# Patient Record
Sex: MALE | Race: WHITE
[De-identification: names, ages, dates, MRNs, and addresses within clinical notes are randomized per-mention and may not be internally consistent; named-entity substitution may affect disease eponyms.]

---

## 2017-01-04 ENCOUNTER — RECORD ABSTRACTING (OUTPATIENT)
Age: 82
End: 2017-01-04

## 2017-01-04 DIAGNOSIS — Z95.810 PRESENCE OF AUTOMATIC (IMPLANTABLE) CARDIAC DEFIBRILLATOR: ICD-10-CM

## 2017-01-04 PROBLEM — Z00.00 ENCOUNTER FOR PREVENTIVE HEALTH EXAMINATION: Status: ACTIVE | Noted: 2017-01-04

## 2017-09-13 ENCOUNTER — APPOINTMENT (OUTPATIENT)
Dept: ENDOCRINOLOGY | Facility: CLINIC | Age: 82
End: 2017-09-13

## 2017-09-13 ENCOUNTER — OUTPATIENT (OUTPATIENT)
Dept: OUTPATIENT SERVICES | Facility: HOSPITAL | Age: 82
LOS: 1 days | Discharge: HOME | End: 2017-09-13

## 2017-09-13 VITALS
HEIGHT: 71 IN | SYSTOLIC BLOOD PRESSURE: 129 MMHG | WEIGHT: 175 LBS | HEART RATE: 64 BPM | DIASTOLIC BLOOD PRESSURE: 81 MMHG | BODY MASS INDEX: 24.5 KG/M2 | TEMPERATURE: 97.1 F

## 2017-09-13 DIAGNOSIS — Z78.9 OTHER SPECIFIED HEALTH STATUS: ICD-10-CM

## 2018-06-18 ENCOUNTER — APPOINTMENT (OUTPATIENT)
Dept: UROLOGY | Facility: CLINIC | Age: 83
End: 2018-06-18
Payer: COMMERCIAL

## 2018-06-18 VITALS
HEIGHT: 71 IN | BODY MASS INDEX: 25.2 KG/M2 | WEIGHT: 180 LBS | HEART RATE: 60 BPM | DIASTOLIC BLOOD PRESSURE: 74 MMHG | SYSTOLIC BLOOD PRESSURE: 120 MMHG

## 2018-06-18 DIAGNOSIS — Z85.47 PERSONAL HISTORY OF MALIGNANT NEOPLASM OF TESTIS: ICD-10-CM

## 2018-06-18 PROCEDURE — 99204 OFFICE O/P NEW MOD 45 MIN: CPT

## 2018-06-18 NOTE — LETTER BODY
[Dear  ___] : Dear  [unfilled], [Consult Letter:] : I had the pleasure of evaluating your patient, [unfilled]. [Please see my note below.] : Please see my note below. [Consult Closing:] : Thank you very much for allowing me to participate in the care of this patient.  If you have any questions, please do not hesitate to contact me. [Sincerely,] : Sincerely, [FreeTextEntry3] : Matthew Garces MD\par Director of Male Sexual Dysfunction and Urologic Prosthetics

## 2018-06-18 NOTE — ASSESSMENT
[FreeTextEntry1] : This is a 82 year male who presents for evaluation\par left testicular cancer in 1988 with lymph node dissection\par Year ago had CT which showed no cancer\par \par Also had retractile R testicle which was brought down by Dr Acevedo but then it went back up.  no pain in the right inguinal region\par \par Has been taking testosterone androgel  2 pumps a day for one year.  without it feels weak, faint and sweating.  all of these symptoms resolve with testosterone\par \par July 2017:  sonogram\par IMPRESSION:\par Right testicle is in the right inguinal canal. No testicular mass.\par  \par April 2017 CT scan\par Impression:\par Multiple surgical clips in the retroperitoneal, probably status post lymph node dissection. No evidence of lymphadenopathy.\par No evidence of metastatic disease.\par Suspect mild circumferential thickening of the bladder wall. Rule out cystitis. Small stone in the bladder.\par Enlarged prostate.\par \par also has ED == takes viagra but rarely and still has about 6 pills left so does not want a refill yet. \par

## 2018-06-18 NOTE — PHYSICAL EXAM
[General Appearance - Well Developed] : well developed [General Appearance - Well Nourished] : well nourished [Normal Appearance] : normal appearance [Well Groomed] : well groomed [General Appearance - In No Acute Distress] : no acute distress [Edema] : no peripheral edema [Respiration, Rhythm And Depth] : normal respiratory rhythm and effort [Exaggerated Use Of Accessory Muscles For Inspiration] : no accessory muscle use [Abdomen Soft] : soft [Abdomen Tenderness] : non-tender [Costovertebral Angle Tenderness] : no ~M costovertebral angle tenderness [Urethral Meatus] : meatus normal [No Prostate Nodules] : no prostate nodules [Normal Station and Gait] : the gait and station were normal for the patient's age [] : no rash [No Focal Deficits] : no focal deficits [Oriented To Time, Place, And Person] : oriented to person, place, and time [Affect] : the affect was normal [Mood] : the mood was normal [Not Anxious] : not anxious [FreeTextEntry1] : no testes in scrotum

## 2018-09-17 ENCOUNTER — APPOINTMENT (OUTPATIENT)
Dept: UROLOGY | Facility: CLINIC | Age: 83
End: 2018-09-17
Payer: COMMERCIAL

## 2018-09-17 ENCOUNTER — APPOINTMENT (OUTPATIENT)
Dept: UROLOGY | Facility: CLINIC | Age: 83
End: 2018-09-17

## 2018-09-17 VITALS
SYSTOLIC BLOOD PRESSURE: 148 MMHG | WEIGHT: 180 LBS | HEIGHT: 71 IN | BODY MASS INDEX: 25.2 KG/M2 | HEART RATE: 68 BPM | DIASTOLIC BLOOD PRESSURE: 76 MMHG

## 2018-09-17 PROCEDURE — 99214 OFFICE O/P EST MOD 30 MIN: CPT

## 2018-09-17 NOTE — ASSESSMENT
[FreeTextEntry1] : This is a 82 year male who presents for evaluation\par left testicular cancer in 1988 with lymph node dissection\par Year ago had CT which showed no cancer\par \par Also had retractile R testicle which was brought down by Dr Acevedo but then it went back up. no pain in the right inguinal region\par \par Has been taking testosterone androgel 2 pumps a day for one year. without it feels weak, faint and sweating. all of these symptoms resolve with testosterone\par \par July 2017: sonogram\par IMPRESSION:\par Right testicle is in the right inguinal canal. No testicular mass.\par  \par April 2017 CT scan\par Impression:\par Multiple surgical clips in the retroperitoneal, probably status post lymph node dissection. No evidence of lymphadenopathy.\par No evidence of metastatic disease.\par Suspect mild circumferential thickening of the bladder wall. Rule out cystitis. Small stone in the bladder.\par Enlarged prostate.\par \par also has ED == takes viagra but rarely and still has about 6 pills left so does not want a refill yet. \par  \par US Doppler Scrotum: no mass in right inguinal testis\par \par Labs from 8/14/18\par normal H/H\par Estradio 68 (<39)\par PSA 8.7\par % free = 30\par total T = 683\par Bioavail 203

## 2019-01-14 ENCOUNTER — APPOINTMENT (OUTPATIENT)
Dept: UROLOGY | Facility: CLINIC | Age: 84
End: 2019-01-14
Payer: COMMERCIAL

## 2019-01-14 VITALS
DIASTOLIC BLOOD PRESSURE: 78 MMHG | WEIGHT: 170 LBS | BODY MASS INDEX: 24.34 KG/M2 | SYSTOLIC BLOOD PRESSURE: 133 MMHG | HEART RATE: 75 BPM | HEIGHT: 70 IN

## 2019-01-14 PROCEDURE — 99214 OFFICE O/P EST MOD 30 MIN: CPT

## 2019-01-14 NOTE — ASSESSMENT
[FreeTextEntry1] : JAMEE STATON is a 83 year old male with a past medical history of left testicular cancer in 1988 with lymph node dissection and a year ago had CT which showed no cancer.Also had retractile R testicle which was brought down by Dr Acevedo but then it went back up. no pain in the right inguinal region. Presents to the office today for a follow up, last seen on 9/17/2018 for low testosterone. PAtient currently on testosterone Androgel 2 pumps a day for over a year. Without it feels weak, faint and sweating. all of these symptoms resolve with testosterone. Patient states feeling good, no complaints. Patient started SIldenafil for ED and satisfied with results. Patient states that he used a total of 6 pills since last visit.\par \par 12/13/2018\par -Normal Hepatic Function panel\par -Total testosterone 281(250-1100), Testostoerone Free 40.3 (6.0-73),  Bioavailable 82.9 (),SHBG 28 (22-77),  Estradiol 52 H (<39), ALbumin 4.5 (3.6-5.1)\par -PSA 6.7 ng/ml, 25 % Free PSA (PSA 8.7 on 8/14/2018)\par \par July 2017: sonogram\par IMPRESSION:\par Right testicle is in the right inguinal canal. No testicular mass.\par

## 2019-01-14 NOTE — LETTER BODY
[Dear  ___] : Dear  [unfilled], [Consult Letter:] : I had the pleasure of evaluating your patient, [unfilled]. [Please see my note below.] : Please see my note below. [Consult Closing:] : Thank you very much for allowing me to participate in the care of this patient.  If you have any questions, please do not hesitate to contact me. [Sincerely,] : Sincerely, [FreeTextEntry2] : Dr. Yogi Cam [FreeTextEntry3] : Matthew Garces MD\par Director of Male Sexual Dysfunction and Urologic Prosthetics

## 2019-01-14 NOTE — HISTORY OF PRESENT ILLNESS
[Currently Experiencing ___] :  [unfilled] [Erectile Dysfunction] : Erectile Dysfunction [None] : None [FreeTextEntry1] : JAMEE STATON is a 83 year old male with a past medical history of left testicular cancer in 1988 with lymph node dissection and a year ago had CT which showed no cancer.Also had retractile R testicle which was brought down by Dr Acevedo but then it went back up. no pain in the right inguinal region. Presents to the office today for a follow up, last seen on 9/17/2018 for low testosterone. PAtient currently on testosterone Androgel 2 pumps a day for over a year. Without it feels weak, faint and sweating. all of these symptoms resolve with testosterone. Patient states feeling good, no complaints. Patient started SIldenafil for ED and satisfied with results. Patient states that he used a total of 6 pills since last visit.\par \par 12/13/2018\par -Normal Hepatic Function panel\par -Total testosterone 281(250-1100), Testostoerone Free 40.3 (6.0-73),  Bioavailable 82.9 (),SHBG 28 (22-77),  Estradiol 52 H (<39), ALbumin 4.5 (3.6-5.1)\par -PSA 6.7 ng/ml, 25 % Free PSA (PSA 8.7 on 8/14/2018)\par \par July 2017: sonogram\par IMPRESSION:\par Right testicle is in the right inguinal canal. No testicular mass.\par  \par \par \par

## 2019-02-25 RX ORDER — TESTOSTERONE 20.25 MG/1.25G
20.25 MG/1.25GM GEL TOPICAL
Qty: 2 | Refills: 0 | Status: DISCONTINUED | COMMUNITY
Start: 2018-06-18 | End: 2019-02-25

## 2019-04-29 ENCOUNTER — INPATIENT (INPATIENT)
Facility: HOSPITAL | Age: 84
LOS: 2 days | Discharge: HOME | End: 2019-05-02
Attending: HOSPITALIST | Admitting: HOSPITALIST
Payer: MEDICARE

## 2019-04-29 VITALS
OXYGEN SATURATION: 99 % | DIASTOLIC BLOOD PRESSURE: 77 MMHG | SYSTOLIC BLOOD PRESSURE: 164 MMHG | TEMPERATURE: 97 F | HEART RATE: 70 BPM | RESPIRATION RATE: 20 BRPM

## 2019-04-29 DIAGNOSIS — Z98.890 OTHER SPECIFIED POSTPROCEDURAL STATES: Chronic | ICD-10-CM

## 2019-04-29 LAB
ALBUMIN SERPL ELPH-MCNC: 4.6 G/DL — SIGNIFICANT CHANGE UP (ref 3.5–5.2)
ALP SERPL-CCNC: 75 U/L — SIGNIFICANT CHANGE UP (ref 30–115)
ALT FLD-CCNC: 18 U/L — SIGNIFICANT CHANGE UP (ref 0–41)
ANION GAP SERPL CALC-SCNC: 11 MMOL/L — SIGNIFICANT CHANGE UP (ref 7–14)
APPEARANCE UR: CLEAR — SIGNIFICANT CHANGE UP
APTT BLD: 33.4 SEC — SIGNIFICANT CHANGE UP (ref 27–39.2)
AST SERPL-CCNC: 24 U/L — SIGNIFICANT CHANGE UP (ref 0–41)
BACTERIA # UR AUTO: ABNORMAL /HPF
BASOPHILS # BLD AUTO: 0.04 K/UL — SIGNIFICANT CHANGE UP (ref 0–0.2)
BASOPHILS NFR BLD AUTO: 0.5 % — SIGNIFICANT CHANGE UP (ref 0–1)
BILIRUB SERPL-MCNC: 0.6 MG/DL — SIGNIFICANT CHANGE UP (ref 0.2–1.2)
BILIRUB UR-MCNC: NEGATIVE — SIGNIFICANT CHANGE UP
BLD GP AB SCN SERPL QL: SIGNIFICANT CHANGE UP
BUN SERPL-MCNC: 28 MG/DL — HIGH (ref 10–20)
CALCIUM SERPL-MCNC: 9.6 MG/DL — SIGNIFICANT CHANGE UP (ref 8.5–10.1)
CHLORIDE SERPL-SCNC: 105 MMOL/L — SIGNIFICANT CHANGE UP (ref 98–110)
CO2 SERPL-SCNC: 27 MMOL/L — SIGNIFICANT CHANGE UP (ref 17–32)
COLOR SPEC: YELLOW — SIGNIFICANT CHANGE UP
CREAT SERPL-MCNC: 1.8 MG/DL — HIGH (ref 0.7–1.5)
DIFF PNL FLD: NEGATIVE — SIGNIFICANT CHANGE UP
EOSINOPHIL # BLD AUTO: 0.14 K/UL — SIGNIFICANT CHANGE UP (ref 0–0.7)
EOSINOPHIL NFR BLD AUTO: 1.7 % — SIGNIFICANT CHANGE UP (ref 0–8)
EPI CELLS # UR: ABNORMAL /HPF
GLUCOSE SERPL-MCNC: 93 MG/DL — SIGNIFICANT CHANGE UP (ref 70–99)
GLUCOSE UR QL: NEGATIVE MG/DL — SIGNIFICANT CHANGE UP
HCT VFR BLD CALC: 47.4 % — SIGNIFICANT CHANGE UP (ref 42–52)
HGB BLD-MCNC: 15.2 G/DL — SIGNIFICANT CHANGE UP (ref 14–18)
IMM GRANULOCYTES NFR BLD AUTO: 0.2 % — SIGNIFICANT CHANGE UP (ref 0.1–0.3)
INR BLD: 1.01 RATIO — SIGNIFICANT CHANGE UP (ref 0.65–1.3)
KETONES UR-MCNC: NEGATIVE — SIGNIFICANT CHANGE UP
LEUKOCYTE ESTERASE UR-ACNC: NEGATIVE — SIGNIFICANT CHANGE UP
LYMPHOCYTES # BLD AUTO: 1.29 K/UL — SIGNIFICANT CHANGE UP (ref 1.2–3.4)
LYMPHOCYTES # BLD AUTO: 15.8 % — LOW (ref 20.5–51.1)
MCHC RBC-ENTMCNC: 31.9 PG — HIGH (ref 27–31)
MCHC RBC-ENTMCNC: 32.1 G/DL — SIGNIFICANT CHANGE UP (ref 32–37)
MCV RBC AUTO: 99.6 FL — HIGH (ref 80–94)
MONOCYTES # BLD AUTO: 0.68 K/UL — HIGH (ref 0.1–0.6)
MONOCYTES NFR BLD AUTO: 8.3 % — SIGNIFICANT CHANGE UP (ref 1.7–9.3)
NEUTROPHILS # BLD AUTO: 6.01 K/UL — SIGNIFICANT CHANGE UP (ref 1.4–6.5)
NEUTROPHILS NFR BLD AUTO: 73.5 % — SIGNIFICANT CHANGE UP (ref 42.2–75.2)
NITRITE UR-MCNC: NEGATIVE — SIGNIFICANT CHANGE UP
NRBC # BLD: 0 /100 WBCS — SIGNIFICANT CHANGE UP (ref 0–0)
PH UR: 6.5 — SIGNIFICANT CHANGE UP (ref 5–8)
PLATELET # BLD AUTO: 140 K/UL — SIGNIFICANT CHANGE UP (ref 130–400)
POTASSIUM SERPL-MCNC: 4.9 MMOL/L — SIGNIFICANT CHANGE UP (ref 3.5–5)
POTASSIUM SERPL-SCNC: 4.9 MMOL/L — SIGNIFICANT CHANGE UP (ref 3.5–5)
PROT SERPL-MCNC: 7.2 G/DL — SIGNIFICANT CHANGE UP (ref 6–8)
PROT UR-MCNC: ABNORMAL MG/DL
PROTHROM AB SERPL-ACNC: 11.6 SEC — SIGNIFICANT CHANGE UP (ref 9.95–12.87)
RBC # BLD: 4.76 M/UL — SIGNIFICANT CHANGE UP (ref 4.7–6.1)
RBC # FLD: 12.6 % — SIGNIFICANT CHANGE UP (ref 11.5–14.5)
SODIUM SERPL-SCNC: 143 MMOL/L — SIGNIFICANT CHANGE UP (ref 135–146)
SP GR SPEC: 1.02 — SIGNIFICANT CHANGE UP (ref 1.01–1.03)
TROPONIN T SERPL-MCNC: <0.01 NG/ML — SIGNIFICANT CHANGE UP
TYPE + AB SCN PNL BLD: SIGNIFICANT CHANGE UP
UROBILINOGEN FLD QL: 1 MG/DL (ref 0.2–0.2)
WBC # BLD: 8.18 K/UL — SIGNIFICANT CHANGE UP (ref 4.8–10.8)
WBC # FLD AUTO: 8.18 K/UL — SIGNIFICANT CHANGE UP (ref 4.8–10.8)

## 2019-04-29 PROCEDURE — 99285 EMERGENCY DEPT VISIT HI MDM: CPT | Mod: GC

## 2019-04-29 PROCEDURE — 93289 INTERROG DEVICE EVAL HEART: CPT | Mod: 26

## 2019-04-29 PROCEDURE — 71045 X-RAY EXAM CHEST 1 VIEW: CPT | Mod: 26

## 2019-04-29 RX ORDER — SPIRONOLACTONE 25 MG/1
25 TABLET, FILM COATED ORAL
Qty: 0 | Refills: 0 | Status: DISCONTINUED | OUTPATIENT
Start: 2019-04-29 | End: 2019-05-02

## 2019-04-29 RX ORDER — LOSARTAN POTASSIUM 100 MG/1
100 TABLET, FILM COATED ORAL DAILY
Qty: 0 | Refills: 0 | Status: DISCONTINUED | OUTPATIENT
Start: 2019-04-29 | End: 2019-05-02

## 2019-04-29 RX ORDER — METOPROLOL TARTRATE 50 MG
50 TABLET ORAL
Qty: 0 | Refills: 0 | Status: DISCONTINUED | OUTPATIENT
Start: 2019-04-29 | End: 2019-05-02

## 2019-04-29 RX ORDER — HEPARIN SODIUM 5000 [USP'U]/ML
5000 INJECTION INTRAVENOUS; SUBCUTANEOUS EVERY 8 HOURS
Qty: 0 | Refills: 0 | Status: DISCONTINUED | OUTPATIENT
Start: 2019-04-29 | End: 2019-05-01

## 2019-04-29 RX ORDER — SIMVASTATIN 20 MG/1
20 TABLET, FILM COATED ORAL AT BEDTIME
Qty: 0 | Refills: 0 | Status: DISCONTINUED | OUTPATIENT
Start: 2019-04-29 | End: 2019-05-02

## 2019-04-29 RX ADMIN — Medication 50 MILLIGRAM(S): at 23:21

## 2019-04-29 RX ADMIN — HEPARIN SODIUM 5000 UNIT(S): 5000 INJECTION INTRAVENOUS; SUBCUTANEOUS at 22:30

## 2019-04-29 NOTE — ED ADULT TRIAGE NOTE - CHIEF COMPLAINT QUOTE
Pt state he is filling noise coming from left side AICD This is been going for long time . send by PMD for further evaluation ./

## 2019-04-29 NOTE — ED PROVIDER NOTE - PROGRESS NOTE DETAILS
discussed with EP will come to interrogate patient's AICD Dr. Taylor bedside who states that patient's aicd battery is dead. States that he will go for a replacement tomorrow. Patient can be admitted to telemetry.

## 2019-04-29 NOTE — ED PROVIDER NOTE - NS ED ROS FT
Eyes:  No visual changes, eye pain or discharge.  ENMT:  No hearing changes, pain, no sore throat or runny nose, no difficulty swallowing  Cardiac:  No chest pain, no sob, no palpitations, + beeping of his AICD x 1 year.   Respiratory:  No cough or respiratory distress.    GI:  No nausea, vomiting, diarrhea or abdominal pain.  :  No dysuria, frequency or burning.  MS:  No joint pain or back pain.  Neuro:  No headache   Skin:  No skin rash.   Endocrine: No history of thyroid disease or diabetes.

## 2019-04-29 NOTE — ED PROVIDER NOTE - PHYSICAL EXAMINATION
CONSTITUTIONAL: Well-developed; well-nourished; in no acute distress.   SKIN: warm, dry  HEAD: Normocephalic; atraumatic.  EYES: PERRL, no conjunctival erythema  ENT: No nasal discharge; airway clear.  NECK: Supple; non tender.  CARD: S1, S2 normal;  Regular rate and rhythm. AICD in left chest  RESP: No wheezes, rales or rhonchi.  ABD: soft non tender, non distended, no rebound or guarding  EXT: Normal ROM.  no pedal edema.   LYMPH: No acute cervical adenopathy.  NEURO: Alert, oriented, grossly unremarkable.

## 2019-04-29 NOTE — H&P ADULT - NSHPPHYSICALEXAM_GEN_ALL_CORE
PHYSICAL EXAM:  GEN: No acute distress  LUNGS: Clear to auscultation bilaterally; no wheeze  HEART: S1/S2 present. RRR.  ABD: Soft, non-tender, non-distended. Bowel sounds present  MSK: NC/NC/NE/2+PP/LIN  NEURO: AAOX3, non-focal

## 2019-04-29 NOTE — H&P ADULT - HISTORY OF PRESENT ILLNESS
84 yo M pmh of HTN, HLD, CHF with AICD presents with beeping of his AICD x 1 year. States that every morning at 9am he was noticing beeping lasting for 2-3 minutes. Patient thought that it was the radio at his home that was beeping every day. A few weeks ago he unplugged his radio and still noted the beeping was occurring. Patient called his EP Dr. Almodovar advised to present to ED. Patient denies any active complaints. He has no fevers, chills, chest pain, palpitation, n/v/d, no abdominal pain. In ED, AICD interrogated found to be at end of service. EP fellow evaluation with plan for battery change on Thursday.

## 2019-04-29 NOTE — CONSULT NOTE ADULT - ATTENDING COMMENTS
BiV-ICD with battery at end-of service  Recommend generator change    We discussed the risks/benefits/alternatives, nature of procedure, and follow up care after device is implanted. We also discussed remote monitoring in details. Patient is in agreement with proceeding with the device implant. We discussed the risks of bleeding, hematoma, injury to vessels and heart, perforation, tamponade, pneumothorax, infection, lead dislodgment, device malfunction, and rare risks of stroke/heart attack/death. Patient expressed understanding of the discussion. I answered all the questions in details.

## 2019-04-29 NOTE — CONSULT NOTE ADULT - ASSESSMENT
BiV-ICD battery End of Service  - Admit to telemetry  - battery will need to be changed on this admission  - planning for OR on Thursday  - 2d echo  - labs including Coags  - MRSA swab, UA  - will follow  - check EKG    Non-ischemic cardiomyopathy  - Bi-V ICD with BiV pacing, not pacing dependant  - unknown medications, please clarify with patient's pharmacy  - patient should be on heart failure regimen with BB, ACE/ARB

## 2019-04-29 NOTE — H&P ADULT - ATTENDING COMMENTS
Patient seen and examined independently. Agree with resident note/ history / physical exam and plan of care with following exceptions/additions/updates. Case discussed with house-staff, nursing and patient/pt decision maker.     pt needs aicd battery changed. awaiting.    #Progress Note Handoff  Pending (specify):  Consults____EP follow up____  Family discussion: ora pt and his wife and his daughter at the bedside. they agree  Disposition: Home

## 2019-04-29 NOTE — H&P ADULT - NSHPLABSRESULTS_GEN_ALL_CORE
Vitals:    Vital Signs Last 24 Hrs  T(C): 36.2 (2019 14:30), Max: 36.2 (2019 14:30)  T(F): 97.2 (2019 14:30), Max: 97.2 (2019 14:30)  HR: 70 (2019 14:30) (70 - 70)  BP: 164/77 (2019 14:30) (164/77 - 164/77)  BP(mean): --  RR: 20 (2019 14:30) (20 - 20)  SpO2: 99% (2019 14:30) (99% - 99%)    Labs:         143  |  105  |  28<H>  ----------------------------<  93  4.9   |  27  |  1.8<H>    Ca    9.6      2019 16:35    TPro  7.2  /  Alb  4.6  /  TBili  0.6  /  DBili  x   /  AST  24  /  ALT  18  /  AlkPhos  75                            15.2   8.18  )-----------( 140      ( 2019 16:35 )             47.4     CARDIAC MARKERS ( 2019 16:35 )  x     / <0.01 ng/mL / x     / x     / x          LIVER FUNCTIONS - ( 2019 16:35 )  Alb: 4.6 g/dL / Pro: 7.2 g/dL / ALK PHOS: 75 U/L / ALT: 18 U/L / AST: 24 U/L / GGT: x           PT/INR - ( 2019 16:35 )   PT: 11.60 sec;   INR: 1.01 ratio         PTT - ( 2019 16:35 )  PTT:33.4 sec  Urinalysis Basic - ( 2019 19:00 )    Color: Yellow / Appearance: Clear / S.020 / pH: x  Gluc: x / Ketone: Negative  / Bili: Negative / Urobili: 1.0 mg/dL   Blood: x / Protein: Trace mg/dL / Nitrite: Negative   Leuk Esterase: Negative / RBC: x / WBC x   Sq Epi: x / Non Sq Epi: Occasional /HPF / Bacteria: Few /HPF    RADIOLOGY

## 2019-04-29 NOTE — H&P ADULT - NSICDXPASTMEDICALHX_GEN_ALL_CORE_FT
PAST MEDICAL HISTORY:  Chronic CHF (congestive heart failure)     CKD (chronic kidney disease) stage 3, GFR 30-59 ml/min baseline 1.8    Dyslipidemia

## 2019-04-29 NOTE — ED PROVIDER NOTE - ATTENDING CONTRIBUTION TO CARE
84 yo male PMH as noted here for evaluation of beeping AICD.  No complaints otherwise, well-appearing male, exam as noted.  Battery needs to be replaced , will admit to tele as d/c cardiology.

## 2019-04-29 NOTE — ED PROVIDER NOTE - OBJECTIVE STATEMENT
84 yo M pmh of HTN, HLD, CHF with AICD presents with beeping of his AICD x 1 year. States that every morning at 9am he was noticing beeping lasting for 2-3 minutes. Patient thought that it was the radio at his home that was beeping every day. A few weeks ago he unplugged his radio and still noted the beeping was occurring. Went to his pmd today Dr. Vera who told him to come to the ED for evaluation of the AICD. His EP is Dr. Almodovar. no chest pain, no shocks, no palpitations, no fevers, no n/v/d, no abdominal pain.

## 2019-04-29 NOTE — H&P ADULT - ASSESSMENT
82 yo M pmh of HTN, HLD, CHF with AICD presents with beeping of his AICD x 1 year.      #Chronic systolic CHF s/p AICD   #AICD at end of service   - Interrogation of device with AICD at end of service  - Admit to tele  - EP following - plan for battery change on Thursday   - Check 2D echo, f/u MRSA swab, U/A negative  - Continue with ARB, BB, and aldactone  - Keep daily weights, prn lasix if volume overloaded     #HTN/DLD  - c/w BB and ARB  - c/w statin     #CKD 3  - Cr 1.8,  at baseline    DVT PPx: Heparin SC   DASH diet

## 2019-04-29 NOTE — CONSULT NOTE ADULT - SUBJECTIVE AND OBJECTIVE BOX
Date of Admission: 4/29    CHIEF COMPLAINT: ICD beeping x one year    HISTORY OF PRESENT ILLNESS: 83yMale with PMH below presented to the hospital for above CC. He initiallly staerted noticing his ICD beeping since one year ago. Every day at the same time just after 900AM. He initially (for one year) thought it was his alarm clock going off and did not know how to turn it off. He later unplugged his alarm clock and the noise would still go off. He then called Dr. Almodovar's office and they told him to com eot the ER. Interrogation of the device realed a battery at end of service. Patient denies SOB, ROUSE, chest pain or palpitations. He has been "feeling healthy" for the last two years so he didnt get his device checked out.     PAST MEDICAL & SURGICAL HISTORY:    HEALTH ISSUES - PROBLEM Dx:        FAMILY HISTORY:    None [x ]  Mother:   Father:   Siblings:     SOCIAL HISTORY:    [x ] Non-smoker  [ ] Smoker  [ ] Alcohol    Allergies    Allergy Status Unknown    Intolerances    	    REVIEW OF SYSTEMS:  CONSTITUTIONAL: No fever, weight loss, or fatigue  CARDIOLOGY: PAtient denies chest pain, shortness of breath or syncopal episodes.   RESPIRATORY: denies shortness of breath, wheezeing.   NEUROLOGICAL: NO weakness, no focal deficits to report.  ENDOCRINOLOGICAL: no recent change in diabetic medications.   GI: no BRBPR, no N,V,diarrhea.    PSYCHIATRY: normal mood and affect  HEENT: no nasal discharge, no ecchymosis  SKIN: no ecchymosis, no breakdown  MUSCULOSKELETAL: Full range of motion x4.      PHYSICAL EXAM:  T(C): 36.2 (04-29-19 @ 14:30), Max: 36.2 (04-29-19 @ 14:30)  HR: 70 (04-29-19 @ 14:30) (70 - 70)  BP: 164/77 (04-29-19 @ 14:30) (164/77 - 164/77)  RR: 20 (04-29-19 @ 14:30) (20 - 20)  SpO2: 99% (04-29-19 @ 14:30) (99% - 99%)  Wt(kg): --  I&O's Summary    Daily     Daily     General Appearance: Normal for age and gender	  Cardiovascular: regular rate and rhythm S1 S2, No JVD, No murmurs, No edema  Respiratory: Lungs clear to auscultation	  Psychiatry: A & O x 3, Mood & affect appropriate  Gastrointestinal:  Soft, Non-tender  Skin: No rashes, No ecchymoses, No cyanosis	  Neurologic: Non-focal  Musculoskeletal/ extremities: Normal range of motion, No clubbing, cyanosis or edema  Vascular: Peripheral pulses palpable 2+ bilaterally    LABS:	 	      pending              CARDIAC MARKERS:            TELEMETRY EVENTS: 	    ECG: no performed  RADIOLOGY:  OTHER: 	    PREVIOUS DIAGNOSTIC TESTING:    [ x] Echocardiogram: Summary   Left ventricle: The ventricle was dilated. Ejection fraction was estimated to   be 15 %. There was severe diffuse hypokinesis. Doppler parameters were   consistent with restrictive physiology, indicative of decreased left   ventricular diastolic compliance and/or increased left atrial pressure.   Aortic valve: Regurgitation grade was 1+ on a scale of 0 to 4+.   Mitral valve: Regurgitation grade was 2+ on a scale of 0 to 4+.   Pericardium: A small pericardial effusion was identified. There was a right   pleural effusion. There was a left pleural effusion    [x ]  Catheterization: 2011: no obstructive CAD. 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 He initiallly staerted noticing his ICD beeping since one year ago. Every day at the same time just after 900AM. He initially (for one year) thought it was his alarm clock going off and did not know how to turn it off. He later unplugged his alarm clock and the noise would still go off. He then called Dr. Almodovar's office and they told him to com eot the ER. Interrogation of the device realed a battery at end of service. Patient denies SOB, ROUSE, chest pain or palpitations. He has been "feeling healthy" for the last two years so he didnt get his device checked out.     PAST MEDICAL & SURGICAL HISTORY:  CARDIOMYOPATHY  SYSTOLIC HEART FAILURE  S/P BIVICD IMPLANT    HEALTH ISSUES - PROBLEM Dx:        FAMILY HISTORY:    None [x ]  Mother:   Father:   Siblings:     SOCIAL HISTORY:    [x ] Non-smoker  [ ] Smoker  [ ] Alcohol    Allergies    Allergy Status Unknown    Intolerances    	    REVIEW OF SYSTEMS:  CONSTITUTIONAL: No fever, weight loss, or fatigue  CARDIOLOGY: PAtient denies chest pain, shortness of breath or syncopal episodes.   RESPIRATORY: denies shortness of breath, wheezeing.   NEUROLOGICAL: NO weakness, no focal deficits to report.  ENDOCRINOLOGICAL: no recent change in diabetic medications.   GI: no BRBPR, no N,V,diarrhea.    PSYCHIATRY: normal mood and affect  HEENT: no nasal discharge, no ecchymosis  SKIN: no ecchymosis, no breakdown  MUSCULOSKELETAL: Full range of motion x4.      PHYSICAL EXAM:  T(C): 36.2 (04-29-19 @ 14:30), Max: 36.2 (04-29-19 @ 14:30)  HR: 70 (04-29-19 @ 14:30) (70 - 70)  BP: 164/77 (04-29-19 @ 14:30) (164/77 - 164/77)  RR: 20 (04-29-19 @ 14:30) (20 - 20)  SpO2: 99% (04-29-19 @ 14:30) (99% - 99%)  Wt(kg): --  I&O's Summary    Daily     Daily     General Appearance: Normal for age and gender	  Cardiovascular: regular rate and rhythm S1 S2, No JVD, No murmurs, No edema  Respiratory: Lungs clear to auscultation	  Psychiatry: A & O x 3, Mood & affect appropriate  Gastrointestinal:  Soft, Non-tender  Skin: No rashes, No ecchymoses, No cyanosis	  Neurologic: Non-focal  Musculoskeletal/ extremities: Normal range of motion, No clubbing, cyanosis or edema  Vascular: Peripheral pulses palpable 2+ bilaterally    LABS:	 	      pending      TELEMETRY EVENTS: 	    ECG: PACED RHYTHM  RADIOLOGY:  OTHER: 	    PREVIOUS DIAGNOSTIC TESTING:    [ x] Echocardiogram: Summary   Left ventricle: The ventricle was dilated. Ejection fraction was estimated to   be 15 %. There was severe diffuse hypokinesis. Doppler parameters were   consistent with restrictive physiology, indicative of decreased left   ventricular diastolic compliance and/or increased left atrial pressure.   Aortic valve: Regurgitation grade was 1+ on a scale of 0 to 4+.   Mitral valve: Regurgitation grade was 2+ on a scale of 0 to 4+.   Pericardium: A small pericardial effusion was identified. There was a right   pleural effusion. There was a left pleural effusion    [x ]  Catheterization: 2011: no obstructive CAD. 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

## 2019-04-30 LAB
ANION GAP SERPL CALC-SCNC: 12 MMOL/L — SIGNIFICANT CHANGE UP (ref 7–14)
APTT BLD: 33.3 SEC — SIGNIFICANT CHANGE UP (ref 27–39.2)
BASOPHILS # BLD AUTO: 0.03 K/UL — SIGNIFICANT CHANGE UP (ref 0–0.2)
BASOPHILS NFR BLD AUTO: 0.4 % — SIGNIFICANT CHANGE UP (ref 0–1)
BUN SERPL-MCNC: 27 MG/DL — HIGH (ref 10–20)
CALCIUM SERPL-MCNC: 9.5 MG/DL — SIGNIFICANT CHANGE UP (ref 8.5–10.1)
CHLORIDE SERPL-SCNC: 106 MMOL/L — SIGNIFICANT CHANGE UP (ref 98–110)
CO2 SERPL-SCNC: 28 MMOL/L — SIGNIFICANT CHANGE UP (ref 17–32)
CREAT SERPL-MCNC: 1.7 MG/DL — HIGH (ref 0.7–1.5)
EOSINOPHIL # BLD AUTO: 0.16 K/UL — SIGNIFICANT CHANGE UP (ref 0–0.7)
EOSINOPHIL NFR BLD AUTO: 2.3 % — SIGNIFICANT CHANGE UP (ref 0–8)
GLUCOSE SERPL-MCNC: 98 MG/DL — SIGNIFICANT CHANGE UP (ref 70–99)
HCT VFR BLD CALC: 48.3 % — SIGNIFICANT CHANGE UP (ref 42–52)
HGB BLD-MCNC: 15.4 G/DL — SIGNIFICANT CHANGE UP (ref 14–18)
IMM GRANULOCYTES NFR BLD AUTO: 0.1 % — SIGNIFICANT CHANGE UP (ref 0.1–0.3)
INR BLD: 1.01 RATIO — SIGNIFICANT CHANGE UP (ref 0.65–1.3)
LYMPHOCYTES # BLD AUTO: 1.55 K/UL — SIGNIFICANT CHANGE UP (ref 1.2–3.4)
LYMPHOCYTES # BLD AUTO: 22.2 % — SIGNIFICANT CHANGE UP (ref 20.5–51.1)
MAGNESIUM SERPL-MCNC: 2 MG/DL — SIGNIFICANT CHANGE UP (ref 1.8–2.4)
MCHC RBC-ENTMCNC: 31.9 G/DL — LOW (ref 32–37)
MCHC RBC-ENTMCNC: 31.9 PG — HIGH (ref 27–31)
MCV RBC AUTO: 100 FL — HIGH (ref 80–94)
MONOCYTES # BLD AUTO: 0.74 K/UL — HIGH (ref 0.1–0.6)
MONOCYTES NFR BLD AUTO: 10.6 % — HIGH (ref 1.7–9.3)
MRSA PCR RESULT.: ABNORMAL
NEUTROPHILS # BLD AUTO: 4.48 K/UL — SIGNIFICANT CHANGE UP (ref 1.4–6.5)
NEUTROPHILS NFR BLD AUTO: 64.4 % — SIGNIFICANT CHANGE UP (ref 42.2–75.2)
NRBC # BLD: 0 /100 WBCS — SIGNIFICANT CHANGE UP (ref 0–0)
PLATELET # BLD AUTO: 144 K/UL — SIGNIFICANT CHANGE UP (ref 130–400)
POTASSIUM SERPL-MCNC: 4.6 MMOL/L — SIGNIFICANT CHANGE UP (ref 3.5–5)
POTASSIUM SERPL-SCNC: 4.6 MMOL/L — SIGNIFICANT CHANGE UP (ref 3.5–5)
PROTHROM AB SERPL-ACNC: 11.6 SEC — SIGNIFICANT CHANGE UP (ref 9.95–12.87)
RBC # BLD: 4.83 M/UL — SIGNIFICANT CHANGE UP (ref 4.7–6.1)
RBC # FLD: 12.6 % — SIGNIFICANT CHANGE UP (ref 11.5–14.5)
SODIUM SERPL-SCNC: 146 MMOL/L — SIGNIFICANT CHANGE UP (ref 135–146)
WBC # BLD: 6.97 K/UL — SIGNIFICANT CHANGE UP (ref 4.8–10.8)
WBC # FLD AUTO: 6.97 K/UL — SIGNIFICANT CHANGE UP (ref 4.8–10.8)

## 2019-04-30 PROCEDURE — 99223 1ST HOSP IP/OBS HIGH 75: CPT

## 2019-04-30 RX ADMIN — Medication 50 MILLIGRAM(S): at 06:09

## 2019-04-30 RX ADMIN — LOSARTAN POTASSIUM 100 MILLIGRAM(S): 100 TABLET, FILM COATED ORAL at 06:10

## 2019-04-30 RX ADMIN — SIMVASTATIN 20 MILLIGRAM(S): 20 TABLET, FILM COATED ORAL at 06:12

## 2019-04-30 RX ADMIN — HEPARIN SODIUM 5000 UNIT(S): 5000 INJECTION INTRAVENOUS; SUBCUTANEOUS at 06:11

## 2019-04-30 RX ADMIN — HEPARIN SODIUM 5000 UNIT(S): 5000 INJECTION INTRAVENOUS; SUBCUTANEOUS at 14:00

## 2019-04-30 RX ADMIN — Medication 50 MILLIGRAM(S): at 17:45

## 2019-04-30 NOTE — PROGRESS NOTE ADULT - ASSESSMENT
82 yo M pmh of HTN, HLD, HFrEF with AICD presents for battery replacement of AICD    Chronic HFrEF with AICD/PPM at end of service: currently paced, UA-   - Battery change Thursday  - F/u interrogation  - Continue telemetry monitoring  - F/u 2D echo  - f/u MRSA swab  - Continue with ARB, BB, and aldactone  - Keep daily weights, prn lasix if volume overloaded     HTN/DLD: BP well controlled  - c/w BB and ARB  - c/w statin     CKD 3: at baseline    DVT PPx: Heparin SC   DASH diet

## 2019-05-01 LAB
ANION GAP SERPL CALC-SCNC: 12 MMOL/L — SIGNIFICANT CHANGE UP (ref 7–14)
BUN SERPL-MCNC: 34 MG/DL — HIGH (ref 10–20)
CALCIUM SERPL-MCNC: 10 MG/DL — SIGNIFICANT CHANGE UP (ref 8.5–10.1)
CHLORIDE SERPL-SCNC: 106 MMOL/L — SIGNIFICANT CHANGE UP (ref 98–110)
CO2 SERPL-SCNC: 28 MMOL/L — SIGNIFICANT CHANGE UP (ref 17–32)
CREAT SERPL-MCNC: 1.8 MG/DL — HIGH (ref 0.7–1.5)
GLUCOSE SERPL-MCNC: 90 MG/DL — SIGNIFICANT CHANGE UP (ref 70–99)
POTASSIUM SERPL-MCNC: 5 MMOL/L — SIGNIFICANT CHANGE UP (ref 3.5–5)
POTASSIUM SERPL-SCNC: 5 MMOL/L — SIGNIFICANT CHANGE UP (ref 3.5–5)
SODIUM SERPL-SCNC: 146 MMOL/L — SIGNIFICANT CHANGE UP (ref 135–146)

## 2019-05-01 PROCEDURE — 93306 TTE W/DOPPLER COMPLETE: CPT | Mod: 26

## 2019-05-01 RX ADMIN — HEPARIN SODIUM 5000 UNIT(S): 5000 INJECTION INTRAVENOUS; SUBCUTANEOUS at 06:11

## 2019-05-01 RX ADMIN — Medication 50 MILLIGRAM(S): at 06:13

## 2019-05-01 RX ADMIN — SPIRONOLACTONE 25 MILLIGRAM(S): 25 TABLET, FILM COATED ORAL at 06:12

## 2019-05-01 RX ADMIN — HEPARIN SODIUM 5000 UNIT(S): 5000 INJECTION INTRAVENOUS; SUBCUTANEOUS at 14:50

## 2019-05-01 RX ADMIN — SIMVASTATIN 20 MILLIGRAM(S): 20 TABLET, FILM COATED ORAL at 21:25

## 2019-05-01 RX ADMIN — Medication 50 MILLIGRAM(S): at 17:07

## 2019-05-01 RX ADMIN — LOSARTAN POTASSIUM 100 MILLIGRAM(S): 100 TABLET, FILM COATED ORAL at 06:11

## 2019-05-01 NOTE — PROGRESS NOTE ADULT - ASSESSMENT
82 yo M pmh of HTN, HLD, HFrEF with AICD presents for battery replacement of AICD    Chronic HFrEF with AICD/PPM at end of service: currently paced  - Battery change Thursday  - F/u interrogation  - F/u 2D echo,  MRSA swab: neg  - Continue with ARB, BB, and aldactone    Essential HTN: BP well controlled  -c/w BB and ARB    CKD III: at baseline    DLD: c/w statin     DVT PPx: Heparin SC   GI PPX: Not indicated     Full Code     Dispo: from home  -D/C after Battery placement Pt has no needs

## 2019-05-01 NOTE — PROGRESS NOTE ADULT - SUBJECTIVE AND OBJECTIVE BOX
Patient is a 83y old  Male who presents with a chief complaint of beeping from his  AICD, device was found to be out of service    Admitted to [facility] for monitoring, Battery to be replaced Thursday     Interval events: none    Today is hosp day 2  Patient is resting comfortably in bed  No complaints  Tolerating diet with regular BM  Ambulates independently  Plan : AICD battery to be replaced Thursday  Likely D/C  after Interrogation    PAST MEDICAL & SURGICAL HISTORY:  CKD (chronic kidney disease) stage 3, GFR 30-59 ml/min: baseline 1.8  Dyslipidemia  Chronic CHF (congestive heart failure)  H/O excision of testicular mass: left      MEDICATIONS  (STANDING):  heparin  Injectable 5000 Unit(s) SubCutaneous every 8 hours  losartan 100 milliGRAM(s) Oral daily  metoprolol tartrate 50 milliGRAM(s) Oral two times a day  simvastatin 20 milliGRAM(s) Oral at bedtime  spironolactone 25 milliGRAM(s) Oral <User Schedule>    MEDICATIONS  (PRN):          Vital Signs Last 24 Hrs  T(C): 35.7 (01 May 2019 06:09), Max: 36.3 (2019 20:41)  T(F): 96.3 (01 May 2019 06:09), Max: 97.3 (2019 20:41)  HR: 65 (01 May 2019 06:09) (62 - 70)  BP: 125/62 (01 May 2019 06:09) (125/62 - 152/69)  BP(mean): --  RR: 18 (01 May 2019 06:09) (18 - 18)  SpO2: 97% (2019 20:41) (97% - 98%)  CAPILLARY BLOOD GLUCOSE        I&O's Summary    01 May 2019 07:01  -  01 May 2019 10:54  --------------------------------------------------------  IN: 450 mL / OUT: 0 mL / NET: 450 mL        Physical Exam:    -   GEN: No acute distress  LUNGS: Clear to auscultation bilaterally   HEART: S1/S2 present. RRR.   ABD: Soft, non-tender, non-distended.   Extrmities: No edema  NEURO: AAOX3    Labs:                        15.4   6.97  )-----------( 144      ( 2019 06:24 )             48.3             05-01    146  |  106  |  34<H>  ----------------------------<  90  5.0   |  28  |  1.8<H>    Ca    10.0      01 May 2019 06:03  Mg     2.0     -    TPro  7.2  /  Alb  4.6  /  TBili  0.6  /  DBili  x   /  AST  24  /  ALT  18  /  AlkPhos  75  04-29    LIVER FUNCTIONS - ( 2019 16:35 )  Alb: 4.6 g/dL / Pro: 7.2 g/dL / ALK PHOS: 75 U/L / ALT: 18 U/L / AST: 24 U/L / GGT: x                 PT/INR - ( 2019 06:24 )   PT: 11.60 sec;   INR: 1.01 ratio         PTT - ( 2019 06:24 )  PTT:33.3 sec  CARDIAC MARKERS ( 2019 16:35 )  x     / <0.01 ng/mL / x     / x     / x            Urinalysis Basic - ( 2019 19:00 )    Color: Yellow / Appearance: Clear / S.020 / pH: x  Gluc: x / Ketone: Negative  / Bili: Negative / Urobili: 1.0 mg/dL   Blood: x / Protein: Trace mg/dL / Nitrite: Negative   Leuk Esterase: Negative / RBC: x / WBC x   Sq Epi: x / Non Sq Epi: Occasional /HPF / Bacteria: Few /HPF          Imaging:    ECG:

## 2019-05-01 NOTE — PROGRESS NOTE ADULT - ATTENDING COMMENTS
Pending (specify):  AICD battery replacement   Family discussion: Patient is discussed with   Disposition: Home

## 2019-05-02 ENCOUNTER — TRANSCRIPTION ENCOUNTER (OUTPATIENT)
Age: 84
End: 2019-05-02

## 2019-05-02 VITALS
HEART RATE: 69 BPM | TEMPERATURE: 96 F | RESPIRATION RATE: 18 BRPM | DIASTOLIC BLOOD PRESSURE: 70 MMHG | SYSTOLIC BLOOD PRESSURE: 160 MMHG

## 2019-05-02 LAB
ANION GAP SERPL CALC-SCNC: 12 MMOL/L — SIGNIFICANT CHANGE UP (ref 7–14)
BUN SERPL-MCNC: 32 MG/DL — HIGH (ref 10–20)
CALCIUM SERPL-MCNC: 9.5 MG/DL — SIGNIFICANT CHANGE UP (ref 8.5–10.1)
CHLORIDE SERPL-SCNC: 103 MMOL/L — SIGNIFICANT CHANGE UP (ref 98–110)
CO2 SERPL-SCNC: 26 MMOL/L — SIGNIFICANT CHANGE UP (ref 17–32)
CREAT SERPL-MCNC: 1.7 MG/DL — HIGH (ref 0.7–1.5)
GLUCOSE SERPL-MCNC: 86 MG/DL — SIGNIFICANT CHANGE UP (ref 70–99)
POTASSIUM SERPL-MCNC: 4.7 MMOL/L — SIGNIFICANT CHANGE UP (ref 3.5–5)
POTASSIUM SERPL-SCNC: 4.7 MMOL/L — SIGNIFICANT CHANGE UP (ref 3.5–5)
SODIUM SERPL-SCNC: 141 MMOL/L — SIGNIFICANT CHANGE UP (ref 135–146)

## 2019-05-02 PROCEDURE — 71045 X-RAY EXAM CHEST 1 VIEW: CPT | Mod: 26

## 2019-05-02 PROCEDURE — 33264 RMVL & RPLCMT DFB GEN MLT LD: CPT

## 2019-05-02 RX ORDER — CEPHALEXIN 500 MG
1 CAPSULE ORAL
Qty: 10 | Refills: 0
Start: 2019-05-02 | End: 2019-05-06

## 2019-05-02 RX ORDER — CEPHALEXIN 500 MG
1 CAPSULE ORAL
Qty: 10 | Refills: 0 | OUTPATIENT
Start: 2019-05-02 | End: 2019-05-06

## 2019-05-02 RX ORDER — VANCOMYCIN HCL 1 G
1000 VIAL (EA) INTRAVENOUS ONCE
Qty: 0 | Refills: 0 | Status: COMPLETED | OUTPATIENT
Start: 2019-05-02 | End: 2019-05-02

## 2019-05-02 RX ORDER — VANCOMYCIN HCL 1 G
750 VIAL (EA) INTRAVENOUS EVERY 12 HOURS
Qty: 0 | Refills: 0 | Status: DISCONTINUED | OUTPATIENT
Start: 2019-05-02 | End: 2019-05-02

## 2019-05-02 RX ADMIN — Medication 50 MILLIGRAM(S): at 05:50

## 2019-05-02 RX ADMIN — Medication 250 MILLIGRAM(S): at 08:02

## 2019-05-02 RX ADMIN — LOSARTAN POTASSIUM 100 MILLIGRAM(S): 100 TABLET, FILM COATED ORAL at 05:50

## 2019-05-02 NOTE — DISCHARGE NOTE PROVIDER - HOSPITAL COURSE
82 yo M pmh of HTN, HLD, HFrEF with AICD presents for battery replacement of AICD        Chronic HFrEF with AICD/PPM at end of service: currently paced    - Battery change Thursday    - 2D echo: EF:48%, GIDD, mild-mod aortic regurg MRSA swab: neg    - Continue with ARB, BB, and aldactone    -    Can discharge After interogation of device 84 yo M pmh of HTN, HLD, HFrEF with AICD presents for battery replacement of AICD        Chronic HFrEF with AICD/PPM at end of service: currently paced    - Battery change Today, device interogated and cleared for d/c     - 2D echo: EF:48%, GIDD, mild-mod aortic regurg MRSA swab: neg    - Continue with ARB, BB, and aldactone    -    Cleared by EP and hospitalist for Discharge

## 2019-05-02 NOTE — DISCHARGE NOTE PROVIDER - CARE PROVIDER_API CALL
Milton Almodovar; GEGE)  Cardiac Electrophysiology  [address] [place] [address]  Phone: [phone]  Fax: [phone]  Follow Up Time:     Cesario Trejo)  Family Medicine  [address] [place] [address]  Phone: [phone]  Fax: [phone]  Follow Up Time: Cesario Trejo)  Family Medicine  [address] [place] [address]  Phone: [phone]  Fax: [phone]  Follow Up Time:     Nahum Patricio)  Cardiac Electrophysiology; Cardiovascular Disease; [facility]  [address] [place] [address]  Phone: [phone]  Fax: [phone]  Follow Up Time:

## 2019-05-02 NOTE — CHART NOTE - NSCHARTNOTEFT_GEN_A_CORE
PACU ANESTHESIA ADMISSION NOTE      Procedure: BIV ICD battery change  Post op diagnosis:      ____  Intubated  TV:______       Rate: ______      FiO2: ______    _X___  Patent Airway    ____  Full return of protective reflexes    ____  Full recovery from anesthesia / back to baseline status    Vitals:  T: 97.7F  HR: 61  BP: 113/58  RR: 18  SpO2: 100%    Mental Status:  _X___ Awake   _____ Alert   _____ Drowsy   _____ Sedated    Nausea/Vomiting:  _X___ NO  ______Yes,   See Post - Op Orders          Pain Scale (0-10):  _____    Treatment: ____ None    _X___ See Post - Op/PCA Orders    Post - Operative Fluids:   ____ Oral   _X___ See Post - Op Orders    Plan: Discharge:   ____Home       __X___Floor     _____Critical Care    _____  Other:_________________    Comments: Pt. tolerated procedure well, transported to PACU, report endorsed to RN

## 2019-05-02 NOTE — DISCHARGE NOTE PROVIDER - NSDCCPCAREPLAN_GEN_ALL_CORE_FT
PRINCIPAL DISCHARGE DIAGNOSIS  Diagnosis: AICD battery failure  Assessment and Plan of Treatment: We replace your AICD batteries and your device is working well. Pls follow-up with your cardiologist for regular AICD check-up

## 2019-05-02 NOTE — CHART NOTE - NSCHARTNOTEFT_GEN_A_CORE
Electrophysiology Brief Post-Operative Note    I have personally seen and examined the patient.  I agree with the history and physical which I have reviewed and noted any changes below.  05-02-19 @ 10:05    PRE-OP DIAGNOSIS: Systolic heart failure    POST-OP DIAGNOSIS: Systolic heart failure    PROCEDURE:  Generator change of Biventricular Defibrillator      Physician: TALIA Patricio MD  Assistant: None    ESTIMATED BLOOD LOSS: 20  mL    ANESTHESIA TYPE:  [  ]General Anesthesia  [X] Sedation  [X] Local/Regional    CONDITION  [  ] Critical  [  ] Serious  [  ]Fair  [X]Good      SPECIMENS REMOVED (IF APPLICABLE): None      IMPLANTS (IF APPLICABLE)  Biventricular Defibrillator implant      FINDINGS  No immediate complications  Contrast used: none      PLAN OF CARE  - Keflex 500 mg q12h x 5days  - No anticoagulation unless discussed with EP attending  -Outpatient follow-up with me in one month  -can be discharged today

## 2019-05-02 NOTE — DISCHARGE NOTE NURSING/CASE MANAGEMENT/SOCIAL WORK - NSDCDPATPORTLINK_GEN_ALL_CORE
You can access the [facility] Patient Portal, offered by [facility], by registering with the following website: [email or web address]

## 2019-05-02 NOTE — PROGRESS NOTE ADULT - SUBJECTIVE AND OBJECTIVE BOX
no chest pain   no sob   doing well   had battery for AICD replaced today     Vital Signs Last 24 Hrs  T(C): 35.8 (02 May 2019 12:51), Max: 36.2 (01 May 2019 20:22)  T(F): 96.5 (02 May 2019 12:51), Max: 97.1 (01 May 2019 20:22)  HR: 69 (02 May 2019 12:51) (62 - 84)  BP: 160/70 (02 May 2019 12:51) (98/76 - 160/70)  BP(mean): --  RR: 18 (02 May 2019 12:51) (18 - 18)  SpO2: 100% (02 May 2019 08:13) (95% - 100%)    PHYSICAL EXAM:  GENERAL: NAD, well-developed  HEAD:  Atraumatic, Normocephalic  EYES: EOMI, PERRLA, conjunctiva and sclera clear  NECK: Supple, No JVD  CHEST/LUNG: Clear to auscultation bilaterally; No wheeze  HEART: Regular rate and rhythm; No murmurs, rubs, or gallops  ABDOMEN: Soft, Nontender, Nondistended; Bowel sounds present  EXTREMITIES:  2+ Peripheral Pulses, No clubbing, cyanosis, or edema  PSYCH: AAOx3  NEUROLOGY: non-focal      05-02    141  |  103  |  32<H>  ----------------------------<  86  4.7   |  26  |  1.7<H>    Ca    9.5      02 May 2019 06:26      MEDICATIONS  (STANDING):  losartan 100 milliGRAM(s) Oral daily  metoprolol tartrate 50 milliGRAM(s) Oral two times a day  simvastatin 20 milliGRAM(s) Oral at bedtime  spironolactone 25 milliGRAM(s) Oral <User Schedule>  vancomycin  IVPB 750 milliGRAM(s) IV Intermittent every 12 hours    MEDICATIONS  (PRN):

## 2019-05-02 NOTE — DISCHARGE NOTE PROVIDER - PROVIDER TOKENS
PROVIDER:[TOKEN:[[id number]]],PROVIDER:[TOKEN:[[id number]]] PROVIDER:[TOKEN:[[id number]]],PROVIDER:[TOKEN:[[id number]]]

## 2019-05-02 NOTE — DISCHARGE NOTE PROVIDER - CARE PROVIDERS DIRECT ADDRESSES
[email or web address]

## 2019-05-02 NOTE — DISCHARGE NOTE PROVIDER - NSDCFUADDAPPT_GEN_ALL_CORE_FT
Follow-up with PMD in 1-2 weeks  Follow-up with Cardiologist   Follow-up with DR Almodovar Follow-up with PMD in 1-2 weeks  Follow-up with Cardiologist   Follow-up with DR Patricio in one month

## 2019-05-02 NOTE — DISCHARGE NOTE NURSING/CASE MANAGEMENT/SOCIAL WORK - NSDCFUADDAPPT_GEN_ALL_CORE_FT
Follow-up with PMD in 1-2 weeks  Follow-up with Cardiologist   Follow-up with DR Patricio in one month

## 2019-05-07 DIAGNOSIS — I13.0 HYPERTENSIVE HEART AND CHRONIC KIDNEY DISEASE WITH HEART FAILURE AND STAGE 1 THROUGH STAGE 4 CHRONIC KIDNEY DISEASE, OR UNSPECIFIED CHRONIC KIDNEY DISEASE: ICD-10-CM

## 2019-05-07 DIAGNOSIS — Z45.02 ENCOUNTER FOR ADJUSTMENT AND MANAGEMENT OF AUTOMATIC IMPLANTABLE CARDIAC DEFIBRILLATOR: ICD-10-CM

## 2019-05-07 DIAGNOSIS — N18.3 CHRONIC KIDNEY DISEASE, STAGE 3 (MODERATE): ICD-10-CM

## 2019-05-07 DIAGNOSIS — I42.8 OTHER CARDIOMYOPATHIES: ICD-10-CM

## 2019-05-07 DIAGNOSIS — I50.22 CHRONIC SYSTOLIC (CONGESTIVE) HEART FAILURE: ICD-10-CM

## 2019-05-07 DIAGNOSIS — I35.1 NONRHEUMATIC AORTIC (VALVE) INSUFFICIENCY: ICD-10-CM

## 2019-05-07 DIAGNOSIS — E78.5 HYPERLIPIDEMIA, UNSPECIFIED: ICD-10-CM

## 2019-05-07 DIAGNOSIS — Z87.891 PERSONAL HISTORY OF NICOTINE DEPENDENCE: ICD-10-CM

## 2019-05-08 PROBLEM — E78.5 HYPERLIPIDEMIA, UNSPECIFIED: Chronic | Status: ACTIVE | Noted: 2019-04-29

## 2019-06-17 ENCOUNTER — APPOINTMENT (OUTPATIENT)
Dept: CARDIOLOGY | Facility: CLINIC | Age: 84
End: 2019-06-17

## 2019-06-21 NOTE — PATIENT PROFILE ADULT - NSPROMEDSHERBAL_GEN_A_NUR
Has the patient had nutrition/diet/diabetes mellitus education within the past year: 2/2019   Denies need for refresher.    Date of last dilated ophthalmology exam= 4/2019  Retinopathy=no  Eye report in Epic.     Checks feet daily: yes  Offered foot care FYWB.  Denies need for podiatrist.  Last Endo foot exam=9/14/2018      Last Influenza vaccine: 11/2018    Medical ID: no  Rebeka has been informed of its benefit.      Date of last dental exam=years ago           no

## 2019-07-15 ENCOUNTER — APPOINTMENT (OUTPATIENT)
Dept: UROLOGY | Facility: CLINIC | Age: 84
End: 2019-07-15
Payer: COMMERCIAL

## 2019-07-15 ENCOUNTER — TRANSCRIPTION ENCOUNTER (OUTPATIENT)
Age: 84
End: 2019-07-15

## 2019-07-15 PROCEDURE — 99214 OFFICE O/P EST MOD 30 MIN: CPT

## 2019-07-15 NOTE — HISTORY OF PRESENT ILLNESS
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 no pain in the right inguinal region. Also history of low Testosterone. PAtient currently on testosterone Androgel 2 pumps a day for over a year. Without it feels weak, faint and sweating. all of these symptoms resolve with testosterone. Patient states feeling good, no complaints. Patient started SIldenafil for ED and satisfied with results.\par \par 12/13/2018\par -Normal Hepatic Function panel\par -Total testosterone 281(250-1100), Bioavailable 82.9 (),SHBG 28 (22-77), Estradiol 52 H (<39), ALbumin 4.5 (3.6-5.1)\par -PSA 6.7 ng/ml, 25 % Free PSA (PSA 8.7 on 8/14/2018)\par \par July 2017: sonogram\par IMPRESSION:\par Right testicle is in the right inguinal canal. No testicular mass.\par \par June 2019\par US Doppler Scrotum\par right testis in inguinal canal -- no mass seen.\par Total T = 297, bioavail = 91\par \par

## 2019-07-15 NOTE — ASSESSMENT
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 no pain in the right inguinal region. Also history of low Testosterone. PAtient currently on testosterone Androgel 2 pumps a day for over a year. Without it feels weak, faint and sweating. all of these symptoms resolve with testosterone. Patient states feeling good, no complaints. Patient started SIldenafil for ED and satisfied with results.\par \par 12/13/2018\par -Normal Hepatic Function panel\par -Total testosterone 281(250-1100), Bioavailable 82.9 (),SHBG 28 (22-77), Estradiol 52 H (<39), ALbumin 4.5 (3.6-5.1)\par -PSA 6.7 ng/ml, 25 % Free PSA (PSA 8.7 on 8/14/2018)\par \par July 2017: sonogram\par IMPRESSION:\par Right testicle is in the right inguinal canal. No testicular mass.\par \par June 2019\par US Doppler Scrotum\par right testis in inguinal canal -- no mass seen.\par Total T = 297, bioavail = 91\par

## 2020-01-27 ENCOUNTER — APPOINTMENT (OUTPATIENT)
Dept: UROLOGY | Facility: CLINIC | Age: 85
End: 2020-01-27
Payer: COMMERCIAL

## 2020-01-27 PROCEDURE — 99214 OFFICE O/P EST MOD 30 MIN: CPT

## 2020-02-05 NOTE — HISTORY OF PRESENT ILLNESS
[None] : None [Currently Experiencing ___] :  [unfilled] [Erectile Dysfunction] : Erectile Dysfunction [FreeTextEntry1] : JAMEE STATON is a 84 year old male with a past medical history of left testicular cancer in 1988 with lymph node dissection and a year ago had CT which showed no cancer.Also had retractile R testicle which was brought down by Dr Acevedo but then it went back up.In addition, he has a  history of low Testosterone. PAtient currently on testosterone Androgel 2 pumps a day for over a year. Without it feels weak, faint and sweating. all of these symptoms resolve with testosterone. Patient states feeling good, no complaints. Voiding well with a good flow.  Patient started SIldenafil for ED and satisfied with results.\par \par Labs 1/7/2020\par -Total testosterone 475 (250-1100), bioavailable 143.5 (), SHBG 29 (22-77), Albumin 4.4 (3.6-5.1)\par \par June 2019\par US Doppler Scrotum\par right testis in inguinal canal -- no mass seen.\par Total T = 297, bioavail = 91\par \par 12/13/2018\par -Normal Hepatic Function panel\par -Total testosterone 281(250-1100), Bioavailable 82.9 (),SHBG 28 (22-77), Estradiol 52 H (<39), ALbumin 4.5 (3.6-5.1)\par -PSA 6.7 ng/ml, 25 % Free PSA (PSA 8.7 on 8/14/2018)

## 2020-02-05 NOTE — ASSESSMENT
[FreeTextEntry1] : JAMEE STATON is a 84 year old male with a past medical history of left testicular cancer in 1988 with lymph node dissection and a year ago had CT which showed no cancer.Also had retractile R testicle which was brought down by Dr Acevedo but then it went back up.In addition, he has a  history of low Testosterone. PAtient currently on testosterone Androgel 2 pumps a day for over a year. Without it feels weak, faint and sweating. all of these symptoms resolve with testosterone. Patient states feeling good, no complaints. Voiding well with a good flow.  Patient started SIldenafil for ED and satisfied with results.\par \par Labs 1/7/2020\par -Total testosterone 475 (250-1100), bioavailable 143.5 (), SHBG 29 (22-77), Albumin 4.4 (3.6-5.1)\par \par June 2019\par US Doppler Scrotum\par right testis in inguinal canal -- no mass seen.\par Total T = 297, bioavail = 91\par \par 12/13/2018\par -Normal Hepatic Function panel\par -Total testosterone 281(250-1100), Bioavailable 82.9 (),SHBG 28 (22-77), Estradiol 52 H (<39), ALbumin 4.5 (3.6-5.1)\par -PSA 6.7 ng/ml, 25 % Free PSA (PSA 8.7 on 8/14/2018)

## 2020-08-03 ENCOUNTER — APPOINTMENT (OUTPATIENT)
Dept: UROLOGY | Facility: CLINIC | Age: 85
End: 2020-08-03
Payer: COMMERCIAL

## 2020-08-03 VITALS — TEMPERATURE: 98.5 F | WEIGHT: 170 LBS | BODY MASS INDEX: 24.34 KG/M2 | HEIGHT: 70 IN

## 2020-08-03 DIAGNOSIS — N28.1 CYST OF KIDNEY, ACQUIRED: ICD-10-CM

## 2020-08-03 PROCEDURE — 99214 OFFICE O/P EST MOD 30 MIN: CPT

## 2020-08-03 NOTE — PHYSICAL EXAM
[General Appearance - Well Nourished] : well nourished [General Appearance - Well Developed] : well developed [Normal Appearance] : normal appearance [Well Groomed] : well groomed [Abdomen Soft] : soft [General Appearance - In No Acute Distress] : no acute distress [Abdomen Tenderness] : non-tender [Costovertebral Angle Tenderness] : no ~M costovertebral angle tenderness [Skin Color & Pigmentation] : normal skin color and pigmentation [Edema] : no peripheral edema [] : no respiratory distress [Exaggerated Use Of Accessory Muscles For Inspiration] : no accessory muscle use [Respiration, Rhythm And Depth] : normal respiratory rhythm and effort [Oriented To Time, Place, And Person] : oriented to person, place, and time [Affect] : the affect was normal [Not Anxious] : not anxious [Mood] : the mood was normal [Normal Station and Gait] : the gait and station were normal for the patient's age [No Focal Deficits] : no focal deficits

## 2020-08-03 NOTE — HISTORY OF PRESENT ILLNESS
[FreeTextEntry1] : JAMEE STATON is a 85 year old male with a past medical history of left testicular cancer in 1988 with lymph node dissection and a year ago had CT which showed no cancer. Also had retractile R testicle which was brought down by Dr Acevedo but then it went back up.In addition, he has a history of low Testosterone. PAtient currently on testosterone Androgel 2 pumps a day for over a year. Without it feels weak, faint and sweating. all of these symptoms resolve with testosterone. Patient states feeling good, no complaints. Voiding well with a good flow. Patient started SIldenafil for ED and satisfied with results.\par \par Labs 1/7/2020\par -Total testosterone 475 (250-1100), bioavailable 143.5 (), SHBG 29 (22-77), Albumin 4.4 (3.6-5.1)\par \par June 2019\par US Doppler Scrotum\par right testis in inguinal canal -- no mass seen.\par Total T = 297, bioavail = 91\par \par 12/13/2018\par -Normal Hepatic Function panel\par -Total testosterone 281(250-1100), Bioavailable 82.9 (),SHBG 28 (22-77), Estradiol 52 H (<39), ALbumin 4.5 (3.6-5.1)\par -PSA 6.7 ng/ml, 25 % Free PSA (PSA 8.7 on 8/14/2018). \par \par  \par US Duplex Scrotal - not done\par \par July 2020\par Bioavailable Testostertone Total; 157\par Complete Blood Count  - hct 45\par Testosterone Free and Total; 503\par \par July 2020\par CT -- images visualized by me == IMPRESSION:  \par 1. Numerous surgical clips surrounding the left kidney possibly accounting for the abnormal sonogram. Simple left renal cyst with no stone.\par 2. Significant prostate enlargement with bladder stone and bladder wall thickening consistent with outlet obstruction.\par 3. Status post lymphadenectomy with numerous retroperitoneal clips. No evidence of metastatic disease.\par

## 2020-08-03 NOTE — ASSESSMENT
[FreeTextEntry1] : JAMEE STATON is a 85 year old male with a past medical history of left testicular cancer in 1988 with lymph node dissection and a year ago had CT which showed no cancer. Also had retractile R testicle which was brought down by Dr Acevedo but then it went back up.In addition, he has a history of low Testosterone. PAtient currently on testosterone Androgel 2 pumps a day for over a year. Without it feels weak, faint and sweating. all of these symptoms resolve with testosterone. Patient states feeling good, no complaints. Voiding well with a good flow. Patient started SIldenafil for ED and satisfied with results.\par \par Labs 1/7/2020\par -Total testosterone 475 (250-1100), bioavailable 143.5 (), SHBG 29 (22-77), Albumin 4.4 (3.6-5.1)\par \par June 2019\par US Doppler Scrotum\par right testis in inguinal canal -- no mass seen.\par Total T = 297, bioavail = 91\par \par 12/13/2018\par -Normal Hepatic Function panel\par -Total testosterone 281(250-1100), Bioavailable 82.9 (),SHBG 28 (22-77), Estradiol 52 H (<39), ALbumin 4.5 (3.6-5.1)\par -PSA 6.7 ng/ml, 25 % Free PSA (PSA 8.7 on 8/14/2018). \par \par  \par US Duplex Scrotal - not done\par \par July 2020\par Bioavailable Testostertone Total; 157\par Complete Blood Count  - hct 45\par Testosterone Free and Total; 503\par \par July 2020\par CT -- images visualized by me == IMPRESSION:  \par 1. Numerous surgical clips surrounding the left kidney possibly accounting for the abnormal sonogram. Simple left renal cyst with no stone.\par 2. Significant prostate enlargement with bladder stone and bladder wall thickening consistent with outlet obstruction.\par 3. Status post lymphadenectomy with numerous retroperitoneal clips. No evidence of metastatic disease.

## 2020-10-04 NOTE — DISCHARGE NOTE NURSING/CASE MANAGEMENT/SOCIAL WORK - NSDCPEPT PROEDMA_GEN_ALL_CORE
Department of Emergency Medicine   ED  Provider Note  Admit Date/RoomTime: 10/4/2020  1:15 PM  ED Room: 17/17          History of Present Illness:  10/4/20, Time: 2:36 PM EDT  Chief Complaint   Patient presents with    Shortness of Breath     has been sick for a week and worsening for 3 days SOB, Fever, Diarrhea, HA, Cough                Ashly Jacome is a 48 y.o. female presenting to the ED for sob , beginning 1 week. The complaint has been persistent, severe in severity, and worsened by nothing. Patient presents for shortness of breath. States it started 1 week ago. Complains of shortness of breath fever occasionally productive cough. She reports she had a negative COVID test 2 weeks ago after an exposure, however she is not sure of who the exposure was and then states no one was actually positive around her. She denies anosmia or loss of sense of taste. Is a current everyday smoker. Complains of wheezing shortness of breath and some chest discomfort. Review of Systems:   Pertinent positives and negatives are stated within HPI, all other systems reviewed and are negative.        --------------------------------------------- PAST HISTORY ---------------------------------------------  Past Medical History:  has a past medical history of CHF (congestive heart failure) ([facility] COPD (chronic obstructive pulmonary disease) ([facility] Hep C w/o coma, chronic ([facility] and RA (rheumatoid arthritis) ([facility] Past Surgical History:  has a past surgical history that includes Tubal ligation. Social History:  reports that she has been smoking cigarettes. She has a 15.00 pack-year smoking history. She has never used smokeless tobacco. She reports current alcohol use. She reports that she does not use drugs. Family History: family history includes Diabetes in her father; Heart Disease in her father; Other in her mother. . Unless otherwise noted, family history is non contributory    The patients home medications have been reviewed. Allergies: Patient has no known allergies. ---------------------------------------------------PHYSICAL EXAM--------------------------------------    Constitutional/General: Alert and oriented x3  Head: Normocephalic and atraumatic  Eyes: PERRL, EOMI, sclera non icteric  Mouth: Oropharynx clear, handling secretions, no trismus, no asymmetry of the posterior oropharynx or uvular edema  Neck: Supple, full ROM, no stridor, no meningeal signs  Respiratory: Coarse lung sounds with rhonchi worse at the right base,Not in respiratory distress  Cardiovascular: Tachycardic and regular 2+ distal pulses. Equal extremity pulses. Chest: No chest wall tenderness  GI:  Abdomen Soft, Non tender, Non distended. No rebound, guarding, or rigidity. Musculoskeletal: Moves all extremities x 4. Warm and well perfused, no clubbing, cyanosis, or edema. Capillary refill <3 seconds  Integument: skin warm and diaphoretic  Neurologic: GCS 15, no focal deficits, symmetric strength 5/5 in the upper and lower extremities bilaterally  Psychiatric: Normal Affect      EKG: Interpreted by emergency department physician, Dr. Jones Bryant   This EKG is signed and interpreted by me. Rate: 140  Rhythm: Sinus  Interpretation: Sinus rhythm sinus tachycardia, normal axis, IN is 122, QRS is 8 0, QTc is 458, diffuse this change which are nonspecific. There is no prior for comparison at this time  Comparison: no previous EKG available      -------------------------------------------------- RESULTS -------------------------------------------------  I have personally reviewed all laboratory and imaging results for this patient. Results are listed below.      LABS: (Lab results interpreted by me)  Results for orders placed or performed during the hospital encounter of 10/04/20   Troponin   Result Value Ref Range    Troponin <0.01 0.00 - 0.03 ng/mL   CBC Auto Differential   Result Value Ref Range    WBC 28.5 (H) 4.5 - 11.5 E9/L RBC 4.97 3.50 - 5.50 E12/L    Hemoglobin 16.2 (H) 11.5 - 15.5 g/dL    Hematocrit 47.1 34.0 - 48.0 %    MCV 94.8 80.0 - 99.9 fL    MCH 32.6 26.0 - 35.0 pg    MCHC 34.4 32.0 - 34.5 %    RDW 13.9 11.5 - 15.0 fL    Platelets 906 565 - 883 E9/L    MPV 10.4 7.0 - 12.0 fL   Comprehensive Metabolic Panel   Result Value Ref Range    Sodium 133 132 - 146 mmol/L    Potassium 3.4 (L) 3.5 - 5.0 mmol/L    Chloride 93 (L) 98 - 107 mmol/L    CO2 22 22 - 29 mmol/L    Anion Gap 18 (H) 7 - 16 mmol/L    Glucose 154 (H) 74 - 99 mg/dL    BUN 7 6 - 20 mg/dL    CREATININE 0.5 0.5 - 1.0 mg/dL    GFR Non-African American >60 >=60 mL/min/1.73    GFR African American >60     Calcium 8.6 8.6 - 10.2 mg/dL    Total Protein 7.4 6.4 - 8.3 g/dL    Alb 3.9 3.5 - 5.2 g/dL    Total Bilirubin 1.6 (H) 0.0 - 1.2 mg/dL    Alkaline Phosphatase 131 (H) 35 - 104 U/L    ALT 18 0 - 32 U/L    AST 48 (H) 0 - 31 U/L   D-Dimer, Quantitative   Result Value Ref Range    D-Dimer, Quant 538 ng/mL DDU   Brain Natriuretic Peptide   Result Value Ref Range    Pro- 0 - 125 pg/mL   Magnesium   Result Value Ref Range    Magnesium 1.4 (L) 1.6 - 2.6 mg/dL   Lactate, Sepsis   Result Value Ref Range    Lactic Acid, Sepsis 3.6 (H) 0.5 - 1.9 mmol/L   COVID-19   Result Value Ref Range    SARS-CoV-2, NAAT Not Detected Not Detected   Blood Gas, Arterial   Result Value Ref Range    Date Analyzed 20201004     Time Analyzed 1353     Source: Blood Arterial     pH, Blood Gas 7.521 (H) 7.350 - 7.450    PCO2 26.6 (L) 35.0 - 45.0 mmHg    PO2 45.5 (L) 75.0 - 100.0 mmHg    HCO3 21.3 (L) 22.0 - 26.0 mmol/L    B.E. 0.1 -3.0 - 3.0 mmol/L    O2 Sat 87.5 (L) 92.0 - 98.5 %    O2Hb 81.7 (L) 94.0 - 97.0 %    COHb 6.4 (H) 0.0 - 1.5 %    MetHb 0.2 0.0 - 1.5 %    O2 Content 18.0 mL/dL    HHb 11.7 (H) 0.0 - 5.0 %    tHb (est) 15.7 11.5 - 16.5 g/dL    Mode RA     Date Of Collection      Time Collected      Pt Temp 37.0 C     ID 30     Lab 10706     Critical(s) Notified .  No Critical Values Yes ,       RADIOLOGY:  Interpreted by Radiologist unless otherwise specified  CTA CHEST W CONTRAST   Final Result   1. Multifocal pneumonia. Recommend chest radiograph in 8 weeks to confirm   resolution. 2. Reactive right hilar and mediastinal adenopathy. XR CHEST PORTABLE   Final Result   1. Right lower lobe pneumonia                          ------------------------- NURSING NOTES AND VITALS REVIEWED ---------------------------   The nursing notes within the ED encounter and vital signs as below have been reviewed by myself  /87   Pulse 139   Temp 97.9 °F (36.6 °C)   Resp 16   Ht 5' 11\" (1.803 m)   Wt 166 lb (75.3 kg)   LMP 08/01/2018   SpO2 92%   BMI 23.15 kg/m²     Oxygen Saturation Interpretation: Abnormal    The cardiac monitor revealed NSR with ST with a heart rate in the 140s as interpreted by me. The cardiac monitor was ordered secondary to the patient's heart rate and to monitor the patient for dysrhythmia. CPT 95185    The patients available past medical records and past encounters were reviewed.         ------------------------------ ED COURSE/MEDICAL DECISION MAKING----------------------  Medications   0.9 % sodium chloride bolus (2,259 mLs Intravenous New Bag 10/4/20 1411)   sodium chloride flush 0.9 % injection 10 mL (has no administration in time range)   sodium chloride flush 0.9 % injection 10 mL (has no administration in time range)   cefTRIAXone (ROCEPHIN) 2 g in sterile water 20 mL IV syringe (has no administration in time range)   doxycycline hyclate (VIBRAMYCIN) capsule 100 mg (has no administration in time range)   ketorolac (TORADOL) injection 15 mg (has no administration in time range)   ipratropium-albuterol (DUONEB) nebulizer solution 1 ampule (1 ampule Inhalation Given 10/4/20 1346)   dexamethasone (DECADRON) injection 6 mg (6 mg Intravenous Given 10/4/20 1340)   terbutaline (BRETHINE) injection 0.25 mg (0.25 mg Subcutaneous Given 10/4/20 9306)   montelukast (SINGULAIR) tablet 10 mg (10 mg Oral Given 10/4/20 1875)                    Medical Decision Making:     I, Dr. Kd Padgett in the primary provider of record    Patient presents with shortness of breath progressively worsening over the last week but significantly worsened today. Upon arrival she is in respiratory distress with rhonchi, rapid coronavirus test was negative, leukocytosis with lactic acidosis, broad-spectrum antibiotics initiated, CT angiogram was obtained second elevated d-dimer which did not reveal pulmonary embolism. Spoke with medicine patient will be admitted      Re-Evaluations:       Time: 1430  Re-evaluation. Patients symptoms are improving  Repeat physical examination is improved      Sepsis Re-examination  10/4/20   1630 PM EDT          Vital Signs:   Vitals:    10/04/20 1736 10/04/20 1737 10/04/20 1815 10/04/20 1915   BP:   130/68 127/70   Pulse: 93 90 92 95   Resp:   18 20   Temp:   97.8 °F (36.6 °C) 98.3 °F (36.8 °C)   TempSrc:   Oral Oral   SpO2: 93% 94% 97% 93%   Weight:   163 lb (73.9 kg)    Height:   5' 10\" (1.778 m)      Card/Pulm:  Rhythm: normal rate. Heart Sounds: Normal S1, S2. rhonchi. Capillary Refill: normal.  Radial Pulse:  present 2+. Skin:  Warm. This patient's ED course included: a personal history and physicial examination, multiple bedside re-evaluations, IV medications, cardiac monitoring, continuous pulse oximetry and complex medical decision making and emergency management    This patient has been closely monitored during their ED course. Consultations:  Internal Medicine       Critical Care: Please note that the withdrawal or failure to initiate urgent interventions for this patient would likely result in a life threatening deterioration or permanent disability. Accordingly this patient received 33 minutes of critical care time, excluding separately billable procedures. Counseling:    The emergency provider has spoken with the patient and discussed todays results, in addition to providing specific details for the plan of care and counseling regarding the diagnosis and prognosis. Questions are answered at this time and they are agreeable with the plan.       --------------------------------- IMPRESSION AND DISPOSITION ---------------------------------    IMPRESSION  1. Sepsis, due to unspecified organism, unspecified whether acute organ dysfunction present ([facility]    2. Community acquired pneumonia of right lower lobe of lung    3. COVID-19 virus not detected        DISPOSITION  Disposition: Admit    Patient condition is stable        NOTE: This report was transcribed using voice recognition software.  Every effort was made to ensure accuracy; however, inadvertent computerized transcription errors may be present       Doc Medrano DO  10/04/20 9508

## 2020-11-30 RX ORDER — TESTOSTERONE 20.25 MG/1.25G
20.25 MG/1.25GM GEL TOPICAL
Qty: 3 | Refills: 0 | Status: DISCONTINUED | COMMUNITY
Start: 2017-09-13 | End: 2020-11-30

## 2021-02-08 ENCOUNTER — APPOINTMENT (OUTPATIENT)
Dept: UROLOGY | Facility: CLINIC | Age: 86
End: 2021-02-08
Payer: COMMERCIAL

## 2021-02-08 VITALS
HEART RATE: 73 BPM | SYSTOLIC BLOOD PRESSURE: 160 MMHG | HEIGHT: 70 IN | TEMPERATURE: 98.9 F | BODY MASS INDEX: 24.34 KG/M2 | WEIGHT: 170 LBS | DIASTOLIC BLOOD PRESSURE: 80 MMHG

## 2021-02-08 PROCEDURE — 99072 ADDL SUPL MATRL&STAF TM PHE: CPT

## 2021-02-08 PROCEDURE — 99214 OFFICE O/P EST MOD 30 MIN: CPT

## 2021-02-08 NOTE — HISTORY OF PRESENT ILLNESS
[FreeTextEntry1] : JAMEE STATON is a 85 year old male with a past medical history of left testicular cancer in 1988 with lymph node dissection and 1.5 years ago had CT which showed no cancer. Also had retractile R testicle which was brought down by Dr Acevedo but then it went back up.  In addition, he has a history of low Testosterone. PAtient currently on testosterone Androgel 2 pumps a day for over a year. Without it feels weak, faint and sweating. all of these symptoms resolve with testosterone. Patient states feeling good, no complaints. Voiding well with a good flow. Patient started SIldenafil for ED and satisfied with results.\par \par Labs 1/7/2020\par -Total testosterone 475 (250-1100), bioavailable 143.5 (), SHBG 29 (22-77), Albumin 4.4 (3.6-5.1)\par \par June 2019\par US Doppler Scrotum\par right testis in inguinal canal -- no mass seen.\par Total T = 297, bioavail = 91\par \par 12/13/2018\par -Normal Hepatic Function panel\par -Total testosterone 281(250-1100), Bioavailable 82.9 (),SHBG 28 (22-77), Estradiol 52 H (<39), ALbumin 4.5 (3.6-5.1)\par -PSA 6.7 ng/ml, 25 % Free PSA (PSA 8.7 on 8/14/2018). \par \par  \par US Duplex Scrotal - not done\par \par July 2020\par Bioavailable Testostertone Total; 157\par Complete Blood Count - hct 45\par Testosterone Free and Total; 503\par \par July 2020\par CT -- images visualized by me == IMPRESSION: \par 1. Numerous surgical clips surrounding the left kidney possibly accounting for the abnormal sonogram. Simple left renal cyst with no stone.\par 2. Significant prostate enlargement with bladder stone and bladder wall thickening consistent with outlet obstruction.\par 3. Status post lymphadenectomy with numerous retroperitoneal clips. No evidence of metastatic disease. \par \par Testosterone, Total Serum; was not repeated

## 2021-03-04 ENCOUNTER — APPOINTMENT (OUTPATIENT)
Dept: CARDIOLOGY | Facility: CLINIC | Age: 86
End: 2021-03-04
Payer: COMMERCIAL

## 2021-03-04 ENCOUNTER — APPOINTMENT (OUTPATIENT)
Dept: CARDIOLOGY | Facility: CLINIC | Age: 86
End: 2021-03-04

## 2021-03-04 VITALS — HEART RATE: 60 BPM

## 2021-03-04 PROCEDURE — 99213 OFFICE O/P EST LOW 20 MIN: CPT

## 2021-03-04 PROCEDURE — 99072 ADDL SUPL MATRL&STAF TM PHE: CPT

## 2021-03-04 PROCEDURE — 93284 PRGRMG EVAL IMPLANTABLE DFB: CPT

## 2021-03-05 NOTE — HISTORY OF PRESENT ILLNESS
[de-identified] : 84 yo M pmh of HTN, HLD, HFrEF with AICD. Patient came to initiate care for ICD

## 2021-03-05 NOTE — PROCEDURE
[See Scanned Paceart Report] : See scanned paceart report [See Device Printout] : See device printout [CRT-D] : Cardiac resynchronization therapy defibrillator [de-identified] : [employer] [de-identified] : No significant evetns

## 2021-04-23 ENCOUNTER — OUTPATIENT (OUTPATIENT)
Dept: OUTPATIENT SERVICES | Facility: HOSPITAL | Age: 86
LOS: 1 days | Discharge: HOME | End: 2021-04-23
Payer: COMMERCIAL

## 2021-04-23 ENCOUNTER — RESULT REVIEW (OUTPATIENT)
Age: 86
End: 2021-04-23

## 2021-04-23 DIAGNOSIS — Z98.890 OTHER SPECIFIED POSTPROCEDURAL STATES: Chronic | ICD-10-CM

## 2021-04-23 DIAGNOSIS — Z85.47 PERSONAL HISTORY OF MALIGNANT NEOPLASM OF TESTIS: ICD-10-CM

## 2021-04-23 PROCEDURE — 76870 US EXAM SCROTUM: CPT | Mod: 26

## 2021-05-10 ENCOUNTER — APPOINTMENT (OUTPATIENT)
Dept: UROLOGY | Facility: CLINIC | Age: 86
End: 2021-05-10
Payer: COMMERCIAL

## 2021-05-10 PROCEDURE — 99072 ADDL SUPL MATRL&STAF TM PHE: CPT

## 2021-05-10 PROCEDURE — 99214 OFFICE O/P EST MOD 30 MIN: CPT

## 2021-05-10 NOTE — PHYSICAL EXAM
[General Appearance - In No Acute Distress] : no acute distress [Skin Color & Pigmentation] : normal skin color and pigmentation [] : no respiratory distress [Oriented To Time, Place, And Person] : oriented to person, place, and time [Normal Station and Gait] : the gait and station were normal for the patient's age

## 2021-05-24 NOTE — ASSESSMENT
[FreeTextEntry1] : Patient is an 85 year old male with history of Left Testicular cancer and R sided retractile testicle and low testosterone.\par Patient repeat labs and US were reviewed with the patient. \par Patient has weakening of urinary stream but does not want medication at this time as he feels he is emptying appropriately. \par \par Plan\par -Repeat blood work. CBC, Hepatic Function Panel, and Total T in 6 months\par -Follow up 6 months after blood drawn\par -Repeat Sonogram in 1 year at next visit.

## 2021-05-24 NOTE — REVIEW OF SYSTEMS
[Fever] : no fever [Chills] : no chills [Chest Pain] : no chest pain [Shortness Of Breath] : no shortness of breath [Abdominal Pain] : no abdominal pain

## 2021-05-24 NOTE — ADDENDUM
[FreeTextEntry1] : Patient discussed with PA/NP.  I agree with the above plan and have made any corrections accordingly.\par discuss bladder stone at next visit

## 2021-05-24 NOTE — HISTORY OF PRESENT ILLNESS
[FreeTextEntry1] : Patient is a 85 year old male with history of left testicular cancer 1988 with lymph node dissection and R retractile testicle which was brought down by Dr. Acevedo but then retracted again. Patient is on Testosterone replacement therapy for Low T. Currently patient takes AndroGel 2 pumps a day for over a year. \par Patient reports he has good energy, does not feel faint, and does not have any episodes of excessive sweating. \par Patient has nocturia 1-2 x a night. Patient reports weak flow. Patient does not want any medication at this time. \par Denies dysuria and gross hematuria. \par Patient does not take Sildenafil for erections due to lack of activity. Patient does want to stay on the medication in the event that he will become intimate again. Patient does not take any Nitroglycerine. \par \par Labs 05/2021\par Total T- 431\par Creatine- 1.96\par GFR- 31\par AST- 16\par ALT-16 \par HGB- 16.2\par Hct- 49.3\par \par US Testicles 04/2021\par -Partially undescended right testicle, atrophic. No focal masses\par -S/p left orchiectomy \par \par Old Records:\par Labs 1/7/2020\par -Total testosterone 475 (250-1100), bioavailable 143.5 (), SHBG 29 (22-77), Albumin 4.4 (3.6-5.1)\par \par June 2019\par US Doppler Scrotum\par right testis in inguinal canal -- no mass seen.\par Total T = 297, bioavail = 91\par \par US Duplex Scrotal - not done\par \par July 2020\par Bioavailable Testostertone Total; 157\par Complete Blood Count - hct 45\par Testosterone Free and Total; 503\par \par July 2020\par CT -- images visualized by me == IMPRESSION: \par 1. Numerous surgical clips surrounding the left kidney possibly accounting for the abnormal sonogram. Simple left renal cyst with no stone.\par 2. Significant prostate enlargement with bladder stone and bladder wall thickening consistent with outlet obstruction.\par 3. Status post lymphadenectomy with numerous retroperitoneal clips. No evidence of metastatic disease.

## 2021-06-03 ENCOUNTER — APPOINTMENT (OUTPATIENT)
Dept: CARDIOLOGY | Facility: CLINIC | Age: 86
End: 2021-06-03
Payer: COMMERCIAL

## 2021-06-03 ENCOUNTER — NON-APPOINTMENT (OUTPATIENT)
Age: 86
End: 2021-06-03

## 2021-06-03 PROCEDURE — 93295 DEV INTERROG REMOTE 1/2/MLT: CPT

## 2021-06-03 PROCEDURE — 93296 REM INTERROG EVL PM/IDS: CPT

## 2021-09-13 ENCOUNTER — APPOINTMENT (OUTPATIENT)
Dept: CARDIOLOGY | Facility: CLINIC | Age: 86
End: 2021-09-13
Payer: COMMERCIAL

## 2021-09-13 ENCOUNTER — NON-APPOINTMENT (OUTPATIENT)
Age: 86
End: 2021-09-13

## 2021-09-13 PROCEDURE — 93296 REM INTERROG EVL PM/IDS: CPT

## 2021-09-13 PROCEDURE — 93295 DEV INTERROG REMOTE 1/2/MLT: CPT
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
## 2021-11-11 ENCOUNTER — APPOINTMENT (OUTPATIENT)
Dept: CARDIOLOGY | Facility: CLINIC | Age: 86
End: 2021-11-11

## 2021-11-29 ENCOUNTER — APPOINTMENT (OUTPATIENT)
Dept: UROLOGY | Facility: CLINIC | Age: 86
End: 2021-11-29
Payer: COMMERCIAL

## 2021-11-29 VITALS — HEIGHT: 70 IN | BODY MASS INDEX: 24.34 KG/M2 | WEIGHT: 170 LBS

## 2021-11-29 DIAGNOSIS — Q53.112 UNILATERAL INGUINAL TESTIS: ICD-10-CM

## 2021-11-29 DIAGNOSIS — Z85.47 PERSONAL HISTORY OF MALIGNANT NEOPLASM OF TESTIS: ICD-10-CM

## 2021-11-29 PROCEDURE — 99214 OFFICE O/P EST MOD 30 MIN: CPT

## 2021-11-29 NOTE — HISTORY OF PRESENT ILLNESS
[FreeTextEntry1] : Patient is an 86 year old presents for 6 month follow up. Patient is followed for history of testicular cancer s/p orchiectomy in 1988 and retractile right testicle. \par Patient has been on Testosterone Replacement Therapy for Low T for many years managed on AndroGel 2 pumps daily. \par Patient continues to have good energy and reports feeling well. \par Patient continues to complain of weak urinary stream but declines treatment as he feels he empties his bladder appropriately. Patient denies dysuria and gross hematuria. \par \par Patient does not take Sildenafil for erections due to lack of activity. Patient does want to stay on the medication in the event that he will become intimate again. Patient does not take any Nitroglycerine.\par \par Labs 11/2021\par Total T- 338\par GFR- 31\par Creatinine- 1.95\par AST- 22\par ALT- 20\par Hemoglobin- 16\par \par Previous Labs:\par Labs 05/2021\par Total T- 431\par Creatine- 1.96\par GFR- 31\par AST- 16\par ALT- 16 \par HGB- 16.2\par Hct- 49.3\par \par US Testicles 04/2021\par -Partially undescended right testicle, atrophic. No focal masses\par -S/p left orchiectomy \par \par Labs 1/7/2020\par -Total testosterone 475 (250-1100), bioavailable 143.5 (), SHBG 29 (22-77), Albumin 4.4 (3.6-5.1)\par \par June 2019\par US Doppler Scrotum\par right testis in inguinal canal -- no mass seen.\par Total T = 297, bioavail = 91\par \par US Duplex Scrotal - not done\par \par July 2020\par Bioavailable Testostertone Total; 157\par Complete Blood Count - hct 45\par Testosterone Free and Total; 503\par \par July 2020\par CT -- images visualized by me == IMPRESSION: \par 1. Numerous surgical clips surrounding the left kidney possibly accounting for the abnormal sonogram. Simple left renal cyst with no stone.\par 2. Significant prostate enlargement with bladder stone and bladder wall thickening consistent with outlet obstruction.\par 3. Status post lymphadenectomy with numerous retroperitoneal clips. No evidence of metastatic disease.

## 2021-11-29 NOTE — ASSESSMENT
[FreeTextEntry1] : Patient is an 86 year old male with history of Left Testicular cancer and R sided retractile testicle and low testosterone.\par I reviewed labs with patient. \par Patient is currently stable from prior visit. \par Reports good energy and weak urinary stream. He wants no treatment for urinary stream at this time.\par \par Plan\par -Testicular US 6 months\par -Follow up to review\par -Renew AndroGel [place] I-STOP  Reference #: [id number]\par -Follow up with Primary Medical Doctor for Renal Function.

## 2021-12-13 ENCOUNTER — NON-APPOINTMENT (OUTPATIENT)
Age: 86
End: 2021-12-13

## 2021-12-13 ENCOUNTER — APPOINTMENT (OUTPATIENT)
Dept: CARDIOLOGY | Facility: CLINIC | Age: 86
End: 2021-12-13
Payer: COMMERCIAL

## 2021-12-13 PROCEDURE — 93296 REM INTERROG EVL PM/IDS: CPT

## 2021-12-13 PROCEDURE — 93295 DEV INTERROG REMOTE 1/2/MLT: CPT

## 2022-01-12 NOTE — ED ADULT NURSE NOTE - CHIEF COMPLAINT
The patient is a 83y Male complaining of medical evaluation.
no fever/no chills/no sweating/no weight loss/no polyphagia/no polyuria/no polydipsia/no fatigue

## 2022-03-14 ENCOUNTER — NON-APPOINTMENT (OUTPATIENT)
Age: 87
End: 2022-03-14

## 2022-03-14 ENCOUNTER — OUTPATIENT (OUTPATIENT)
Dept: OUTPATIENT SERVICES | Facility: HOSPITAL | Age: 87
LOS: 1 days | Discharge: HOME | End: 2022-03-14
Payer: COMMERCIAL

## 2022-03-14 ENCOUNTER — APPOINTMENT (OUTPATIENT)
Dept: CARDIOLOGY | Facility: CLINIC | Age: 87
End: 2022-03-14
Payer: COMMERCIAL

## 2022-03-14 DIAGNOSIS — N20.0 CALCULUS OF KIDNEY: ICD-10-CM

## 2022-03-14 DIAGNOSIS — N28.9 DISORDER OF KIDNEY AND URETER, UNSPECIFIED: ICD-10-CM

## 2022-03-14 DIAGNOSIS — Z98.890 OTHER SPECIFIED POSTPROCEDURAL STATES: Chronic | ICD-10-CM

## 2022-03-14 PROCEDURE — 93296 REM INTERROG EVL PM/IDS: CPT

## 2022-03-14 PROCEDURE — 76770 US EXAM ABDO BACK WALL COMP: CPT | Mod: 26

## 2022-03-14 PROCEDURE — 93295 DEV INTERROG REMOTE 1/2/MLT: CPT

## 2022-06-01 ENCOUNTER — APPOINTMENT (OUTPATIENT)
Dept: UROLOGY | Facility: CLINIC | Age: 87
End: 2022-06-01
Payer: COMMERCIAL

## 2022-06-01 VITALS — HEIGHT: 70 IN | WEIGHT: 170 LBS | BODY MASS INDEX: 24.34 KG/M2

## 2022-06-01 PROCEDURE — 99214 OFFICE O/P EST MOD 30 MIN: CPT

## 2022-06-13 ENCOUNTER — APPOINTMENT (OUTPATIENT)
Dept: CARDIOLOGY | Facility: CLINIC | Age: 87
End: 2022-06-13
Payer: COMMERCIAL

## 2022-06-13 ENCOUNTER — NON-APPOINTMENT (OUTPATIENT)
Age: 87
End: 2022-06-13

## 2022-06-13 PROCEDURE — 93295 DEV INTERROG REMOTE 1/2/MLT: CPT

## 2022-06-13 PROCEDURE — 93296 REM INTERROG EVL PM/IDS: CPT

## 2022-06-29 NOTE — ASSESSMENT
[FreeTextEntry1] : Patient is an 86 year old male with history of Left Testicular cancer and R sided retractile testicle and low testosterone.\par I reviewed labs and ultrasound with patient. \par \par Patient is aware of PSA value.  PSA value of 6.7 is age-appropriate.  Patient is aware of risks of prostate cancer in setting of testosterone replacement therapy.  Patient verbalized understanding of risks and wants to continue testosterone replacement therapy.\par \par Patient is aware of bladder stone.  Patient is aware that bladder stones of a size that there is a risk of becoming caught in the urethra.  Patient is aware of enlarged prostate.  Discussed surgical procedure to remove bladder stone and to transurethrally resect the prostate.  Benefits and risks of procedure were reviewed with patient.  Patient is agreeable, however would like to schedule in October 2022 as [place] until then.  Patient is aware that if he is unable to urinate he must present to emergency room as he has a bladder stone which may make catheterization difficult.  Patient verbalized understanding.\par \par Patient is recommended to continue follow-up with primary medical doctor.\par \par I-STOP  Reference #: [id number]\par

## 2022-06-29 NOTE — HISTORY OF PRESENT ILLNESS
[FreeTextEntry1] : Patient is an 86 year old presents for 6 month follow up. Patient is followed for history of testicular cancer s/p orchiectomy in 1988 and retractile right testicle. \par Patient has been on Testosterone Replacement Therapy for Low T for many years managed on AndroGel 2 pumps daily. \par \par Patient presents to office for follow up. He states he feels well.  He reports good energy.  Patient states that he is following up with her primary medical doctor and had a kidney and bladder sonogram done March 14, 2022.  Impression of sonogram reveals a right bladder calculus measuring 1.4 cm without a right ureteral jet.  No hydronephrosis bilaterally.  Prostate volume of 106 cc.  Post void volume is approximately 1 cc.\par Patient denies any dysuria, gross hematuria.  Denies any difficulty urinating.\par \par Most recent labs from day of 2022.\par Hepatic function panel reveals a bilirubin direct 0.3.  Hemoglobin and hematocrit are within normal limit.  Platelet count 129.\par Total testosterone 544\par PSA of 6.7 with percent free 27%.\par \par PREVIOUS VISITS:\par Labs 11/2021\par Total T- 338\par GFR- 31\par Creatinine- 1.95\par AST- 22\par ALT- 20\par Hemoglobin- 16\par \par Labs 05/2021\par Total T- 431\par Creatine- 1.96\par GFR- 31\par AST- 16\par ALT- 16 \par HGB- 16.2\par Hct- 49.3\par \par US Testicles 04/2021\par -Partially undescended right testicle, atrophic. No focal masses\par -S/p left orchiectomy \par \par Labs 1/7/2020\par -Total testosterone 475 (250-1100), bioavailable 143.5 (), SHBG 29 (22-77), Albumin 4.4 (3.6-5.1)\par \par June 2019\par US Doppler Scrotum\par right testis in inguinal canal -- no mass seen.\par Total T = 297, bioavail = 91\par \par US Duplex Scrotal - not done\par \par July 2020\par Bioavailable Testostertone Total; 157\par Complete Blood Count - hct 45\par Testosterone Free and Total; 503\par \par July 2020\par CT -- images visualized by me == IMPRESSION: \par 1. Numerous surgical clips surrounding the left kidney possibly accounting for the abnormal sonogram. Simple left renal cyst with no stone.\par 2. Significant prostate enlargement with bladder stone and bladder wall thickening consistent with outlet obstruction.\par 3. Status post lymphadenectomy with numerous retroperitoneal clips. No evidence of metastatic disease.

## 2022-09-02 NOTE — PROGRESS NOTE ADULT - SUBJECTIVE AND OBJECTIVE BOX
Per Heike/Dr Rainey surgery cancelled and to be rescheduled after cardiac clearance received. Chart sent to medical records    24H events:  No events  No complaints    PAST MEDICAL & SURGICAL HISTORY  CKD (chronic kidney disease) stage 3, GFR 30-59 ml/min: baseline 1.8  Dyslipidemia  Chronic CHF (congestive heart failure)  H/O excision of testicular mass: left    SOCIAL HISTORY:  Negative for smoking/alcohol/drug use.     ALLERGIES:  Allergy Status Unknown    MEDICATIONS:  STANDING MEDICATIONS  heparin  Injectable 5000 Unit(s) SubCutaneous every 8 hours  losartan 100 milliGRAM(s) Oral daily  metoprolol tartrate 50 milliGRAM(s) Oral two times a day  simvastatin 20 milliGRAM(s) Oral at bedtime  spironolactone 25 milliGRAM(s) Oral <User Schedule>    PRN MEDICATIONS    VITALS:   T(F): 98.4  HR: 77  BP: 139/76  RR: 18  SpO2: 96%    LABS:                        15.4   6.97  )-----------( 144      ( 2019 06:24 )             48.3         146  |  106  |  27<H>  ----------------------------<  98  4.6   |  28  |  1.7<H>    Ca    9.5      2019 06:24  Mg     2.0         TPro  7.2  /  Alb  4.6  /  TBili  0.6  /  DBili  x   /  AST  24  /  ALT  18  /  AlkPhos  75      PT/INR - ( 2019 06:24 )   PT: 11.60 sec;   INR: 1.01 ratio         PTT - ( 2019 06:24 )  PTT:33.3 sec  Urinalysis Basic - ( 2019 19:00 )    Color: Yellow / Appearance: Clear / S.020 / pH: x  Gluc: x / Ketone: Negative  / Bili: Negative / Urobili: 1.0 mg/dL   Blood: x / Protein: Trace mg/dL / Nitrite: Negative   Leuk Esterase: Negative / RBC: x / WBC x   Sq Epi: x / Non Sq Epi: Occasional /HPF / Bacteria: Few /HPF        Troponin T, Serum: <0.01 ng/mL (19 @ 16:35)      CARDIAC MARKERS ( 2019 16:35 )  x     / <0.01 ng/mL / x     / x     / x          RADIOLOGY:    PHYSICAL EXAM:  GEN: No acute distress  LUNGS: Clear to auscultation bilaterally   HEART: S1/S2 present. RRR.   ABD: Soft, non-tender, non-distended.   Extrmities: No edema  NEURO: AAOX3

## 2022-09-04 ENCOUNTER — INPATIENT (INPATIENT)
Facility: HOSPITAL | Age: 87
LOS: 5 days | Discharge: HOME | End: 2022-09-10
Attending: COLON & RECTAL SURGERY | Admitting: COLON & RECTAL SURGERY

## 2022-09-04 VITALS
SYSTOLIC BLOOD PRESSURE: 155 MMHG | HEART RATE: 83 BPM | WEIGHT: 169.98 LBS | HEIGHT: 70 IN | DIASTOLIC BLOOD PRESSURE: 88 MMHG | OXYGEN SATURATION: 99 % | TEMPERATURE: 96 F | RESPIRATION RATE: 18 BRPM

## 2022-09-04 DIAGNOSIS — I50.9 HEART FAILURE, UNSPECIFIED: ICD-10-CM

## 2022-09-04 DIAGNOSIS — E78.5 HYPERLIPIDEMIA, UNSPECIFIED: ICD-10-CM

## 2022-09-04 DIAGNOSIS — N18.3 CHRONIC KIDNEY DISEASE, STAGE 3 (MODERATE): ICD-10-CM

## 2022-09-04 DIAGNOSIS — I10 ESSENTIAL (PRIMARY) HYPERTENSION: ICD-10-CM

## 2022-09-04 DIAGNOSIS — K56.601 COMPLETE INTESTINAL OBSTRUCTION, UNSPECIFIED AS TO CAUSE: ICD-10-CM

## 2022-09-04 DIAGNOSIS — Z98.890 OTHER SPECIFIED POSTPROCEDURAL STATES: Chronic | ICD-10-CM

## 2022-09-04 LAB
ALBUMIN SERPL ELPH-MCNC: 4.9 G/DL — SIGNIFICANT CHANGE UP (ref 3.5–5.2)
ALP SERPL-CCNC: 70 U/L — SIGNIFICANT CHANGE UP (ref 30–115)
ALT FLD-CCNC: 18 U/L — SIGNIFICANT CHANGE UP (ref 0–41)
ANION GAP SERPL CALC-SCNC: 17 MMOL/L — HIGH (ref 7–14)
APTT BLD: 32.9 SEC — SIGNIFICANT CHANGE UP (ref 27–39.2)
AST SERPL-CCNC: 27 U/L — SIGNIFICANT CHANGE UP (ref 0–41)
BASOPHILS # BLD AUTO: 0.04 K/UL — SIGNIFICANT CHANGE UP (ref 0–0.2)
BASOPHILS NFR BLD AUTO: 0.3 % — SIGNIFICANT CHANGE UP (ref 0–1)
BILIRUB DIRECT SERPL-MCNC: <0.2 MG/DL — SIGNIFICANT CHANGE UP (ref 0–0.3)
BILIRUB INDIRECT FLD-MCNC: >0.5 MG/DL — SIGNIFICANT CHANGE UP (ref 0.2–1.2)
BILIRUB SERPL-MCNC: 0.7 MG/DL — SIGNIFICANT CHANGE UP (ref 0.2–1.2)
BUN SERPL-MCNC: 47 MG/DL — HIGH (ref 10–20)
CALCIUM SERPL-MCNC: 9.8 MG/DL — SIGNIFICANT CHANGE UP (ref 8.5–10.1)
CHLORIDE SERPL-SCNC: 103 MMOL/L — SIGNIFICANT CHANGE UP (ref 98–110)
CO2 SERPL-SCNC: 21 MMOL/L — SIGNIFICANT CHANGE UP (ref 17–32)
CREAT SERPL-MCNC: 2.2 MG/DL — HIGH (ref 0.7–1.5)
EGFR: 29 ML/MIN/1.73M2 — LOW
EOSINOPHIL # BLD AUTO: 0.02 K/UL — SIGNIFICANT CHANGE UP (ref 0–0.7)
EOSINOPHIL NFR BLD AUTO: 0.1 % — SIGNIFICANT CHANGE UP (ref 0–8)
GLUCOSE SERPL-MCNC: 186 MG/DL — HIGH (ref 70–99)
HCT VFR BLD CALC: 50.8 % — SIGNIFICANT CHANGE UP (ref 42–52)
HCT VFR BLD CALC: 52.4 % — HIGH (ref 42–52)
HGB BLD-MCNC: 17.1 G/DL — SIGNIFICANT CHANGE UP (ref 14–18)
HGB BLD-MCNC: 17.7 G/DL — SIGNIFICANT CHANGE UP (ref 14–18)
IMM GRANULOCYTES NFR BLD AUTO: 0.6 % — HIGH (ref 0.1–0.3)
INR BLD: 0.98 RATIO — SIGNIFICANT CHANGE UP (ref 0.65–1.3)
LACTATE SERPL-SCNC: 3.5 MMOL/L — HIGH (ref 0.7–2)
LACTATE SERPL-SCNC: 3.7 MMOL/L — HIGH (ref 0.7–2)
LIDOCAIN IGE QN: 35 U/L — SIGNIFICANT CHANGE UP (ref 7–60)
LYMPHOCYTES # BLD AUTO: 13.8 % — LOW (ref 20.5–51.1)
LYMPHOCYTES # BLD AUTO: 2.17 K/UL — SIGNIFICANT CHANGE UP (ref 1.2–3.4)
MAGNESIUM SERPL-MCNC: 1.8 MG/DL — SIGNIFICANT CHANGE UP (ref 1.8–2.4)
MCHC RBC-ENTMCNC: 32.9 PG — HIGH (ref 27–31)
MCHC RBC-ENTMCNC: 33.1 PG — HIGH (ref 27–31)
MCHC RBC-ENTMCNC: 33.7 G/DL — SIGNIFICANT CHANGE UP (ref 32–37)
MCHC RBC-ENTMCNC: 33.8 G/DL — SIGNIFICANT CHANGE UP (ref 32–37)
MCV RBC AUTO: 97.4 FL — HIGH (ref 80–94)
MCV RBC AUTO: 98.4 FL — HIGH (ref 80–94)
MONOCYTES # BLD AUTO: 0.77 K/UL — HIGH (ref 0.1–0.6)
MONOCYTES NFR BLD AUTO: 4.9 % — SIGNIFICANT CHANGE UP (ref 1.7–9.3)
NEUTROPHILS # BLD AUTO: 12.65 K/UL — HIGH (ref 1.4–6.5)
NEUTROPHILS NFR BLD AUTO: 80.3 % — HIGH (ref 42.2–75.2)
NRBC # BLD: 0 /100 WBCS — SIGNIFICANT CHANGE UP (ref 0–0)
PHOSPHATE SERPL-MCNC: 2.8 MG/DL — SIGNIFICANT CHANGE UP (ref 2.1–4.9)
PLATELET # BLD AUTO: 143 K/UL — SIGNIFICANT CHANGE UP (ref 130–400)
PLATELET # BLD AUTO: 149 K/UL — SIGNIFICANT CHANGE UP (ref 130–400)
POTASSIUM SERPL-MCNC: 4.4 MMOL/L — SIGNIFICANT CHANGE UP (ref 3.5–5)
POTASSIUM SERPL-SCNC: 4.4 MMOL/L — SIGNIFICANT CHANGE UP (ref 3.5–5)
PROT SERPL-MCNC: 7 G/DL — SIGNIFICANT CHANGE UP (ref 6–8)
PROTHROM AB SERPL-ACNC: 11.3 SEC — SIGNIFICANT CHANGE UP (ref 9.95–12.87)
RBC # BLD: 5.16 M/UL — SIGNIFICANT CHANGE UP (ref 4.7–6.1)
RBC # BLD: 5.38 M/UL — SIGNIFICANT CHANGE UP (ref 4.7–6.1)
RBC # FLD: 12.5 % — SIGNIFICANT CHANGE UP (ref 11.5–14.5)
RBC # FLD: 12.9 % — SIGNIFICANT CHANGE UP (ref 11.5–14.5)
SARS-COV-2 RNA SPEC QL NAA+PROBE: SIGNIFICANT CHANGE UP
SODIUM SERPL-SCNC: 141 MMOL/L — SIGNIFICANT CHANGE UP (ref 135–146)
TROPONIN T SERPL-MCNC: <0.01 NG/ML — SIGNIFICANT CHANGE UP
WBC # BLD: 14.26 K/UL — HIGH (ref 4.8–10.8)
WBC # BLD: 15.74 K/UL — HIGH (ref 4.8–10.8)
WBC # FLD AUTO: 14.26 K/UL — HIGH (ref 4.8–10.8)
WBC # FLD AUTO: 15.74 K/UL — HIGH (ref 4.8–10.8)

## 2022-09-04 PROCEDURE — 99291 CRITICAL CARE FIRST HOUR: CPT | Mod: 24,25

## 2022-09-04 PROCEDURE — 99285 EMERGENCY DEPT VISIT HI MDM: CPT

## 2022-09-04 PROCEDURE — 99223 1ST HOSP IP/OBS HIGH 75: CPT

## 2022-09-04 PROCEDURE — 93010 ELECTROCARDIOGRAM REPORT: CPT

## 2022-09-04 PROCEDURE — 74177 CT ABD & PELVIS W/CONTRAST: CPT | Mod: 26,MA

## 2022-09-04 PROCEDURE — 71045 X-RAY EXAM CHEST 1 VIEW: CPT | Mod: 26

## 2022-09-04 RX ORDER — ONDANSETRON 8 MG/1
4 TABLET, FILM COATED ORAL ONCE
Refills: 0 | Status: COMPLETED | OUTPATIENT
Start: 2022-09-04 | End: 2022-09-04

## 2022-09-04 RX ORDER — ACETAMINOPHEN 500 MG
1000 TABLET ORAL ONCE
Refills: 0 | Status: COMPLETED | OUTPATIENT
Start: 2022-09-04 | End: 2022-09-06

## 2022-09-04 RX ORDER — SODIUM CHLORIDE 9 MG/ML
1000 INJECTION, SOLUTION INTRAVENOUS
Refills: 0 | Status: DISCONTINUED | OUTPATIENT
Start: 2022-09-04 | End: 2022-09-05

## 2022-09-04 RX ORDER — MORPHINE SULFATE 50 MG/1
6 CAPSULE, EXTENDED RELEASE ORAL ONCE
Refills: 0 | Status: DISCONTINUED | OUTPATIENT
Start: 2022-09-04 | End: 2022-09-04

## 2022-09-04 RX ORDER — METOPROLOL TARTRATE 50 MG
100 TABLET ORAL
Refills: 0 | Status: DISCONTINUED | OUTPATIENT
Start: 2022-09-04 | End: 2022-09-05

## 2022-09-04 RX ORDER — SODIUM CHLORIDE 9 MG/ML
500 INJECTION INTRAMUSCULAR; INTRAVENOUS; SUBCUTANEOUS ONCE
Refills: 0 | Status: COMPLETED | OUTPATIENT
Start: 2022-09-04 | End: 2022-09-04

## 2022-09-04 RX ORDER — LOSARTAN POTASSIUM 100 MG/1
100 TABLET, FILM COATED ORAL DAILY
Refills: 0 | Status: DISCONTINUED | OUTPATIENT
Start: 2022-09-04 | End: 2022-09-04

## 2022-09-04 RX ORDER — SIMVASTATIN 20 MG/1
20 TABLET, FILM COATED ORAL AT BEDTIME
Refills: 0 | Status: DISCONTINUED | OUTPATIENT
Start: 2022-09-04 | End: 2022-09-05

## 2022-09-04 RX ORDER — MORPHINE SULFATE 50 MG/1
4 CAPSULE, EXTENDED RELEASE ORAL ONCE
Refills: 0 | Status: DISCONTINUED | OUTPATIENT
Start: 2022-09-04 | End: 2022-09-04

## 2022-09-04 RX ORDER — CHLORHEXIDINE GLUCONATE 213 G/1000ML
1 SOLUTION TOPICAL
Refills: 0 | Status: DISCONTINUED | OUTPATIENT
Start: 2022-09-04 | End: 2022-09-06

## 2022-09-04 RX ORDER — METOPROLOL TARTRATE 50 MG
1 TABLET ORAL
Qty: 0 | Refills: 0 | DISCHARGE

## 2022-09-04 RX ORDER — HYDROMORPHONE HYDROCHLORIDE 2 MG/ML
0.25 INJECTION INTRAMUSCULAR; INTRAVENOUS; SUBCUTANEOUS EVERY 4 HOURS
Refills: 0 | Status: DISCONTINUED | OUTPATIENT
Start: 2022-09-04 | End: 2022-09-05

## 2022-09-04 RX ADMIN — MORPHINE SULFATE 6 MILLIGRAM(S): 50 CAPSULE, EXTENDED RELEASE ORAL at 12:34

## 2022-09-04 RX ADMIN — MORPHINE SULFATE 6 MILLIGRAM(S): 50 CAPSULE, EXTENDED RELEASE ORAL at 17:35

## 2022-09-04 RX ADMIN — SODIUM CHLORIDE 1000 MILLILITER(S): 9 INJECTION INTRAMUSCULAR; INTRAVENOUS; SUBCUTANEOUS at 12:33

## 2022-09-04 RX ADMIN — SODIUM CHLORIDE 500 MILLILITER(S): 9 INJECTION INTRAMUSCULAR; INTRAVENOUS; SUBCUTANEOUS at 17:35

## 2022-09-04 RX ADMIN — SODIUM CHLORIDE 100 MILLILITER(S): 9 INJECTION, SOLUTION INTRAVENOUS at 23:23

## 2022-09-04 RX ADMIN — MORPHINE SULFATE 4 MILLIGRAM(S): 50 CAPSULE, EXTENDED RELEASE ORAL at 11:56

## 2022-09-04 RX ADMIN — ONDANSETRON 4 MILLIGRAM(S): 8 TABLET, FILM COATED ORAL at 11:55

## 2022-09-04 NOTE — H&P ADULT - HISTORY OF PRESENT ILLNESS
Patient is a 87 year old male with past medical history of CHF, AICD & PPM due to CHF, HTN, CKD stage III (baseline Creatinine of 1.8), new diagnosis of bladder obstruction due to mass and remote history of testicular cancer (s/p removal of B/L testicles and left lymph node excision in 1988) who now presents in the ER with a one day history of lower abdominal pain, Left > Right .   Denies radiation of the pain.     Patient reports the pain started yesterday.  Denies prior episodes of bowel obstructions.    Reports the pain started as mild then progressed to severe 8/10 on pain scale today so came to the ER for an evaluation.  Pain characterized as sharp and crampy.     Patient reports associated non bloody, non bilious vomiting and urinary hesitancy and decreased flow.  Patient denies fever, chills, tremors, diarrhea, constipation, chest pains or SOB.   Patient reports his last colonoscopy was about 10 years ago by Dr. Delgado.    Patient states has moderate pain after given dose of Morphine a few hours ago.    Patient's PCP is Dr. To Lopez ([facility]).  GI:  Dr. Delgado  Cardiologist:  Dr. Marrero  Urologist:  Dr. Garces

## 2022-09-04 NOTE — H&P ADULT - NSHPOUTPATIENTPROVIDERS_GEN_ALL_CORE
PCP:  Dr. To Lopez ([facility] on [address])  Cardiologist:  Dr. Grover  GI:  Dr. Box  Urologist:  Dr. Garces

## 2022-09-04 NOTE — ED PROVIDER NOTE - NSRISKOFTRANSFER_ED_A_ED
Deterioration of Condition En Route/Transportation Risk (There is always a risk of traffic delays resulting in deterioration of condition.) Transportation Risk (There is always a risk of traffic delays resulting in deterioration of condition.)

## 2022-09-04 NOTE — ED PROVIDER NOTE - OBJECTIVE STATEMENT
86 yo male, pmh of chf s/p aicd/ppm, htn hld, ckd, psh of testicular ca s/p mass excision and abd lymph node removal, presents to ed for abd pain, llq, mild, aching, no radiation, started today a/w nv. Denies fever, chills, cp, sob, diarrhea, dysuria, hematuria.

## 2022-09-04 NOTE — PATIENT PROFILE ADULT - FALL HARM RISK - HARM RISK INTERVENTIONS

## 2022-09-04 NOTE — ED PROVIDER NOTE - PHYSICAL EXAMINATION
Physical Exam    Vital Signs: I have reviewed the initial vital signs.  Constitutional: appears stated age, no acute distress  Eyes: Conjunctiva pink, Sclera clear  Cardiovascular: S1 and S2, regular rate, regular rhythm, well-perfused extremities, radial pulses equal and 2+, pedal pulses 2+ and equal  Respiratory: unlabored respiratory effort, clear to auscultation bilaterally no wheezing, rales and rhonchi  Gastrointestinal: soft, + lower abd ttp, no pulsatile mass, normal bowl sounds  Musculoskeletal: supple neck, no lower extremity edema, no midline tenderness  Integumentary: warm, dry, no rash  Neurologic: awake, alert, nvi

## 2022-09-04 NOTE — H&P ADULT - NSHPLABSRESULTS_GEN_ALL_CORE
.                          17.1   15.74 )-----------( 149      ( 04 Sep 2022 11:45 )             50.8       09-04    141  |  103  |  47<H>  ----------------------------<  186<H>  4.4   |  21  |  2.2<H>    Ca    9.8      04 Sep 2022 11:45    TPro  7.0  /  Alb  4.9  /  TBili  0.7  /  DBili  <0.2  /  AST  27  /  ALT  18  /  AlkPhos  70  09-04            Lactate Trend  09-04 @ 12:26 Lactate:3.7       CARDIAC MARKERS ( 04 Sep 2022 12:26 )  x     / <0.01 ng/mL / x     / x     / x                CT Abdomen and Pelvis w/ IV Cont (09.04.22 @ 14:35)                        PROCEDURE DATE:  09/04/2022      FINDINGS:    LOWER CHEST: There is mild bibasilar subsegmental atelectasis.  HEPATOBILIARY: No focal liver lesion.  No CT evidence of acute   gallbladder pathology.  SPLEEN: Unremarkable.  PANCREAS: Unremarkable.  ADRENAL GLANDS: No acute pathology..  KIDNEYS: There is cortical atrophy of the medial aspect of the left   kidney, renal nephrograms are otherwise symmetric. No hydronephrosis.   There are bilateral renal cysts and hypodensities too small to further   characterize.  ABDOMINOPELVIC NODES: Status post retroperitoneal dissection with   numerous surgical clips and resulting artifact. Similar appearing   prominent lymph nodes adjacent iliac chain, measuring up to 1 cm in the   short axis on the right (4-282).  PELVIC ORGANS: Prostatmegaly. Large bladder calculus measuring up to 1.3   cm.  PERITONEUM/MESENTERY/BOWEL: Closed loop small bowel obstruction. Dilated   fluid-filled loops of small bowel with interloop ascites. Proximal   transition point (4-191), distal transition (4-174), both within the left   hemiabdomen. Of note, a collapsed loop of distal transverse colon also   appears adherent to the point of rotation (4-163 through 4-174). Small   volume abdominopelvic ascites. No intraperitoneal free air. Normal   caliber appendix.   BONES/SOFT TISSUES: No definite acute osseous abnormality. Degenerative   changes of the visualized spine, increased as compared with prior.  VASCULAR: The abdominal aorta is of normal caliber..  OTHER: Partially imaged intracardiac leads.        IMPRESSION:    Findings compatible with closed-loop small bowel obstruction with   interloop ascites as well as associated small volume abdominopelvic   ascites.  A loop of distal transverse colon also appears adherent to the point of   rotation, as above. It is unclear if this represents a developing   associated large bowel obstruction.

## 2022-09-04 NOTE — ED PROVIDER NOTE - ATTENDING APP SHARED VISIT CONTRIBUTION OF CARE
87y male above PMH with abd pain with nausea, 1 episode nbnb vomiting, last BM this am, small, no fever, on exam vital signs appreciated, elderly and nontoxic but in pain, abd with dim bs, soft with mild lower abd ttp no g/r, labs and studies reviewed and d/w surgery, will transfer [place], Dr Castaneda accepts

## 2022-09-04 NOTE — H&P ADULT - NS ATTEND AMEND GEN_ALL_CORE FT
Patient is an 87M with PMH of CHF, AICD, PPM, HTN, CKD III, testicular cancer s/p resection and lymph node dissection in 1988 presents with one day of abdominal pain.  Found on CT to have SBO with possible closed loop.    Patient seen and examined.  Patient reports dull ache in his abdomen improve from presentation.  Denies flatus BM    Abdomen - soft, non distended, non tender.  No rebound or guarding    Vitals labs and images reviewed    Plan  - NPO  - IVF  - NGT  - trend lactate  - cardiac evaluation  - serial abdominal exams.  - I had a long conversation with the patient and his wife.  I do not believe he has a closed loop obstruction given his improving condition with hydration. We discussed that we will attempt NGT decompression and that he may require surgery to relieve his obstruction.  We discussed that he is high risk for surgery.

## 2022-09-04 NOTE — ED PROVIDER NOTE - CLINICAL SUMMARY MEDICAL DECISION MAKING FREE TEXT BOX
Patient presented with abdominal pain associated with nausea and vomiting.  Initially seen at [facility] ER which time labs and CT were performed ultimately showing a closed-loop obstruction with what appears to also be a developing large bowel obstruction.  Patient was transferred to [place] as per surgery recommendations.  On arrival to [facility], patient afebrile, hemodynamically stable.  NG tube was placed and surgery was consulted.  Surgery evaluated patient in the ED and ultimately recommended admission to SICU for further management.  Patient and family agreeable with plan.  Hemodynamically stable at time of admission.

## 2022-09-04 NOTE — ED ADULT TRIAGE NOTE - NS ED NURSE DIRECT TO ROOM YN
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    Anesthesia Type:   General     Note:    Oropharynx: spontaneously breathing  Level of Consciousness: drowsy  Oxygen Supplementation: face mask  Level of Supplemental Oxygen (L/min / FiO2): 6  Independent Airway: airway patency satisfactory and stable  Dentition: dentition unchanged  Vital Signs Stable: post-procedure vital signs reviewed and stable  Report to RN Given: handoff report given  Patient transferred to: PACU    Handoff Report: Identifed the Patient, Identified the Reponsible Provider, Reviewed the pertinent medical history, Discussed the surgical course, Reviewed Intra-OP anesthesia mangement and issues during anesthesia, Set expectations for post-procedure period and Allowed opportunity for questions and acknowledgement of understanding      Vitals:  Vitals Value Taken Time   /96 08/11/21 1039   Temp     Pulse 84 08/11/21 1042   Resp 11 08/11/21 1042   SpO2 97 % 08/11/21 1042   Vitals shown include unvalidated device data.    Electronically Signed By: MANN Estrada CRNA  August 11, 2021  10:42 AM   Yes

## 2022-09-04 NOTE — CONSULT NOTE ADULT - ASSESSMENT
Assessment & Plan    87y Male w/ Closed loop bowel obstruction     NEURO:  Acute pain-controlled with     RESP:     Oxygen insufficiency-wean off NC to RA as tolerate    Activity-          Current Rx: ALBUTerol    0.083%. 2.5 once      Home Rx:       CARDS:     Imaging:     Labs:   metoprolol tartrate 100 two times a day      GI/NUTR:         GI Prophylaxis-    Bowel regimen-  pantoprazole  Injectable 40 milliGRAM(s) IV Push daily      /RENAL:        Monitor UO-moses in place    Current Rx:     Labs:          BUN/Cr- 47/2.2  -->,  49/2.2  -->          Electrolytes-Na 143 // K 6.1 // Mg 1.8 //  Phos 2.8 (09-04 @ 23:13)      Home Rx:       HEME/ONC:       DVT prophylaxis-heparin   Injectable  , SCDs    Labs: Hb/Hct:  17.1/50.8  -->,  17.7/52.4  -->                      Plts:  149  -->,  143  -->                 PTT/INR:  32.9/0.98  --->       Home Rx:   T&S Expires:     ID:  WBC- 15.74  --->>,  14.26  --->>  Temp trend- 24hrs T(F): 97.8 (09-04 @ 23:25), Max: 97.8 (09-04 @ 23:25)  Antibiotics-  Cultures:           ENDO:    Glucose Glucose, Serum: 146 (09-04 @ 23:13)      HA1C     LINES/DRAINS:  PIV, Mirella, Moses     DISPO:    SICU Assessment & Plan    87y Male w/ Closed loop bowel obstruction     NEURO:  Acute pain-controlled with IV Tylenol, Dilaudid PRN    RESP:   - Saturating well on 2L NC, wean to RA as tolerated  - Encourage IS  - AM CXR  - Activity- Ambulate as tolerated        CARDS:   #Hx HFrEF (15%) w/ AICD and PPM  - Continue Metoprolol 12.5 q12 (takes 100 BID at home)  - Holding home Lasix 40 PRN and Spironolactone 25mg MWF  #Hx HTN  - Holding home losartan 100 daily  #Hx HLD  -Continue home Simvastatin 20 daily  - f/u repeat ECHO  - f/u EKG  - CE negative x1, f/u repeat      GI/NUTR:   Closed loop bowel obstruction  - Diet: NPO  - GI Prophylaxis- PPI  - Bowel regimen- none  - NGT to LCWS        /RENAL:   - Monitor UO-moses in place  - LR @100    Labs:          BUN/Cr- 47/2.2  -->,  49/2.2  -->          Electrolytes-Na 143 // K 6.1 // Mg 1.8 //  Phos 2.8 (09-04 @ 23:13)  #Hyperkalemia  - Treated with Ca, Dextrose, and Insulin  - f/u repeat BMP  #CKD stage 3 (baseline Cr 1.8)  #Testicular CA s/p orchiectomy  - Holding home Testosterone 20.25 MG/ACT (1.62%) Transdermal Gel (AndroGel Pump); APPLY TWO PUMP PRESSES ONE TIME DAILY AS DIRECTED MDD:2 pumps        HEME/ONC:   - DVT prophylaxis-heparin   Injectable, SCDs    Labs: Hb/Hct:  17.1/50.8  -->,  17.7/52.4  -->                      Plts:  149  -->,  143  -->                 PTT/INR:  32.9/0.98  --->         ID:  WBC- 15.74  --->>,  14.26  --->>  Temp trend- 24hrs T(F): 97.8 (09-04 @ 23:25), Max: 97.8 (09-04 @ 23:25)  Antibiotics- none           ENDO:  No chronic Hx  - Glucose Glucose, Serum: 146 (09-04 @ 23:13)  - FS q4 while NPO  - f/u HA1C     LINES/DRAINS:  PIV, Moses     DISPO:    SICU    Discussed with Dr. Smith Assessment & Plan    87y Male w/ Closed loop bowel obstruction     NEURO:  Acute pain-controlled with IV Tylenol, Dilaudid PRN    RESP:   - Saturating well on 2L NC, wean to RA as tolerated  - Encourage IS  - AM CXR  - Activity- Ambulate as tolerated        CARDS:   #Hx HFrEF (15%) w/ AICD and PPM  - Continue Metoprolol 12.5 q12 (takes 100 BID at home)  - Holding home Lasix 40 PRN and Spironolactone 25mg MWF  #Hx HTN  - Holding home losartan 100 daily  #Hx HLD  -Continue home Simvastatin 20 daily  #Lactate  - Lactate 3.7 on admission, repeat 3.5 on IVF, f/u repeat in AM  - f/u repeat ECHO  - f/u EKG  - CE negative x1, f/u repeat      GI/NUTR:   Closed loop bowel obstruction  - Diet: NPO  - GI Prophylaxis- PPI  - Bowel regimen- none  - NGT to LCWS        /RENAL:   - Monitor UO-moses in place  - LR @100    Labs:          BUN/Cr- 47/2.2  -->,  49/2.2  -->          Electrolytes-Na 143 // K 6.1 // Mg 1.8 //  Phos 2.8 (09-04 @ 23:13)  #Hyperkalemia  - Treated with Ca, Dextrose, and Insulin  - f/u repeat BMP  #CKD stage 3 (baseline Cr 1.8)  #Testicular CA s/p orchiectomy  - Holding home Testosterone 20.25 MG/ACT (1.62%) Transdermal Gel (AndroGel Pump); APPLY TWO PUMP PRESSES ONE TIME DAILY AS DIRECTED MDD:2 pumps        HEME/ONC:   - DVT prophylaxis-heparin   Injectable, SCDs    Labs: Hb/Hct:  17.1/50.8  -->,  17.7/52.4  -->                      Plts:  149  -->,  143  -->                 PTT/INR:  32.9/0.98  --->         ID:  WBC- 15.74  --->>,  14.26  --->>  Temp trend- 24hrs T(F): 97.8 (09-04 @ 23:25), Max: 97.8 (09-04 @ 23:25)  Antibiotics- none           ENDO:  No chronic Hx  - Glucose Glucose, Serum: 146 (09-04 @ 23:13)  - FS q4 while NPO  - f/u HA1C     LINES/DRAINS:  Gera MARRERO     DISPO:    HAJAU    Discussed with Dr. Smith

## 2022-09-04 NOTE — ED ADULT NURSE REASSESSMENT NOTE - NS ED NURSE REASSESS COMMENT FT1
pt received from [facility], NG tube placed on arrival in critical care area. pt with no complaints. pain managed prior to transfer. pt stable.

## 2022-09-04 NOTE — CONSULT NOTE ADULT - SUBJECTIVE AND OBJECTIVE BOX
Patient seen and examined at 20:30, accepted to ICU full note to follow Patient seen and examined at 20:30, accepted to ICU full note to follow      SICU Consultation Note  =====================================================  HPI: 87y Male w/ PMH HFrEF (15% s/p AICD & PPM), HTN, CKD stage 3 (baseline Cr 1.8), remote hx testicular CA s/p removal of B/L testicles and left lymph node excision in 1988, new renal calculus presents w/ Closed loop bowel obstruction. Patient presented to the ED with a one day history of lower abdominal pain L>R. Patient states pain strted mildly and then progressed to severe pain 8/10 which propmpted visit to the ER. pain was sharp and crampy associated with an episode of non-bloody, non-bilious vomiting today. Patient reports last BM on day of admission. CT scan was done showing closed loop bowel obstruction. Discussion was had between surgery and family of risks and benefits of surgery at this time. Due to high risk for procedure plan is non operative management at this time. SICU consulted for close monitoring. on SICU examination patient pain is improved s/p morphine and NGT placement. Discussed with patient and son and patient wished to remain full code.     < from: CT Abdomen and Pelvis w/ IV Cont (09.04.22 @ 14:35) >  IMPRESSION:    Findings compatible with closed-loop small bowel obstruction with   interloop ascites as well as associated small volume abdominopelvic   ascites.    A loop of distal transverse colon also appears adherent to the point of   rotation, as above. It is unclear if this represents a developing   associated large bowel obstruction.      < end of copied text >      Patient's PCP is Dr. To Lopez ([facility] on [place]).  GI:  Dr. Delgado  Cardiologist:  Dr. Marrero  Urologist:  Dr. Garces (04 Sep 2022 18:52)        PAST MEDICAL & SURGICAL HISTORY:  Chronic CHF (congestive heart failure)  (EF=15)      Dyslipidemia      CKD (chronic kidney disease) stage 3, GFR 30-59 ml/min  (baseline Creat=1.8)      HTN (hypertension)      H/O testicular cancer  1988      S/P implantation of automatic cardioverter/defibrillator (AICD)      History of pacemaker      Dyslipidemia      H/O excision of testicular mass  (Bilateral with LN excision in 1988)        Home Meds: Home Medications:  AndroGel Pump 1.25 g/actuation:  (04 Sep 2022 12:07)  Lasix 40 mg oral tablet: 1 tab(s) orally once a day, As Needed (04 Sep 2022 12:07)  losartan 100 mg oral tablet: 1 tab(s) orally once a day (04 Sep 2022 12:07)  Metoprolol Tartrate 100 mg oral tablet: 1 tab(s) orally 2 times a day (04 Sep 2022 12:07)  simvastatin 20 mg oral tablet: 1 tab(s) orally once a day (at bedtime) (04 Sep 2022 12:07)  spironolactone 25 mg oral tablet: 1 tab(s) orally Monday, Wednesday, and Friday (04 Sep 2022 12:07)    Allergies: Allergies    No Known Allergies    Intolerances      Soc:   Advanced Directives: Presumed Full Code     ROS:    REVIEW OF SYSTEMS    [x] A ten-point review of systems was otherwise negative except as noted.  [ ] Due to altered mental status/intubation, subjective information were not able to be obtained from the patient. History was obtained, to the extent possible, from review of the chart and collateral sources of information.      CURRENT MEDICATIONS:   --------------------------------------------------------------------------------------  Neurologic Medications  acetaminophen   IVPB .. 1000 milliGRAM(s) IV Intermittent once PRN Mild Pain (1 - 3)  HYDROmorphone  Injectable 0.25 milliGRAM(s) IV Push every 4 hours PRN Severe Pain (7 - 10)    Respiratory Medications    Cardiovascular Medications  metoprolol tartrate 100 milliGRAM(s) Oral two times a day    Gastrointestinal Medications  lactated ringers. 1000 milliLiter(s) IV Continuous <Continuous>    Genitourinary Medications    Hematologic/Oncologic Medications    Antimicrobial/Immunologic Medications    Endocrine/Metabolic Medications  simvastatin 20 milliGRAM(s) Oral at bedtime    Topical/Other Medications  chlorhexidine 2% Cloths 1 Application(s) Topical <User Schedule>    --------------------------------------------------------------------------------------    VITAL SIGNS, INS/OUTS (last 24 hours):  --------------------------------------------------------------------------------------  ICU Vital Signs Last 24 Hrs  T(C): 36.1 (04 Sep 2022 19:16), Max: 36.2 (04 Sep 2022 17:06)  T(F): 97 (04 Sep 2022 19:16), Max: 97.2 (04 Sep 2022 17:06)  HR: 109 (04 Sep 2022 19:26) (83 - 109)  BP: 116/64 (04 Sep 2022 19:26) (111/67 - 155/88)  BP(mean): --  ABP: --  ABP(mean): --  RR: 20 (04 Sep 2022 19:26) (18 - 20)  SpO2: 98% (04 Sep 2022 19:26) (95% - 99%)    O2 Parameters below as of 04 Sep 2022 19:26  Patient On (Oxygen Delivery Method): room air          I&O's Summary    --------------------------------------------------------------------------------------    EXAM:  General/Neuro:   Exam: Normal, NAD, alert, oriented x 3, no focal deficits. LIN, strength intact b/l UE and LE    Respiratory  Exam: Lungs clear to auscultation, Normal expansion/effort.    Saturating well on RA    Cardiovascular  Exam: S1, S2.  Regular rate and rhythm. - Peripheral edema   Cardiac Rhythm: Normal Sinus Rhythm    GI  Exam: Abdomen soft, Non-tender, Non-distended.  Nasogastric tube in place.    Current Diet:  NPO          Extremities  Exam: Extremities warm, pink, well-perfused.        Derm:  Exam: Good skin turgor, no skin breakdown.      :   Exam: Boss catheter in place.     Tubes/Lines/Drains  ***  [x] Peripheral IV  [] Central Venous Line     	[] R	[] L	[] IJ	[] Fem	[] SC        Type:	    Date Placed:   [] Arterial Line		[] R	[] L	[] Fem	[] Rad	[] Ax	Date Placed:   [] PICC:         	[] Midline		[] Mediport           [] Urinary Catheter		Date Placed:     LABS  --------------------------------------------------------------------------------------  Labs:  CAPILLARY BLOOD GLUCOSE                              17.1   15.74 )-----------( 149      ( 04 Sep 2022 11:45 )             50.8       Auto Neutrophil %: 80.3 % (09-04-22 @ 11:45)  Auto Immature Granulocyte %: 0.6 % (09-04-22 @ 11:45)    09-04    141  |  103  |  47<H>  ----------------------------<  186<H>  4.4   |  21  |  2.2<H>      Calcium, Total Serum: 9.8 mg/dL (09-04-22 @ 11:45)      LFTs:             7.0  | 0.7  | 27       ------------------[70      ( 04 Sep 2022 11:45 )  4.9  | <0.2 | 18          Lipase:35     Amylase:x         Lactate, Blood: 3.7 mmol/L (09-04-22 @ 12:26)      Coags:    CARDIAC MARKERS ( 04 Sep 2022 12:26 )  x     / <0.01 ng/mL / x     / x     / x                    --------------------------------------------------------------------------------------    OTHER LABS    IMAGING RESULTS      ASSESSMENT:  87y Male ***    PLAN:   Neurologic:   Respiratory:   Cardiovascular:   Gastrointestinal/Nutrition:   Renal/Genitourinary:   Hematologic:   Infectious Disease:   Lines/Tubes:  Endocrine:   Disposition:     --------------------------------------------------------------------------------------    Critical Care Diagnoses:   Patient seen and examined at 20:30, accepted to ICU full note to follow      SICU Consultation Note  =====================================================  HPI: 87y Male w/ PMH HFrEF (15% s/p AICD & PPM), HTN, CKD stage 3 (baseline Cr 1.8), remote hx testicular CA s/p removal of B/L testicles and left lymph node excision in 1988, new renal calculus presents w/ Closed loop bowel obstruction. Patient presented to the ED with a one day history of lower abdominal pain L>R. Patient states pain strted mildly and then progressed to severe pain 8/10 which propmpted visit to the ER. pain was sharp and crampy associated with an episode of non-bloody, non-bilious vomiting today. Patient reports last BM on day of admission. CT scan was done showing closed loop bowel obstruction. Lactate on admission 3.7. Discussion was had between surgery and family of risks and benefits of surgery at this time. Due to high risk for procedure plan is non operative management at this time. SICU consulted for close monitoring. On SICU examination patient pain is improved s/p morphine and NGT placement. Discussed with patient and son and patient wished to remain full code.     < from: CT Abdomen and Pelvis w/ IV Cont (09.04.22 @ 14:35) >  IMPRESSION:    Findings compatible with closed-loop small bowel obstruction with   interloop ascites as well as associated small volume abdominopelvic   ascites.    A loop of distal transverse colon also appears adherent to the point of   rotation, as above. It is unclear if this represents a developing   associated large bowel obstruction.      < end of copied text >      Patient's PCP is Dr. To Lopez ([facility]).  GI:  Dr. Delgado  Cardiologist:  Dr. Marrero  Urologist:  Dr. Garces (04 Sep 2022 18:52)        PAST MEDICAL & SURGICAL HISTORY:  Chronic CHF (congestive heart failure)  (EF=15)      Dyslipidemia      CKD (chronic kidney disease) stage 3, GFR 30-59 ml/min  (baseline Creat=1.8)      HTN (hypertension)      H/O testicular cancer  1988      S/P implantation of automatic cardioverter/defibrillator (AICD)      History of pacemaker      Dyslipidemia      H/O excision of testicular mass  (Bilateral with LN excision in 1988)        Home Meds: Home Medications:  AndroGel Pump 1.25 g/actuation:  (04 Sep 2022 12:07)  Lasix 40 mg oral tablet: 1 tab(s) orally once a day, As Needed (04 Sep 2022 12:07)  losartan 100 mg oral tablet: 1 tab(s) orally once a day (04 Sep 2022 12:07)  Metoprolol Tartrate 100 mg oral tablet: 1 tab(s) orally 2 times a day (04 Sep 2022 12:07)  simvastatin 20 mg oral tablet: 1 tab(s) orally once a day (at bedtime) (04 Sep 2022 12:07)  spironolactone 25 mg oral tablet: 1 tab(s) orally Monday, Wednesday, and Friday (04 Sep 2022 12:07)    Allergies: Allergies    No Known Allergies    Intolerances      Soc:   Advanced Directives: Presumed Full Code     ROS:    REVIEW OF SYSTEMS    [x] A ten-point review of systems was otherwise negative except as noted.  [ ] Due to altered mental status/intubation, subjective information were not able to be obtained from the patient. History was obtained, to the extent possible, from review of the chart and collateral sources of information.      CURRENT MEDICATIONS:   --------------------------------------------------------------------------------------  Neurologic Medications  acetaminophen   IVPB .. 1000 milliGRAM(s) IV Intermittent once PRN Mild Pain (1 - 3)  HYDROmorphone  Injectable 0.25 milliGRAM(s) IV Push every 4 hours PRN Severe Pain (7 - 10)    Respiratory Medications    Cardiovascular Medications  metoprolol tartrate 100 milliGRAM(s) Oral two times a day    Gastrointestinal Medications  lactated ringers. 1000 milliLiter(s) IV Continuous <Continuous>    Genitourinary Medications    Hematologic/Oncologic Medications    Antimicrobial/Immunologic Medications    Endocrine/Metabolic Medications  simvastatin 20 milliGRAM(s) Oral at bedtime    Topical/Other Medications  chlorhexidine 2% Cloths 1 Application(s) Topical <User Schedule>    --------------------------------------------------------------------------------------    VITAL SIGNS, INS/OUTS (last 24 hours):  --------------------------------------------------------------------------------------  ICU Vital Signs Last 24 Hrs  T(C): 36.1 (04 Sep 2022 19:16), Max: 36.2 (04 Sep 2022 17:06)  T(F): 97 (04 Sep 2022 19:16), Max: 97.2 (04 Sep 2022 17:06)  HR: 109 (04 Sep 2022 19:26) (83 - 109)  BP: 116/64 (04 Sep 2022 19:26) (111/67 - 155/88)  BP(mean): --  ABP: --  ABP(mean): --  RR: 20 (04 Sep 2022 19:26) (18 - 20)  SpO2: 98% (04 Sep 2022 19:26) (95% - 99%)    O2 Parameters below as of 04 Sep 2022 19:26  Patient On (Oxygen Delivery Method): room air          I&O's Summary    --------------------------------------------------------------------------------------    EXAM:  General/Neuro:   Exam: Normal, NAD, alert, oriented x 3, no focal deficits. LIN, strength intact b/l UE and LE    Respiratory  Exam: Lungs clear to auscultation, Normal expansion/effort.    Saturating well on RA    Cardiovascular  Exam: S1, S2.  Regular rate and rhythm. - Peripheral edema   Cardiac Rhythm: Normal Sinus Rhythm    GI  Exam: Abdomen soft, Non-tender, Non-distended.  Nasogastric tube in place.    Current Diet:  NPO          Extremities  Exam: Extremities warm, pink, well-perfused.        Derm:  Exam: Good skin turgor, no skin breakdown.      :   Exam: Boss catheter in place.     Tubes/Lines/Drains  ***  [x] Peripheral IV  [] Central Venous Line     	[] R	[] L	[] IJ	[] Fem	[] SC        Type:	    Date Placed:   [] Arterial Line		[] R	[] L	[] Fem	[] Rad	[] Ax	Date Placed:   [] PICC:         	[] Midline		[] Mediport           [] Urinary Catheter		Date Placed:     LABS  --------------------------------------------------------------------------------------  Labs:  CAPILLARY BLOOD GLUCOSE                              17.1   15.74 )-----------( 149      ( 04 Sep 2022 11:45 )             50.8       Auto Neutrophil %: 80.3 % (09-04-22 @ 11:45)  Auto Immature Granulocyte %: 0.6 % (09-04-22 @ 11:45)    09-04    141  |  103  |  47<H>  ----------------------------<  186<H>  4.4   |  21  |  2.2<H>      Calcium, Total Serum: 9.8 mg/dL (09-04-22 @ 11:45)      LFTs:             7.0  | 0.7  | 27       ------------------[70      ( 04 Sep 2022 11:45 )  4.9  | <0.2 | 18          Lipase:35     Amylase:x         Lactate, Blood: 3.7 mmol/L (09-04-22 @ 12:26)      Coags:    CARDIAC MARKERS ( 04 Sep 2022 12:26 )  x     / <0.01 ng/mL / x     / x     / x                    --------------------------------------------------------------------------------------    OTHER LABS    IMAGING RESULTS      < from: CT Abdomen and Pelvis w/ IV Cont (09.04.22 @ 14:35) >  FINDINGS:    LOWER CHEST: There is mild bibasilar subsegmental atelectasis.    HEPATOBILIARY: No focal liver lesion.  No CT evidence of acute   gallbladder pathology.    SPLEEN: Unremarkable.    PANCREAS: Unremarkable.    ADRENAL GLANDS: No acute pathology..    KIDNEYS: There is cortical atrophy of the medial aspect of the left   kidney, renal nephrograms are otherwise symmetric. No hydronephrosis.   There are bilateral renal cysts and hypodensities too small to further   characterize.    ABDOMINOPELVIC NODES: Status post retroperitoneal dissection with   numerous surgical clips and resulting artifact. Similar appearing   prominent lymph nodes adjacent iliac chain, measuring up to 1 cm in the   short axis on the right (4-282).    PELVIC ORGANS: Prostatmegaly. Large bladder calculus measuring up to 1.3   cm.    PERITONEUM/MESENTERY/BOWEL: Closed loop small bowel obstruction. Dilated   fluid-filled loops of small bowel with interloop ascites. Proximal   transition point (4-191), distal transition (4-174), both within the left   hemiabdomen. Of note, a collapsed loop of distal transverse colon also   appears adherent to the point of rotation (4-163 through 4-174). Small   volume abdominopelvic ascites. No intraperitoneal free air. Normal   caliber appendix.     BONES/SOFT TISSUES: No definite acute osseous abnormality. Degenerative   changes of the visualized spine, increased as compared with prior.    VASCULAR: The abdominal aorta is of normal caliber..    OTHER: Partially imaged intracardiac leads.    IMPRESSION:    Findings compatible with closed-loop small bowel obstruction with   interloop ascites as well as associated small volume abdominopelvic   ascites.    A loop of distal transverse colon also appears adherent to the point of   rotation, as above. It is unclear if this represents a developing   associated large bowel obstruction.        < end of copied text >   Patient seen and examined at 20:30, accepted to ICU full note to follow      SICU Consultation Note  =====================================================  HPI: 87y Male w/ PMH HFrEF (15% s/p AICD & PPM), HTN, CKD stage 3 (baseline Cr 1.8), remote hx testicular CA s/p removal of B/L testicles and left lymph node excision in 1988, new bladder calculus presents w/ Closed loop bowel obstruction. Patient presented to the ED with a one day history of lower abdominal pain L>R. Patient states pain strted mildly and then progressed to severe pain 8/10 which propmpted visit to the ER. pain was sharp and crampy associated with an episode of non-bloody, non-bilious vomiting today. Patient reports last BM on day of admission. CT scan was done showing closed loop bowel obstruction. Lactate on admission 3.7. Discussion was had between surgery and family of risks and benefits of surgery at this time. Due to high risk for procedure plan is non operative management at this time. SICU consulted for close monitoring. On SICU examination patient pain is improved s/p morphine and NGT placement. Discussed with patient and son and patient wished to remain full code.     < from: CT Abdomen and Pelvis w/ IV Cont (09.04.22 @ 14:35) >  IMPRESSION:    Findings compatible with closed-loop small bowel obstruction with   interloop ascites as well as associated small volume abdominopelvic   ascites.    A loop of distal transverse colon also appears adherent to the point of   rotation, as above. It is unclear if this represents a developing   associated large bowel obstruction.      < end of copied text >      Patient's PCP is Dr. To Lopez ([facility]).  GI:  Dr. Delgado  Cardiologist:  Dr. Marrero  Urologist:  Dr. Garces (04 Sep 2022 18:52)        PAST MEDICAL & SURGICAL HISTORY:  Chronic CHF (congestive heart failure)  (EF=15)      Dyslipidemia      CKD (chronic kidney disease) stage 3, GFR 30-59 ml/min  (baseline Creat=1.8)      HTN (hypertension)      H/O testicular cancer  1988      S/P implantation of automatic cardioverter/defibrillator (AICD)      History of pacemaker      Dyslipidemia      H/O excision of testicular mass  (Bilateral with LN excision in 1988)        Home Meds: Home Medications:  AndroGel Pump 1.25 g/actuation:  (04 Sep 2022 12:07)  Lasix 40 mg oral tablet: 1 tab(s) orally once a day, As Needed (04 Sep 2022 12:07)  losartan 100 mg oral tablet: 1 tab(s) orally once a day (04 Sep 2022 12:07)  Metoprolol Tartrate 100 mg oral tablet: 1 tab(s) orally 2 times a day (04 Sep 2022 12:07)  simvastatin 20 mg oral tablet: 1 tab(s) orally once a day (at bedtime) (04 Sep 2022 12:07)  spironolactone 25 mg oral tablet: 1 tab(s) orally Monday, Wednesday, and Friday (04 Sep 2022 12:07)    Allergies: Allergies    No Known Allergies    Intolerances      Soc:   Advanced Directives: Presumed Full Code     ROS:    REVIEW OF SYSTEMS    [x] A ten-point review of systems was otherwise negative except as noted.  [ ] Due to altered mental status/intubation, subjective information were not able to be obtained from the patient. History was obtained, to the extent possible, from review of the chart and collateral sources of information.      CURRENT MEDICATIONS:   --------------------------------------------------------------------------------------  Neurologic Medications  acetaminophen   IVPB .. 1000 milliGRAM(s) IV Intermittent once PRN Mild Pain (1 - 3)  HYDROmorphone  Injectable 0.25 milliGRAM(s) IV Push every 4 hours PRN Severe Pain (7 - 10)    Respiratory Medications    Cardiovascular Medications  metoprolol tartrate 100 milliGRAM(s) Oral two times a day    Gastrointestinal Medications  lactated ringers. 1000 milliLiter(s) IV Continuous <Continuous>    Genitourinary Medications    Hematologic/Oncologic Medications    Antimicrobial/Immunologic Medications    Endocrine/Metabolic Medications  simvastatin 20 milliGRAM(s) Oral at bedtime    Topical/Other Medications  chlorhexidine 2% Cloths 1 Application(s) Topical <User Schedule>    --------------------------------------------------------------------------------------    VITAL SIGNS, INS/OUTS (last 24 hours):  --------------------------------------------------------------------------------------  ICU Vital Signs Last 24 Hrs  T(C): 36.1 (04 Sep 2022 19:16), Max: 36.2 (04 Sep 2022 17:06)  T(F): 97 (04 Sep 2022 19:16), Max: 97.2 (04 Sep 2022 17:06)  HR: 109 (04 Sep 2022 19:26) (83 - 109)  BP: 116/64 (04 Sep 2022 19:26) (111/67 - 155/88)  BP(mean): --  ABP: --  ABP(mean): --  RR: 20 (04 Sep 2022 19:26) (18 - 20)  SpO2: 98% (04 Sep 2022 19:26) (95% - 99%)    O2 Parameters below as of 04 Sep 2022 19:26  Patient On (Oxygen Delivery Method): room air          I&O's Summary    --------------------------------------------------------------------------------------    EXAM:  General/Neuro:   Exam: Normal, NAD, alert, oriented x 3, no focal deficits. LIN, strength intact b/l UE and LE    Respiratory  Exam: Lungs clear to auscultation, Normal expansion/effort.    Saturating well on RA    Cardiovascular  Exam: S1, S2.  Regular rate and rhythm. - Peripheral edema   Cardiac Rhythm: Normal Sinus Rhythm    GI  Exam: Abdomen soft, Non-tender, Non-distended.  Nasogastric tube in place.    Current Diet:  NPO          Extremities  Exam: Extremities warm, pink, well-perfused.        Derm:  Exam: Good skin turgor, no skin breakdown.      :   Exam: Boss catheter in place.     Tubes/Lines/Drains  ***  [x] Peripheral IV  [] Central Venous Line     	[] R	[] L	[] IJ	[] Fem	[] SC        Type:	    Date Placed:   [] Arterial Line		[] R	[] L	[] Fem	[] Rad	[] Ax	Date Placed:   [] PICC:         	[] Midline		[] Mediport           [] Urinary Catheter		Date Placed:     LABS  --------------------------------------------------------------------------------------  Labs:  CAPILLARY BLOOD GLUCOSE                              17.1   15.74 )-----------( 149      ( 04 Sep 2022 11:45 )             50.8       Auto Neutrophil %: 80.3 % (09-04-22 @ 11:45)  Auto Immature Granulocyte %: 0.6 % (09-04-22 @ 11:45)    09-04    141  |  103  |  47<H>  ----------------------------<  186<H>  4.4   |  21  |  2.2<H>      Calcium, Total Serum: 9.8 mg/dL (09-04-22 @ 11:45)      LFTs:             7.0  | 0.7  | 27       ------------------[70      ( 04 Sep 2022 11:45 )  4.9  | <0.2 | 18          Lipase:35     Amylase:x         Lactate, Blood: 3.7 mmol/L (09-04-22 @ 12:26)      Coags:    CARDIAC MARKERS ( 04 Sep 2022 12:26 )  x     / <0.01 ng/mL / x     / x     / x                    --------------------------------------------------------------------------------------    OTHER LABS    IMAGING RESULTS      < from: CT Abdomen and Pelvis w/ IV Cont (09.04.22 @ 14:35) >  FINDINGS:    LOWER CHEST: There is mild bibasilar subsegmental atelectasis.    HEPATOBILIARY: No focal liver lesion.  No CT evidence of acute   gallbladder pathology.    SPLEEN: Unremarkable.    PANCREAS: Unremarkable.    ADRENAL GLANDS: No acute pathology..    KIDNEYS: There is cortical atrophy of the medial aspect of the left   kidney, renal nephrograms are otherwise symmetric. No hydronephrosis.   There are bilateral renal cysts and hypodensities too small to further   characterize.    ABDOMINOPELVIC NODES: Status post retroperitoneal dissection with   numerous surgical clips and resulting artifact. Similar appearing   prominent lymph nodes adjacent iliac chain, measuring up to 1 cm in the   short axis on the right (4-282).    PELVIC ORGANS: Prostatmegaly. Large bladder calculus measuring up to 1.3   cm.    PERITONEUM/MESENTERY/BOWEL: Closed loop small bowel obstruction. Dilated   fluid-filled loops of small bowel with interloop ascites. Proximal   transition point (4-191), distal transition (4-174), both within the left   hemiabdomen. Of note, a collapsed loop of distal transverse colon also   appears adherent to the point of rotation (4-163 through 4-174). Small   volume abdominopelvic ascites. No intraperitoneal free air. Normal   caliber appendix.     BONES/SOFT TISSUES: No definite acute osseous abnormality. Degenerative   changes of the visualized spine, increased as compared with prior.    VASCULAR: The abdominal aorta is of normal caliber..    OTHER: Partially imaged intracardiac leads.    IMPRESSION:    Findings compatible with closed-loop small bowel obstruction with   interloop ascites as well as associated small volume abdominopelvic   ascites.    A loop of distal transverse colon also appears adherent to the point of   rotation, as above. It is unclear if this represents a developing   associated large bowel obstruction.        < end of copied text >

## 2022-09-04 NOTE — PATIENT PROFILE ADULT - NSPRESCRALCFREQ_GEN_A_NUR
Implemented All Universal Safety Interventions:  [place] to call system. Call bell, personal items and telephone within reach. Instruct patient to call for assistance. Room bathroom lighting operational. Non-slip footwear when patient is off stretcher. Physically safe environment: no spills, clutter or unnecessary equipment. Stretcher in lowest position, wheels locked, appropriate side rails in place. Never

## 2022-09-04 NOTE — H&P ADULT - NSHPPHYSICALEXAM_GEN_ALL_CORE
PHYSICAL EXAM:    General:  WD, WN, Elderly male in NAD.    Skin:  Warm, dry, good color and turgor, no rash, no pressure ulcers.  Well healed large incision from left lower abdomen to left flank area.    Eyes:  PERRLA, EOM intact.    Neck:  No tenderness.    Back:  No spinal tenderness.    Respiratory:  CTA B/L, No wheezes, rales or rhonchi.    Cardiovascular:  S1 & S2, RRR, no murmurs, rubs or gallops.     Gastrointestinal:   Soft, Mild distention, Moderate tenderness to palpation Left side > Right side.  No rebound, guarding or Peritoneal signs.    Genitourinary:  No testicles,  No suprapubic tenderness,  No CVA tenderness.    Extremities:  Normal rom, no edema, no calf tenderness.    Vascular:  No cyanosis, + Palpable distal pulses, No calf tenderness.     Lymph Nodes:  No lymphadenopathy.    Musculoskeletal:  No joint swelling, Free ROM.    Neurological:   A&Ox4, No focal deficits.

## 2022-09-04 NOTE — ED PROVIDER NOTE - PROGRESS NOTE DETAILS
s/w surg will admit [place] sicu. Herminio: delayed note due to pt care. Pt arrived to [facility]. currently awake, alert, no acute distress, VSS. NGT placed and confirmed by CXR.  Surgery team bedside. Pt discussed with Surg resident Dr. Flores - pt to be admitted to SICU under Dr. Smith.

## 2022-09-04 NOTE — H&P ADULT - NSHPREVIEWOFSYSTEMS_GEN_ALL_CORE
REVIEW OF SYSTEMS:    CONSTITUTIONAL:  No weakness, fevers or chills  EYES/ENT:  No visual changes;  No vertigo or throat pain   NECK:  No pain or stiffness  RESPIRATORY:  No cough, wheezing, hemoptysis; No shortness of breath  CARDIOVASCULAR:  No chest pain or palpitations  GASTROINTESTINAL:  + Lower abdominal pain with nausea and vomiting. No hematemesis;  No diarrhea or constipation. No melena or hematochezia.  GENITOURINARY:  + Bladder obstruction with hesitancy and decreased flow.  No dysuria, frequency or hematuria  MUSCULOSKELETAL:  FROM all extremities, normal strength, No calf tenderness  NEUROLOGICAL:  No numbness or weakness  SKIN:  No itching, rashes

## 2022-09-04 NOTE — ED PROVIDER NOTE - NS ED ATTENDING STATEMENT MOD
This was a shared visit with the WILL. I reviewed and verified the documentation and independently performed the documented:

## 2022-09-04 NOTE — H&P ADULT - ASSESSMENT
Impression:  Patient is a 87 year old male with past medical history of CHF, AICD & PPM due to CHF, HTN, CKD stage III (baseline Creatinine of 1.8), new diagnosis of bladder obstruction due to mass and remote history of testicular cancer (s/p removal of B/L testicles and left lymph node excision in 1988) who now presents in the ER with a one day history of lower abdominal pain, Left > Right .   Patient now with CT scan findings of closed-loop small bowel obstruction.

## 2022-09-05 LAB
A1C WITH ESTIMATED AVERAGE GLUCOSE RESULT: 5.5 % — SIGNIFICANT CHANGE UP (ref 4–5.6)
ANION GAP SERPL CALC-SCNC: 12 MMOL/L — SIGNIFICANT CHANGE UP (ref 7–14)
ANION GAP SERPL CALC-SCNC: 12 MMOL/L — SIGNIFICANT CHANGE UP (ref 7–14)
ANION GAP SERPL CALC-SCNC: 14 MMOL/L — SIGNIFICANT CHANGE UP (ref 7–14)
ANION GAP SERPL CALC-SCNC: 15 MMOL/L — HIGH (ref 7–14)
BASOPHILS # BLD AUTO: 0.02 K/UL — SIGNIFICANT CHANGE UP (ref 0–0.2)
BASOPHILS NFR BLD AUTO: 0.2 % — SIGNIFICANT CHANGE UP (ref 0–1)
BUN SERPL-MCNC: 45 MG/DL — HIGH (ref 10–20)
BUN SERPL-MCNC: 48 MG/DL — HIGH (ref 10–20)
BUN SERPL-MCNC: 49 MG/DL — HIGH (ref 10–20)
BUN SERPL-MCNC: 52 MG/DL — HIGH (ref 10–20)
CALCIUM SERPL-MCNC: 9 MG/DL — SIGNIFICANT CHANGE UP (ref 8.5–10.1)
CALCIUM SERPL-MCNC: 9.7 MG/DL — SIGNIFICANT CHANGE UP (ref 8.5–10.1)
CALCIUM SERPL-MCNC: 9.8 MG/DL — SIGNIFICANT CHANGE UP (ref 8.5–10.1)
CALCIUM SERPL-MCNC: 9.8 MG/DL — SIGNIFICANT CHANGE UP (ref 8.5–10.1)
CHLORIDE SERPL-SCNC: 106 MMOL/L — SIGNIFICANT CHANGE UP (ref 98–110)
CK MB CFR SERPL CALC: 5.9 NG/ML — SIGNIFICANT CHANGE UP (ref 0.6–6.3)
CK MB CFR SERPL CALC: 5.9 NG/ML — SIGNIFICANT CHANGE UP (ref 0.6–6.3)
CK SERPL-CCNC: 66 U/L — SIGNIFICANT CHANGE UP (ref 0–225)
CK SERPL-CCNC: 93 U/L — SIGNIFICANT CHANGE UP (ref 0–225)
CO2 SERPL-SCNC: 22 MMOL/L — SIGNIFICANT CHANGE UP (ref 17–32)
CO2 SERPL-SCNC: 23 MMOL/L — SIGNIFICANT CHANGE UP (ref 17–32)
CO2 SERPL-SCNC: 25 MMOL/L — SIGNIFICANT CHANGE UP (ref 17–32)
CO2 SERPL-SCNC: 29 MMOL/L — SIGNIFICANT CHANGE UP (ref 17–32)
CREAT ?TM UR-MCNC: 146 MG/DL — SIGNIFICANT CHANGE UP
CREAT SERPL-MCNC: 2.1 MG/DL — HIGH (ref 0.7–1.5)
CREAT SERPL-MCNC: 2.2 MG/DL — HIGH (ref 0.7–1.5)
EGFR: 29 ML/MIN/1.73M2 — LOW
EGFR: 30 ML/MIN/1.73M2 — LOW
EOSINOPHIL # BLD AUTO: 0.01 K/UL — SIGNIFICANT CHANGE UP (ref 0–0.7)
EOSINOPHIL NFR BLD AUTO: 0.1 % — SIGNIFICANT CHANGE UP (ref 0–8)
ESTIMATED AVERAGE GLUCOSE: 111 MG/DL — SIGNIFICANT CHANGE UP (ref 68–114)
GLUCOSE BLDC GLUCOMTR-MCNC: 100 MG/DL — HIGH (ref 70–99)
GLUCOSE BLDC GLUCOMTR-MCNC: 104 MG/DL — HIGH (ref 70–99)
GLUCOSE BLDC GLUCOMTR-MCNC: 111 MG/DL — HIGH (ref 70–99)
GLUCOSE BLDC GLUCOMTR-MCNC: 132 MG/DL — HIGH (ref 70–99)
GLUCOSE BLDC GLUCOMTR-MCNC: 96 MG/DL — SIGNIFICANT CHANGE UP (ref 70–99)
GLUCOSE SERPL-MCNC: 109 MG/DL — HIGH (ref 70–99)
GLUCOSE SERPL-MCNC: 116 MG/DL — HIGH (ref 70–99)
GLUCOSE SERPL-MCNC: 146 MG/DL — HIGH (ref 70–99)
GLUCOSE SERPL-MCNC: 96 MG/DL — SIGNIFICANT CHANGE UP (ref 70–99)
HCT VFR BLD CALC: 46.5 % — SIGNIFICANT CHANGE UP (ref 42–52)
HGB BLD-MCNC: 15.4 G/DL — SIGNIFICANT CHANGE UP (ref 14–18)
IMM GRANULOCYTES NFR BLD AUTO: 0.5 % — HIGH (ref 0.1–0.3)
LACTATE SERPL-SCNC: 1.8 MMOL/L — SIGNIFICANT CHANGE UP (ref 0.7–2)
LACTATE SERPL-SCNC: 2.5 MMOL/L — HIGH (ref 0.7–2)
LYMPHOCYTES # BLD AUTO: 1.08 K/UL — LOW (ref 1.2–3.4)
LYMPHOCYTES # BLD AUTO: 8.3 % — LOW (ref 20.5–51.1)
MAGNESIUM SERPL-MCNC: 2.1 MG/DL — SIGNIFICANT CHANGE UP (ref 1.8–2.4)
MAGNESIUM SERPL-MCNC: 2.1 MG/DL — SIGNIFICANT CHANGE UP (ref 1.8–2.4)
MCHC RBC-ENTMCNC: 32.6 PG — HIGH (ref 27–31)
MCHC RBC-ENTMCNC: 33.1 G/DL — SIGNIFICANT CHANGE UP (ref 32–37)
MCV RBC AUTO: 98.3 FL — HIGH (ref 80–94)
MONOCYTES # BLD AUTO: 1.3 K/UL — HIGH (ref 0.1–0.6)
MONOCYTES NFR BLD AUTO: 10 % — HIGH (ref 1.7–9.3)
NEUTROPHILS # BLD AUTO: 10.52 K/UL — HIGH (ref 1.4–6.5)
NEUTROPHILS NFR BLD AUTO: 80.9 % — HIGH (ref 42.2–75.2)
NRBC # BLD: 0 /100 WBCS — SIGNIFICANT CHANGE UP (ref 0–0)
PHOSPHATE SERPL-MCNC: 3.5 MG/DL — SIGNIFICANT CHANGE UP (ref 2.1–4.9)
PHOSPHATE SERPL-MCNC: 4.2 MG/DL — SIGNIFICANT CHANGE UP (ref 2.1–4.9)
PLATELET # BLD AUTO: 107 K/UL — LOW (ref 130–400)
POTASSIUM SERPL-MCNC: 5 MMOL/L — SIGNIFICANT CHANGE UP (ref 3.5–5)
POTASSIUM SERPL-MCNC: 5.2 MMOL/L — HIGH (ref 3.5–5)
POTASSIUM SERPL-MCNC: 5.9 MMOL/L — HIGH (ref 3.5–5)
POTASSIUM SERPL-MCNC: 6.1 MMOL/L — CRITICAL HIGH (ref 3.5–5)
POTASSIUM SERPL-SCNC: 5 MMOL/L — SIGNIFICANT CHANGE UP (ref 3.5–5)
POTASSIUM SERPL-SCNC: 5.2 MMOL/L — HIGH (ref 3.5–5)
POTASSIUM SERPL-SCNC: 5.9 MMOL/L — HIGH (ref 3.5–5)
POTASSIUM SERPL-SCNC: 6.1 MMOL/L — CRITICAL HIGH (ref 3.5–5)
RBC # BLD: 4.73 M/UL — SIGNIFICANT CHANGE UP (ref 4.7–6.1)
RBC # FLD: 13.3 % — SIGNIFICANT CHANGE UP (ref 11.5–14.5)
SODIUM SERPL-SCNC: 143 MMOL/L — SIGNIFICANT CHANGE UP (ref 135–146)
SODIUM SERPL-SCNC: 147 MMOL/L — HIGH (ref 135–146)
SODIUM UR-SCNC: 30 MMOL/L — SIGNIFICANT CHANGE UP
TROPONIN T SERPL-MCNC: <0.01 NG/ML — SIGNIFICANT CHANGE UP
TROPONIN T SERPL-MCNC: <0.01 NG/ML — SIGNIFICANT CHANGE UP
WBC # BLD: 12.99 K/UL — HIGH (ref 4.8–10.8)
WBC # FLD AUTO: 12.99 K/UL — HIGH (ref 4.8–10.8)

## 2022-09-05 PROCEDURE — 93306 TTE W/DOPPLER COMPLETE: CPT | Mod: 26

## 2022-09-05 PROCEDURE — 99232 SBSQ HOSP IP/OBS MODERATE 35: CPT

## 2022-09-05 PROCEDURE — 71045 X-RAY EXAM CHEST 1 VIEW: CPT | Mod: 26

## 2022-09-05 PROCEDURE — 99233 SBSQ HOSP IP/OBS HIGH 50: CPT

## 2022-09-05 PROCEDURE — 93010 ELECTROCARDIOGRAM REPORT: CPT

## 2022-09-05 RX ORDER — CALCIUM GLUCONATE 100 MG/ML
2 VIAL (ML) INTRAVENOUS ONCE
Refills: 0 | Status: COMPLETED | OUTPATIENT
Start: 2022-09-05 | End: 2022-09-05

## 2022-09-05 RX ORDER — ALBUTEROL 90 UG/1
2.5 AEROSOL, METERED ORAL ONCE
Refills: 0 | Status: DISCONTINUED | OUTPATIENT
Start: 2022-09-05 | End: 2022-09-06

## 2022-09-05 RX ORDER — DEXTROSE 50 % IN WATER 50 %
50 SYRINGE (ML) INTRAVENOUS ONCE
Refills: 0 | Status: COMPLETED | OUTPATIENT
Start: 2022-09-05 | End: 2022-09-05

## 2022-09-05 RX ORDER — CALCIUM GLUCONATE 100 MG/ML
1 VIAL (ML) INTRAVENOUS ONCE
Refills: 0 | Status: COMPLETED | OUTPATIENT
Start: 2022-09-05 | End: 2022-09-05

## 2022-09-05 RX ORDER — SODIUM CHLORIDE 9 MG/ML
1000 INJECTION, SOLUTION INTRAVENOUS
Refills: 0 | Status: DISCONTINUED | OUTPATIENT
Start: 2022-09-05 | End: 2022-09-06

## 2022-09-05 RX ORDER — METOPROLOL TARTRATE 50 MG
5 TABLET ORAL EVERY 6 HOURS
Refills: 0 | Status: DISCONTINUED | OUTPATIENT
Start: 2022-09-05 | End: 2022-09-06

## 2022-09-05 RX ORDER — INSULIN HUMAN 100 [IU]/ML
10 INJECTION, SOLUTION SUBCUTANEOUS ONCE
Refills: 0 | Status: COMPLETED | OUTPATIENT
Start: 2022-09-05 | End: 2022-09-05

## 2022-09-05 RX ORDER — SODIUM CHLORIDE 9 MG/ML
250 INJECTION, SOLUTION INTRAVENOUS ONCE
Refills: 0 | Status: COMPLETED | OUTPATIENT
Start: 2022-09-05 | End: 2022-09-05

## 2022-09-05 RX ORDER — PANTOPRAZOLE SODIUM 20 MG/1
40 TABLET, DELAYED RELEASE ORAL DAILY
Refills: 0 | Status: DISCONTINUED | OUTPATIENT
Start: 2022-09-05 | End: 2022-09-06

## 2022-09-05 RX ORDER — METOPROLOL TARTRATE 50 MG
12.5 TABLET ORAL EVERY 12 HOURS
Refills: 0 | Status: DISCONTINUED | OUTPATIENT
Start: 2022-09-05 | End: 2022-09-05

## 2022-09-05 RX ORDER — ONDANSETRON 8 MG/1
4 TABLET, FILM COATED ORAL EVERY 8 HOURS
Refills: 0 | Status: DISCONTINUED | OUTPATIENT
Start: 2022-09-05 | End: 2022-09-06

## 2022-09-05 RX ORDER — BENZOCAINE 10 %
1 GEL (GRAM) MUCOUS MEMBRANE ONCE
Refills: 0 | Status: COMPLETED | OUTPATIENT
Start: 2022-09-05 | End: 2022-09-05

## 2022-09-05 RX ORDER — HEPARIN SODIUM 5000 [USP'U]/ML
5000 INJECTION INTRAVENOUS; SUBCUTANEOUS EVERY 8 HOURS
Refills: 0 | Status: DISCONTINUED | OUTPATIENT
Start: 2022-09-05 | End: 2022-09-06

## 2022-09-05 RX ADMIN — Medication 50 MILLILITER(S): at 01:49

## 2022-09-05 RX ADMIN — HEPARIN SODIUM 5000 UNIT(S): 5000 INJECTION INTRAVENOUS; SUBCUTANEOUS at 23:00

## 2022-09-05 RX ADMIN — INSULIN HUMAN 10 UNIT(S): 100 INJECTION, SOLUTION SUBCUTANEOUS at 08:28

## 2022-09-05 RX ADMIN — Medication 50 MILLILITER(S): at 08:26

## 2022-09-05 RX ADMIN — Medication 5 MILLIGRAM(S): at 13:43

## 2022-09-05 RX ADMIN — Medication 100 GRAM(S): at 01:48

## 2022-09-05 RX ADMIN — ONDANSETRON 4 MILLIGRAM(S): 8 TABLET, FILM COATED ORAL at 14:29

## 2022-09-05 RX ADMIN — PANTOPRAZOLE SODIUM 40 MILLIGRAM(S): 20 TABLET, DELAYED RELEASE ORAL at 11:21

## 2022-09-05 RX ADMIN — Medication 5 MILLIGRAM(S): at 18:23

## 2022-09-05 RX ADMIN — INSULIN HUMAN 10 UNIT(S): 100 INJECTION, SOLUTION SUBCUTANEOUS at 01:59

## 2022-09-05 RX ADMIN — CHLORHEXIDINE GLUCONATE 1 APPLICATION(S): 213 SOLUTION TOPICAL at 05:53

## 2022-09-05 RX ADMIN — Medication 200 GRAM(S): at 08:26

## 2022-09-05 RX ADMIN — Medication 5 MILLIGRAM(S): at 23:51

## 2022-09-05 RX ADMIN — SODIUM CHLORIDE 500 MILLILITER(S): 9 INJECTION, SOLUTION INTRAVENOUS at 07:00

## 2022-09-05 RX ADMIN — Medication 1 SPRAY(S): at 14:28

## 2022-09-05 RX ADMIN — HEPARIN SODIUM 5000 UNIT(S): 5000 INJECTION INTRAVENOUS; SUBCUTANEOUS at 05:51

## 2022-09-05 RX ADMIN — HEPARIN SODIUM 5000 UNIT(S): 5000 INJECTION INTRAVENOUS; SUBCUTANEOUS at 13:44

## 2022-09-05 NOTE — PROGRESS NOTE ADULT - ASSESSMENT
ASSESSMENT:  87 year old male with past medical history of CHF, AICD & PPM due to CHF, HTN, CKD stage III (baseline Creatinine of 1.8), new diagnosis of bladder obstruction due to mass and remote history of testicular cancer (s/p removal of B/L testicles and left lymph node excision in 1988) who now presents in the ER with a one day history of lower abdominal pain, Left > Right .   Patient now with CT scan findings of closed-loop small bowel obstruction.      PLAN:  - Care as per SICU   - Follow up AM labs with lactate   - Follow up TTE  - Cardiology consult for medical optimization   - NGT to suction   - Monitor NGT output   - Strict Is and Os  - Monitor vitals     BLUE TEAM SPECTRA: [id number]

## 2022-09-05 NOTE — CHART NOTE - NSCHARTNOTEFT_GEN_A_CORE
Electrophysiology    Pts device _Bi V ICD ([employer])_____ was interrogated on 06-19-22 via remote monitoring  Device working properly  Events: NSVT 5/13 3sec and 6/3 7sec   Patient is not pacemaker dependent   results d/w with primary team    Reviewed by attending    Contact EP ACP with any questions [phone]

## 2022-09-05 NOTE — PROGRESS NOTE ADULT - ASSESSMENT
Assessment & Plan    87y Male w/ Closed loop bowel obstruction     NEURO:  Acute pain-controlled with IV Tylenol    RESP:   - Saturating well on 2L NC, wean to RA as tolerated  - Encourage IS  - AM CXR  - Activity- Ambulate as tolerated        CARDS:   #Hx HFrEF (15%) w/ AICD and PPM  - Continue Metoprolol 12.5 q12 (takes 100 BID at home)  - Holding home Lasix 40 PRN and Spironolactone 25mg MWF  #Hx HTN  - Holding home losartan 100 daily  #Hx HLD  -Continue home Simvastatin 20 daily  #Lactate  - Lactate 3.7 on admission, repeat 3.5 on IVF, 250cc bolus, f/u repeat in AM  - f/u repeat ECHO  - EKG: atrial sensed ventricular paced rhythm, biventricular pacemaker detected,   - CE negative x1, f/u repeat      GI/NUTR:   Closed loop bowel obstruction  - Diet: NPO  - GI Prophylaxis- PPI  - Bowel regimen- none  - NGT to LCWS        /RENAL:   - Monitor UO-moses in place  - LR @125    Labs:          BUN/Cr- 47/2.2  -->,  49/2.2  -->          Electrolytes-Na 143 // K 6.1 // Mg 1.8 //  Phos 2.8 (09-04 @ 23:13)  #Hyperkalemia  - Treated with Ca, Dextrose, and Insulin  - f/u repeat BMP  #CKD stage 3 (baseline Cr 1.8)  #Testicular CA s/p orchiectomy  - Holding home Testosterone 20.25 MG/ACT (1.62%) Transdermal Gel (AndroGel Pump); APPLY TWO PUMP PRESSES ONE TIME DAILY AS DIRECTED MDD:2 pumps        HEME/ONC:   - DVT prophylaxis-heparin   Injectable, SCDs    Labs: Hb/Hct:  17.1/50.8  -->,  17.7/52.4  -->                      Plts:  149  -->,  143  -->                 PTT/INR:  32.9/0.98  --->         ID:  WBC- 15.74  --->>,  14.26  --->>  Temp trend- 24hrs T(F): 97.8 (09-04 @ 23:25), Max: 97.8 (09-04 @ 23:25)  Antibiotics- none           ENDO:  No chronic Hx  - Glucose Glucose, Serum: 146 (09-04 @ 23:13)  - FS q4 while NPO  - f/u HA1C     LINES/DRAINS:  Gera MARRERO     DISPO:    CHRISTOFER

## 2022-09-05 NOTE — CONSULT NOTE ADULT - ASSESSMENT
Euvolemic mild dilated cardiomyopathy (by echo from 2019, EF 48%) and recovered completely by current echo: EF 61%  Controlled HTN  Mild MELINDA over CKD, due to current status  SBO and even possibly a large bowl obstruction, as per CT scan     Agree to hold Losartan, Simvastatin for now, but possibly benefits from Beta blocker even if it is given as Metoprolol 2.5 mg q 6h IV or using Esmolol if needed  Consider proper hydration to keep on positive I/O balance for now, to prevent MELINDA and complicating septic image  Based on the Hx Surgery team decided conservative approach for now, if condition improves, otherwise he possibly needs surgery, I think initial concept of 15% EF (from 2012) had also impact on the management, advise more hydration and in case of volume overload use Lasix 40 IV PRN Euvolemic mild dilated cardiomyopathy (by echo from 2019, EF 48%) and recovered completely by current echo: EF 61%  Controlled HTN  Mild MELINDA over CKD, due to current status  SBO and even possibly a large bowl obstruction, as per CT scan     Agree to hold Losartan, Simvastatin for now, but possibly benefits from Beta blocker even if it is given as Metoprolol 2.5 mg q 6h IV or using Esmolol if needed  Consider proper hydration to keep on positive I/O balance for now, to prevent MELINDA and complicating septic image  Based on the Hx Surgery team decided conservative approach for now, if condition improves, otherwise he possibly needs surgery, I think initial concept of 15% EF (from 2012) had also an impact on the management, advise more hydration and in case of volume overload use Lasix 40 IV PRN  If needs surgery, which is likely, then he is a moderate-high risk patient with limited functional capacity undergoing a moderate risk surgery RCRI score 2, class III, estmated risk of major adverse cardiac event (MACE) is about 10.1%.  post op needs ICU mentoring  24 h ECG and Troponin, post op  Management as per the event and likely benefts from IV betablocker either as spread out doses or Esmolol drip

## 2022-09-05 NOTE — PROGRESS NOTE ADULT - SUBJECTIVE AND OBJECTIVE BOX
JAMEE STATON  [id number]  87y Male    Indication for ICU admission: hemodynamic monitoring and frequent abdominal exams in the setting of closed loop bowel obstruction and medical comorbidities    Admit Date:09-04-22  ICU Date:09-04-22  OR Date:    No Known Allergies    PAST MEDICAL & SURGICAL HISTORY:  Chronic CHF (congestive heart failure)  (EF=15)    Dyslipidemia    CKD (chronic kidney disease) stage 3, GFR 30-59 ml/min  (baseline Creat=1.8)    HTN (hypertension)    H/O testicular cancer  1988    S/P implantation of automatic cardioverter/defibrillator (AICD)    History of pacemaker    Dyslipidemia    H/O excision of testicular mass  (Bilateral with LN excision in 1988)      Home Medications:  AndroGel Pump 1.25 g/actuation:  (04 Sep 2022 12:07)  Lasix 40 mg oral tablet: 1 tab(s) orally once a day, As Needed (04 Sep 2022 12:07)  losartan 100 mg oral tablet: 1 tab(s) orally once a day (04 Sep 2022 12:07)  Metoprolol Tartrate 100 mg oral tablet: 1 tab(s) orally 2 times a day (04 Sep 2022 12:07)  simvastatin 20 mg oral tablet: 1 tab(s) orally once a day (at bedtime) (04 Sep 2022 12:07)  spironolactone 25 mg oral tablet: 1 tab(s) orally Monday, Wednesday, and Friday (04 Sep 2022 12:07)        24HRS EVENT:  9/5  Night  - AM lactate   - AM potassium  - Hypotensive 250cc bolus  - abdominal pain improved          DVT PTX: HSQ    GI PTX:pantoprazole  Injectable 40 milliGRAM(s) IV Push daily      ***Tubes/Lines/Drains  ***  Peripheral IV  Urinary Catheter		Indication: Strict I&O    Date Placed:       REVIEW OF SYSTEMS    [x A ten-point review of systems was otherwise negative except as noted.  [ ] Due to altered mental status/intubation, subjective information were not able to be obtained from the patient. History was obtained, to the extent possible, from review of the chart and collateral sources of information.

## 2022-09-05 NOTE — CONSULT NOTE ADULT - SUBJECTIVE AND OBJECTIVE BOX
Patient is a 87y old  Male who presents with a chief complaint of Closed loop obstruction (05 Sep 2022 08:03)      HPI:  Known patient to me from [id number] then lost follow up [id number] to now.  Patient was admitted for the SBO with abdominal pain and associated symptoms, which partially improved but still has abdominal pain and is getting analgesics and has been NPO.  All over the chart was written EF 15%??!! (that was 2012) Work ups all done in [facility], latest echo on 2019: EF 48%, grade 1 diastolic dysfunction, mild MR    HOME MEDs: Losartan 100, Metoprolol 50 bid, Simvastatin 20, Aldactone 25 q 48 hours, Lasix 40 PRN    PMHx: Remote Hx of non ischemic dilated cardiomyopathy, AICD implant, HTN, CKD, hyperlipidemia, testicular Ca     87 year old male with past medical history of CHF, AICD & PPM due to CHF, HTN, CKD stage III (baseline Creatinine of 1.8), new diagnosis of bladder obstruction due to mass and remote history of testicular cancer (s/p removal of B/L testicles and left lymph node excision in 1988) who now presents in the ER with a one day history of lower abdominal pain, Left > Right .   Denies radiation of the pain.     Initial progressive pain along with non bloody, non bilious vomiting and urinary hesitancy and decreased flow.  No fever, chills, tremors, diarrhea, constipation, chest pains or SOB.   Patient reports his last colonoscopy was about 10 years ago by Dr. Box.      Patient's PCP is Dr. To Lopez ([facility]).  GI:  Dr. Delgado  Cardiologist:  Dr. Ascencio  Urologist:  Dr. Garces (04 Sep 2022 18:52)      PAST MEDICAL & SURGICAL HISTORY:  Chronic CHF (congestive heart failure)  (EF=15)      Dyslipidemia      CKD (chronic kidney disease) stage 3, GFR 30-59 ml/min  (baseline Creat=1.8)      HTN (hypertension)      H/O testicular cancer  1988      S/P implantation of automatic cardioverter/defibrillator (AICD)      History of pacemaker      Dyslipidemia      H/O excision of testicular mass  (Bilateral with LN excision in 1988)          PREVIOUS DIAGNOSTIC TESTING:      ECHO  FINDINGS: < from: TTE Echo Complete w/o Contrast w/ Doppler (09.05.22 @ 08:12) >   1. Normal global left ventricular systolic function.   2. LV Ejection Fraction by Friedman's Method with a biplane EF of 61 %.   3. Normal left atrial size.   4. Normal right atrial size.   5. Trace mitral valve regurgitation.   6. Mild tricuspid regurgitation.    < end of copied text >    PRIOR ECHO 2019:  < from: Transthoracic Echocardiogram (05.01.19 @ 09:23) >   1. LV Ejection Fraction by Friedman's Method with a biplane EF of 48 %.   2. Spectral Doppler shows impaired relaxation pattern of left   ventricular myocardial filling (Grade I diastolic dysfunction).   3. Mild mitral valve regurgitation.   4. Thickening of the anterior and posterior mitral valve leaflets.   5. Mild to moderate aortic regurgitation.    < end of copied text >      CATHETERIZATION  FINDINGS: in 2013, no CAD, normal patent coronary arteries.    MEDICATIONS  (STANDING):  ALBUTerol    0.083%. 2.5 milliGRAM(s) Nebulizer once  chlorhexidine 2% Cloths 1 Application(s) Topical <User Schedule>  heparin   Injectable 5000 Unit(s) SubCutaneous every 8 hours  lactated ringers. 1000 milliLiter(s) (125 mL/Hr) IV Continuous <Continuous>  metoprolol tartrate Injectable 5 milliGRAM(s) IV Push every 6 hours  pantoprazole  Injectable 40 milliGRAM(s) IV Push daily    MEDICATIONS  (PRN):  acetaminophen   IVPB .. 1000 milliGRAM(s) IV Intermittent once PRN Mild Pain (1 - 3)  ondansetron Injectable 4 milliGRAM(s) IV Push every 8 hours PRN Nausea and/or Vomiting      FAMILY HISTORY:  FH: CAD, MI, CHF both parents (coronary artery disease) (Father, Mother)  MI        SOCIAL HISTORY:  CIGARETTES: ex smoker  ALCOHOL:no  DRUGS: no                      REVIEW OF SYSTEMS:  CONSTITUTIONAL: No distress,  NECK: No pain   RESPIRATORY: No cough, wheezing, shortness of breath  CARDIOVASCULAR: No chest pain, SOB, palpitations, leg swelling  GASTROINTESTINAL: Abdominal pain, initial nausea & vomiting, No hematemesis;  No melena.  NEUROLOGICAL: No dizziness, headaches,  SKIN: No itching, burning, rashes, or lesions   PSYCHIATRIC: No depression, anxiety  ALLERGY: No hives, itching, rash          Vital Signs Last 24 Hrs  T(C): 35.6 (05 Sep 2022 08:00), Max: 36.6 (04 Sep 2022 23:25)  T(F): 96.1 (05 Sep 2022 08:00), Max: 97.8 (04 Sep 2022 23:25)  HR: 101 (05 Sep 2022 17:00) (84 - 110)  BP: 124/71 (05 Sep 2022 17:00) (82/49 - 159/73)  BP(mean): 92 (05 Sep 2022 17:00) (61 - 114)  RR: 20 (05 Sep 2022 17:00) (13 - 292)  SpO2: 100% (05 Sep 2022 17:00) (94% - 100%)    Parameters below as of 05 Sep 2022 12:00  Patient On (Oxygen Delivery Method): room air                          PHYSICAL EXAM:  GENERAL: No respiratory distress,   HEAD:  Atraumatic, Normocephalic  NECK: Supple, No JVD,  NERVOUS SYSTEM:  Alert, Awake, Oriented to time, place, person;   CHEST/LUNG: Normal air entry to lung base bilaterally; No wheeze, crackle,  HEART: Regular heart beat, S1, A2, P2, No S3, No gallop, No murmur  ABDOMEN: Soft, but mildly distended, , Bowel sounds present  EXTREMITIES:  1+ Peripheral Pulses, No clubbing, No edema  SKIN: No rashes or lesions    TELEMETRY: PM rhythm    ECG: < from: 12 Lead ECG (09.04.22 @ 12:51) >  Atrial-sensed ventricular-paced rhythm    < end of copied text >      I&O's Detail    04 Sep 2022 07:01  -  05 Sep 2022 07:00  --------------------------------------------------------  IN:    IV PiggyBack: 50 mL    Lactated Ringers: 700 mL    Lactated Ringers: 250 mL  Total IN: 1000 mL    OUT:    Indwelling Catheter - Urethral (mL): 340 mL  Total OUT: 340 mL    Total NET: 660 mL      05 Sep 2022 07:01  -  05 Sep 2022 18:15  --------------------------------------------------------  IN:    IV PiggyBack: 100 mL    Lactated Ringers: 1250 mL  Total IN: 1350 mL    OUT:    Indwelling Catheter - Urethral (mL): 475 mL  Total OUT: 475 mL    Total NET: 875 mL          LABS:                        17.7   14.26 )-----------( 143      ( 04 Sep 2022 23:13 )             52.4     09-05    143  |  106  |  48<H>  ----------------------------<  109<H>  5.0   |  23  |  2.1<H>    Ca    9.7      05 Sep 2022 11:48  Phos  4.2     09-05  Mg     2.1     09-05    TPro  7.0  /  Alb  4.9  /  TBili  0.7  /  DBili  <0.2  /  AST  27  /  ALT  18  /  AlkPhos  70  09-04    CARDIAC MARKERS ( 05 Sep 2022 11:48 )  x     / <0.01 ng/mL / 93 U/L / x     / 5.9 ng/mL  CARDIAC MARKERS ( 05 Sep 2022 05:59 )  x     / <0.01 ng/mL / 66 U/L / x     / 5.9 ng/mL  CARDIAC MARKERS ( 04 Sep 2022 12:26 )  x     / <0.01 ng/mL / x     / x     / x          PT/INR - ( 04 Sep 2022 23:13 )   PT: 11.30 sec;   INR: 0.98 ratio         PTT - ( 04 Sep 2022 23:13 )  PTT:32.9 sec    I&O's Summary    04 Sep 2022 07:01  -  05 Sep 2022 07:00  --------------------------------------------------------  IN: 1000 mL / OUT: 340 mL / NET: 660 mL    05 Sep 2022 07:01  -  05 Sep 2022 18:15  --------------------------------------------------------  IN: 1350 mL / OUT: 475 mL / NET: 875 mL        RADIOLOGY & ADDITIONAL STUDIES: < from: CT Abdomen and Pelvis w/ IV Cont (09.04.22 @ 14:35) >  Findings compatible with closed-loop small bowel obstruction with   interloop ascites as well as associated small volume abdominopelvic   ascites.    A loop of distal transverse colon also appears adherent to the point of   rotation, as above. It is unclear if this represents a developing   associated large bowel obstruction.      Radiology resident Bertha Hernandez discussed preliminary findings with Dr. Rosalba King at 5:00 PM on 9/4/2022 with readback.    < end of copied text >   Patient is a 87y old  Male who presents with a chief complaint of Closed loop obstruction (05 Sep 2022 08:03)      HPI:  Cardiology consult was requested to have risk stratification and possible cardiac management   Patient was admitted for the SBO with abdominal pain and associated symptoms, which partially improved but still has abdominal pain and is getting analgesics and has been NPO.  All over the chart was written EF 15%??!! (that EF was from 2012) work ups all done in [facility], latest echo on 2019: EF 48%, grade 1 diastolic dysfunction, mild MR, and current one resulted normal.  Known patient to me from [id number] lost f/u for 5 years, but has been followed up since 2021 until now.    HOME MEDs: Losartan 100, Metoprolol 50 bid, Simvastatin 20, Aldactone 25 q 48 hours, Lasix 40 PRN    PMHx: Remote Hx of non ischemic dilated cardiomyopathy, AICD implant, HTN, CKD, hyperlipidemia, testicular Ca     87 year old male with past medical history of CHF, AICD & PPM due to CHF, HTN, CKD stage III (baseline Creatinine of 1.8), new diagnosis of bladder obstruction due to mass and remote history of testicular cancer (s/p removal of B/L testicles and left lymph node excision in 1988) who now presents in the ER with a one day history of lower abdominal pain, Left > Right .   Denies radiation of the pain.     Initial progressive pain along with non bloody, non bilious vomiting and urinary hesitancy and decreased flow.  No fever, chills, tremors, diarrhea, constipation, chest pains or SOB.   Patient reports his last colonoscopy was about 10 years ago by Dr. Box.      Patient's PCP is Dr. To Lopez ([facility]).  GI:  Dr. Delgado  Cardiologist:  Dr. Ascencio  Urologist:  Dr. Garces (04 Sep 2022 18:52)      PAST MEDICAL & SURGICAL HISTORY:  Chronic CHF (congestive heart failure)  (EF started from 15% and over time improved to a normal EF)      Dyslipidemia      CKD (chronic kidney disease) stage 3, GFR 30-59 ml/min  (baseline Creat=1.8)      HTN (hypertension)      H/O testicular cancer  1988      S/P implantation of automatic cardioverter/defibrillator (AICD)      History of pacemaker      Dyslipidemia      H/O excision of testicular mass  (Bilateral with LN excision in 1988)          PREVIOUS DIAGNOSTIC TESTING:      ECHO  FINDINGS: < from: TTE Echo Complete w/o Contrast w/ Doppler (09.05.22 @ 08:12) >   1. Normal global left ventricular systolic function.   2. LV Ejection Fraction by Friedman's Method with a biplane EF of 61 %.   3. Normal left atrial size.   4. Normal right atrial size.   5. Trace mitral valve regurgitation.   6. Mild tricuspid regurgitation.    < end of copied text >    PRIOR ECHO 2019:  < from: Transthoracic Echocardiogram (05.01.19 @ 09:23) >   1. LV Ejection Fraction by Friedman's Method with a biplane EF of 48 %.   2. Spectral Doppler shows impaired relaxation pattern of left   ventricular myocardial filling (Grade I diastolic dysfunction).   3. Mild mitral valve regurgitation.   4. Thickening of the anterior and posterior mitral valve leaflets.   5. Mild to moderate aortic regurgitation.    < end of copied text >      CATHETERIZATION  FINDINGS: in 2013, no CAD, normal patent coronary arteries.    MEDICATIONS  (STANDING):  ALBUTerol    0.083%. 2.5 milliGRAM(s) Nebulizer once  chlorhexidine 2% Cloths 1 Application(s) Topical <User Schedule>  heparin   Injectable 5000 Unit(s) SubCutaneous every 8 hours  lactated ringers. 1000 milliLiter(s) (125 mL/Hr) IV Continuous <Continuous>  metoprolol tartrate Injectable 5 milliGRAM(s) IV Push every 6 hours  pantoprazole  Injectable 40 milliGRAM(s) IV Push daily    MEDICATIONS  (PRN):  acetaminophen   IVPB .. 1000 milliGRAM(s) IV Intermittent once PRN Mild Pain (1 - 3)  ondansetron Injectable 4 milliGRAM(s) IV Push every 8 hours PRN Nausea and/or Vomiting      FAMILY HISTORY:  FH: CAD, MI, CHF both parents (coronary artery disease) (Father, Mother)  MI        SOCIAL HISTORY:  CIGARETTES: ex smoker  ALCOHOL:no  DRUGS: no                      REVIEW OF SYSTEMS:  CONSTITUTIONAL: No distress,  NECK: No pain   RESPIRATORY: No cough, wheezing, shortness of breath  CARDIOVASCULAR: No chest pain, SOB, palpitations, leg swelling  GASTROINTESTINAL: Abdominal pain, initial nausea & vomiting, No hematemesis;  No melena.  NEUROLOGICAL: No dizziness, headaches,  SKIN: No itching, burning, rashes, or lesions   PSYCHIATRIC: No depression, anxiety  ALLERGY: No hives, itching, rash          Vital Signs Last 24 Hrs  T(C): 35.6 (05 Sep 2022 08:00), Max: 36.6 (04 Sep 2022 23:25)  T(F): 96.1 (05 Sep 2022 08:00), Max: 97.8 (04 Sep 2022 23:25)  HR: 101 (05 Sep 2022 17:00) (84 - 110)  BP: 124/71 (05 Sep 2022 17:00) (82/49 - 159/73)  BP(mean): 92 (05 Sep 2022 17:00) (61 - 114)  RR: 20 (05 Sep 2022 17:00) (13 - 292)  SpO2: 100% (05 Sep 2022 17:00) (94% - 100%)    Parameters below as of 05 Sep 2022 12:00  Patient On (Oxygen Delivery Method): room air                          PHYSICAL EXAM:  GENERAL: No respiratory distress,   HEAD:  Atraumatic, Normocephalic  NECK: Supple, No JVD,  NERVOUS SYSTEM:  Alert, Awake, Oriented to time, place, person;   CHEST/LUNG: Normal air entry to lung base bilaterally; No wheeze, crackle,  HEART: Regular heart beat, S1, A2, P2, No S3, No gallop, No murmur  ABDOMEN: Soft, but mildly distended, , Bowel sounds present  EXTREMITIES:  1+ Peripheral Pulses, No clubbing, No edema  SKIN: No rashes or lesions    TELEMETRY: PM rhythm    ECG: < from: 12 Lead ECG (09.04.22 @ 12:51) >  Atrial-sensed ventricular-paced rhythm    < end of copied text >      I&O's Detail    04 Sep 2022 07:01  -  05 Sep 2022 07:00  --------------------------------------------------------  IN:    IV PiggyBack: 50 mL    Lactated Ringers: 700 mL    Lactated Ringers: 250 mL  Total IN: 1000 mL    OUT:    Indwelling Catheter - Urethral (mL): 340 mL  Total OUT: 340 mL    Total NET: 660 mL      05 Sep 2022 07:01  -  05 Sep 2022 18:15  --------------------------------------------------------  IN:    IV PiggyBack: 100 mL    Lactated Ringers: 1250 mL  Total IN: 1350 mL    OUT:    Indwelling Catheter - Urethral (mL): 475 mL  Total OUT: 475 mL    Total NET: 875 mL          LABS:                        17.7   14.26 )-----------( 143      ( 04 Sep 2022 23:13 )             52.4     09-05    143  |  106  |  48<H>  ----------------------------<  109<H>  5.0   |  23  |  2.1<H>    Ca    9.7      05 Sep 2022 11:48  Phos  4.2     09-05  Mg     2.1     09-05    TPro  7.0  /  Alb  4.9  /  TBili  0.7  /  DBili  <0.2  /  AST  27  /  ALT  18  /  AlkPhos  70  09-04    CARDIAC MARKERS ( 05 Sep 2022 11:48 )  x     / <0.01 ng/mL / 93 U/L / x     / 5.9 ng/mL  CARDIAC MARKERS ( 05 Sep 2022 05:59 )  x     / <0.01 ng/mL / 66 U/L / x     / 5.9 ng/mL  CARDIAC MARKERS ( 04 Sep 2022 12:26 )  x     / <0.01 ng/mL / x     / x     / x          PT/INR - ( 04 Sep 2022 23:13 )   PT: 11.30 sec;   INR: 0.98 ratio         PTT - ( 04 Sep 2022 23:13 )  PTT:32.9 sec    I&O's Summary    04 Sep 2022 07:01  -  05 Sep 2022 07:00  --------------------------------------------------------  IN: 1000 mL / OUT: 340 mL / NET: 660 mL    05 Sep 2022 07:01  -  05 Sep 2022 18:15  --------------------------------------------------------  IN: 1350 mL / OUT: 475 mL / NET: 875 mL        RADIOLOGY & ADDITIONAL STUDIES: < from: CT Abdomen and Pelvis w/ IV Cont (09.04.22 @ 14:35) >  Findings compatible with closed-loop small bowel obstruction with   interloop ascites as well as associated small volume abdominopelvic   ascites.    A loop of distal transverse colon also appears adherent to the point of   rotation, as above. It is unclear if this represents a developing   associated large bowel obstruction.      Radiology resident Bertha Hernandez discussed preliminary findings with Dr. Rosalba King at 5:00 PM on 9/4/2022 with readback.    < end of copied text >

## 2022-09-05 NOTE — PROGRESS NOTE ADULT - SUBJECTIVE AND OBJECTIVE BOX
GENERAL SURGERY PROGRESS NOTE    Patient: JAMEE STATON , 87y ([birth year])Male   MRN: [id number]  Location: [address]  Visit: 09-04-22 Inpatient  Date: 09-05-22 @ 08:03    Hospital Day #: 2    Events of past 24 hours:   Night  - Hypotensive 250cc bolus  - abdominal pain improved    PAST MEDICAL & SURGICAL HISTORY:  Chronic CHF (congestive heart failure)  (EF=15)      Dyslipidemia      CKD (chronic kidney disease) stage 3, GFR 30-59 ml/min  (baseline Creat=1.8)      HTN (hypertension)      H/O testicular cancer  1988      S/P implantation of automatic cardioverter/defibrillator (AICD)      History of pacemaker      Dyslipidemia      H/O excision of testicular mass  (Bilateral with LN excision in 1988)          Vitals:   T(F): 96.9 (09-05-22 @ 05:00), Max: 97.8 (09-04-22 @ 23:25)  HR: 93 (09-05-22 @ 07:00)  BP: 139/72 (09-05-22 @ 07:00)  RR: 15 (09-05-22 @ 07:00)  SpO2: 99% (09-05-22 @ 07:00)      Diet, NPO      Fluids: lactated ringers.: Solution, 1000 milliLiter(s) infuse at 125 mL/Hr      I & O's:    09-04-22 @ 07:01  -  09-05-22 @ 07:00  --------------------------------------------------------  IN:    IV PiggyBack: 50 mL    Lactated Ringers: 700 mL    Lactated Ringers: 250 mL  Total IN: 1000 mL    OUT:    Indwelling Catheter - Urethral (mL): 340 mL  Total OUT: 340 mL    Total NET: 660 mL        Bowel Movement: : [] YES [] NO  Flatus: : [] YES [] NO    PHYSICAL EXAM:  General:  NAD  HEENT: PERRLA, EOM intact.  Respiratory:  B/l respiratory effort   Cardiovascular:  S1 & S2, RRR, no murmurs, rubs or gallops.   Gastrointestinal:   Soft, Mild distention, Moderate tenderness to palpation Left side > Right side.  No rebound, guarding or Peritoneal signs.    MEDICATIONS  (STANDING):  ALBUTerol    0.083%. 2.5 milliGRAM(s) Nebulizer once  calcium gluconate IVPB 2 Gram(s) IV Intermittent once  chlorhexidine 2% Cloths 1 Application(s) Topical <User Schedule>  dextrose 50% Injectable 50 milliLiter(s) IV Push once  heparin   Injectable 5000 Unit(s) SubCutaneous every 8 hours  insulin regular  human recombinant 10 Unit(s) IV Push once  lactated ringers. 1000 milliLiter(s) (125 mL/Hr) IV Continuous <Continuous>  metoprolol tartrate 12.5 milliGRAM(s) Oral every 12 hours  pantoprazole  Injectable 40 milliGRAM(s) IV Push daily  simvastatin 20 milliGRAM(s) Oral at bedtime    MEDICATIONS  (PRN):  acetaminophen   IVPB .. 1000 milliGRAM(s) IV Intermittent once PRN Mild Pain (1 - 3)      DVT PROPHYLAXIS: heparin   Injectable 5000 Unit(s) SubCutaneous every 8 hours    GI PROPHYLAXIS: pantoprazole  Injectable 40 milliGRAM(s) IV Push daily    ANTICOAGULATION:   ANTIBIOTICS:            LAB/STUDIES:  Labs:  CAPILLARY BLOOD GLUCOSE      POCT Blood Glucose.: 100 mg/dL (05 Sep 2022 06:54)  POCT Blood Glucose.: 132 mg/dL (05 Sep 2022 01:51)                          17.7   14.26 )-----------( 143      ( 04 Sep 2022 23:13 )             52.4       Auto Neutrophil %: 80.3 % (09-04-22 @ 11:45)  Auto Immature Granulocyte %: 0.6 % (09-04-22 @ 11:45)    09-05    147<H>  |  106  |  52<H>  ----------------------------<  116<H>  5.9<H>   |  29  |  2.2<H>      Calcium, Total Serum: 9.8 mg/dL (09-05-22 @ 05:59)      LFTs:             7.0  | 0.7  | 27       ------------------[70      ( 04 Sep 2022 11:45 )  4.9  | <0.2 | 18          Lipase:35     Amylase:x         Lactate, Blood: 2.5 mmol/L (09-05-22 @ 05:59)  Lactate, Blood: 3.5 mmol/L (09-04-22 @ 23:13)  Lactate, Blood: 3.7 mmol/L (09-04-22 @ 12:26)      Coags:     11.30  ----< 0.98    ( 04 Sep 2022 23:13 )     32.9        CARDIAC MARKERS ( 05 Sep 2022 05:59 )  x     / <0.01 ng/mL / 66 U/L / x     / 5.9 ng/mL  CARDIAC MARKERS ( 04 Sep 2022 12:26 )  x     / <0.01 ng/mL / x     / x     / x                          IMAGING:

## 2022-09-06 ENCOUNTER — TRANSCRIPTION ENCOUNTER (OUTPATIENT)
Age: 87
End: 2022-09-06

## 2022-09-06 LAB
ANION GAP SERPL CALC-SCNC: 12 MMOL/L — SIGNIFICANT CHANGE UP (ref 7–14)
ANION GAP SERPL CALC-SCNC: 9 MMOL/L — SIGNIFICANT CHANGE UP (ref 7–14)
ANION GAP SERPL CALC-SCNC: 9 MMOL/L — SIGNIFICANT CHANGE UP (ref 7–14)
BUN SERPL-MCNC: 42 MG/DL — HIGH (ref 10–20)
BUN SERPL-MCNC: 45 MG/DL — HIGH (ref 10–20)
BUN SERPL-MCNC: 47 MG/DL — HIGH (ref 10–20)
CALCIUM SERPL-MCNC: 8.9 MG/DL — SIGNIFICANT CHANGE UP (ref 8.5–10.1)
CALCIUM SERPL-MCNC: 9.3 MG/DL — SIGNIFICANT CHANGE UP (ref 8.5–10.1)
CALCIUM SERPL-MCNC: 9.6 MG/DL — SIGNIFICANT CHANGE UP (ref 8.5–10.1)
CHLORIDE SERPL-SCNC: 105 MMOL/L — SIGNIFICANT CHANGE UP (ref 98–110)
CHLORIDE SERPL-SCNC: 106 MMOL/L — SIGNIFICANT CHANGE UP (ref 98–110)
CHLORIDE SERPL-SCNC: 107 MMOL/L — SIGNIFICANT CHANGE UP (ref 98–110)
CO2 SERPL-SCNC: 28 MMOL/L — SIGNIFICANT CHANGE UP (ref 17–32)
CO2 SERPL-SCNC: 28 MMOL/L — SIGNIFICANT CHANGE UP (ref 17–32)
CO2 SERPL-SCNC: 29 MMOL/L — SIGNIFICANT CHANGE UP (ref 17–32)
CREAT SERPL-MCNC: 2.1 MG/DL — HIGH (ref 0.7–1.5)
EGFR: 30 ML/MIN/1.73M2 — LOW
GLUCOSE BLDC GLUCOMTR-MCNC: 102 MG/DL — HIGH (ref 70–99)
GLUCOSE BLDC GLUCOMTR-MCNC: 108 MG/DL — HIGH (ref 70–99)
GLUCOSE BLDC GLUCOMTR-MCNC: 113 MG/DL — HIGH (ref 70–99)
GLUCOSE BLDC GLUCOMTR-MCNC: 90 MG/DL — SIGNIFICANT CHANGE UP (ref 70–99)
GLUCOSE SERPL-MCNC: 111 MG/DL — HIGH (ref 70–99)
GLUCOSE SERPL-MCNC: 127 MG/DL — HIGH (ref 70–99)
GLUCOSE SERPL-MCNC: 91 MG/DL — SIGNIFICANT CHANGE UP (ref 70–99)
HCT VFR BLD CALC: 41.1 % — LOW (ref 42–52)
HGB BLD-MCNC: 13.8 G/DL — LOW (ref 14–18)
LACTATE SERPL-SCNC: 1.6 MMOL/L — SIGNIFICANT CHANGE UP (ref 0.7–2)
MAGNESIUM SERPL-MCNC: 2 MG/DL — SIGNIFICANT CHANGE UP (ref 1.8–2.4)
MCHC RBC-ENTMCNC: 32.8 PG — HIGH (ref 27–31)
MCHC RBC-ENTMCNC: 33.6 G/DL — SIGNIFICANT CHANGE UP (ref 32–37)
MCV RBC AUTO: 97.6 FL — HIGH (ref 80–94)
NRBC # BLD: 0 /100 WBCS — SIGNIFICANT CHANGE UP (ref 0–0)
PHOSPHATE SERPL-MCNC: 3.2 MG/DL — SIGNIFICANT CHANGE UP (ref 2.1–4.9)
PLATELET # BLD AUTO: 106 K/UL — LOW (ref 130–400)
POTASSIUM SERPL-MCNC: 4.7 MMOL/L — SIGNIFICANT CHANGE UP (ref 3.5–5)
POTASSIUM SERPL-MCNC: 5 MMOL/L — SIGNIFICANT CHANGE UP (ref 3.5–5)
POTASSIUM SERPL-MCNC: 5.2 MMOL/L — HIGH (ref 3.5–5)
POTASSIUM SERPL-SCNC: 4.7 MMOL/L — SIGNIFICANT CHANGE UP (ref 3.5–5)
POTASSIUM SERPL-SCNC: 5 MMOL/L — SIGNIFICANT CHANGE UP (ref 3.5–5)
POTASSIUM SERPL-SCNC: 5.2 MMOL/L — HIGH (ref 3.5–5)
RBC # BLD: 4.21 M/UL — LOW (ref 4.7–6.1)
RBC # FLD: 13.3 % — SIGNIFICANT CHANGE UP (ref 11.5–14.5)
SODIUM SERPL-SCNC: 142 MMOL/L — SIGNIFICANT CHANGE UP (ref 135–146)
SODIUM SERPL-SCNC: 145 MMOL/L — SIGNIFICANT CHANGE UP (ref 135–146)
SODIUM SERPL-SCNC: 146 MMOL/L — SIGNIFICANT CHANGE UP (ref 135–146)
WBC # BLD: 9.48 K/UL — SIGNIFICANT CHANGE UP (ref 4.8–10.8)
WBC # FLD AUTO: 9.48 K/UL — SIGNIFICANT CHANGE UP (ref 4.8–10.8)

## 2022-09-06 PROCEDURE — 71045 X-RAY EXAM CHEST 1 VIEW: CPT | Mod: 26,77

## 2022-09-06 PROCEDURE — 99233 SBSQ HOSP IP/OBS HIGH 50: CPT

## 2022-09-06 PROCEDURE — 74174 CTA ABD&PLVS W/CONTRAST: CPT | Mod: 26

## 2022-09-06 PROCEDURE — 71045 X-RAY EXAM CHEST 1 VIEW: CPT | Mod: 26

## 2022-09-06 RX ORDER — SODIUM CHLORIDE 9 MG/ML
1000 INJECTION, SOLUTION INTRAVENOUS
Refills: 0 | Status: DISCONTINUED | OUTPATIENT
Start: 2022-09-06 | End: 2022-09-08

## 2022-09-06 RX ORDER — METOPROLOL TARTRATE 50 MG
5 TABLET ORAL EVERY 6 HOURS
Refills: 0 | Status: DISCONTINUED | OUTPATIENT
Start: 2022-09-06 | End: 2022-09-08

## 2022-09-06 RX ORDER — INSULIN HUMAN 100 [IU]/ML
10 INJECTION, SOLUTION SUBCUTANEOUS ONCE
Refills: 0 | Status: COMPLETED | OUTPATIENT
Start: 2022-09-06 | End: 2022-09-06

## 2022-09-06 RX ORDER — CEFOTETAN DISODIUM 1 G
2 VIAL (EA) INJECTION EVERY 12 HOURS
Refills: 0 | Status: DISCONTINUED | OUTPATIENT
Start: 2022-09-06 | End: 2022-09-07

## 2022-09-06 RX ORDER — PANTOPRAZOLE SODIUM 20 MG/1
40 TABLET, DELAYED RELEASE ORAL DAILY
Refills: 0 | Status: DISCONTINUED | OUTPATIENT
Start: 2022-09-06 | End: 2022-09-08

## 2022-09-06 RX ORDER — ONDANSETRON 8 MG/1
4 TABLET, FILM COATED ORAL EVERY 8 HOURS
Refills: 0 | Status: DISCONTINUED | OUTPATIENT
Start: 2022-09-06 | End: 2022-09-10

## 2022-09-06 RX ORDER — CHLORHEXIDINE GLUCONATE 213 G/1000ML
1 SOLUTION TOPICAL
Refills: 0 | Status: DISCONTINUED | OUTPATIENT
Start: 2022-09-06 | End: 2022-09-10

## 2022-09-06 RX ORDER — SODIUM CHLORIDE 9 MG/ML
500 INJECTION, SOLUTION INTRAVENOUS ONCE
Refills: 0 | Status: COMPLETED | OUTPATIENT
Start: 2022-09-06 | End: 2022-09-06

## 2022-09-06 RX ORDER — HEPARIN SODIUM 5000 [USP'U]/ML
5000 INJECTION INTRAVENOUS; SUBCUTANEOUS EVERY 8 HOURS
Refills: 0 | Status: DISCONTINUED | OUTPATIENT
Start: 2022-09-06 | End: 2022-09-10

## 2022-09-06 RX ORDER — DEXTROSE 50 % IN WATER 50 %
50 SYRINGE (ML) INTRAVENOUS ONCE
Refills: 0 | Status: COMPLETED | OUTPATIENT
Start: 2022-09-06 | End: 2022-09-06

## 2022-09-06 RX ORDER — HYDROMORPHONE HYDROCHLORIDE 2 MG/ML
0.25 INJECTION INTRAMUSCULAR; INTRAVENOUS; SUBCUTANEOUS ONCE
Refills: 0 | Status: DISCONTINUED | OUTPATIENT
Start: 2022-09-06 | End: 2022-09-06

## 2022-09-06 RX ADMIN — CHLORHEXIDINE GLUCONATE 1 APPLICATION(S): 213 SOLUTION TOPICAL at 07:33

## 2022-09-06 RX ADMIN — PANTOPRAZOLE SODIUM 40 MILLIGRAM(S): 20 TABLET, DELAYED RELEASE ORAL at 14:09

## 2022-09-06 RX ADMIN — Medication 400 MILLIGRAM(S): at 02:53

## 2022-09-06 RX ADMIN — Medication 5 MILLIGRAM(S): at 23:47

## 2022-09-06 RX ADMIN — HEPARIN SODIUM 5000 UNIT(S): 5000 INJECTION INTRAVENOUS; SUBCUTANEOUS at 14:41

## 2022-09-06 RX ADMIN — Medication 50 MILLILITER(S): at 09:23

## 2022-09-06 RX ADMIN — SODIUM CHLORIDE 1000 MILLILITER(S): 9 INJECTION, SOLUTION INTRAVENOUS at 14:46

## 2022-09-06 RX ADMIN — SODIUM CHLORIDE 125 MILLILITER(S): 9 INJECTION, SOLUTION INTRAVENOUS at 23:47

## 2022-09-06 RX ADMIN — HYDROMORPHONE HYDROCHLORIDE 0.25 MILLIGRAM(S): 2 INJECTION INTRAMUSCULAR; INTRAVENOUS; SUBCUTANEOUS at 04:53

## 2022-09-06 RX ADMIN — Medication 5 MILLIGRAM(S): at 05:58

## 2022-09-06 RX ADMIN — HEPARIN SODIUM 5000 UNIT(S): 5000 INJECTION INTRAVENOUS; SUBCUTANEOUS at 05:58

## 2022-09-06 RX ADMIN — INSULIN HUMAN 10 UNIT(S): 100 INJECTION, SOLUTION SUBCUTANEOUS at 09:23

## 2022-09-06 NOTE — PROGRESS NOTE ADULT - ASSESSMENT
ASSESSMENT:  87 year old male with past medical history of CHF, AICD & PPM due to CHF, HTN, CKD stage III (baseline Creatinine of 1.8), new diagnosis of bladder obstruction due to mass and remote history of testicular cancer (s/p removal of B/L testicles and left lymph node excision in 1988) who now presents in the ER with a one day history of lower abdominal pain, Left > Right .   Patient with CT scan findings of closed-loop small bowel obstruction.      PLAN:  - Care as per SICU   -F/U STAT CT A/P with IV contrast  -Add on for OR Diagnostic Laparoscopy, Possible Open, Lysis of Adhesions, Possible Bowel resection, and all other indicated procedures  - Follow up AM labs with lactate   - Follow up TTE  - Cardiology consult for medical optimization   - NGT to suction   - Monitor NGT output   - Strict Is and Os  - Monitor vitals     BLUE TEAM SPECTRA: 1979

## 2022-09-06 NOTE — CONSULT NOTE ADULT - ATTENDING COMMENTS
87 year old male with past medical history of CHF, AICD & PPM due to CHF, HTN, CKD stage III (baseline Creatinine of 1.8), new diagnosis of bladder obstruction due to mass and remote history of testicular cancer (s/p removal of B/L testicles and left lymph node excision in 1988) presenting with closed loop SBO. Nephrology consulted for MELINDA on CKD3 and need for CT scan with IV contrast.     # MELINDA on CKD 3A / SBO / HTN / bladder obstruction due to mass / SBO  probably prerenal  continue IVF consider LR at 100 cc per hour  at moderate high risk for contrast if used/ patient aware of risk   check IP and PTH   meds reviewed / no antibiotics    will follow
Critical Care: [id number]   This patient has a high probability of sudden, clinically significant deterioration, which requires the highest level of physician preparedness to intervene urgently. I managed/supervised life or organ supporting interventions that required frequent physician assessment. I devoted my full attention in the ICU to the direct care of this patient for the period of time indicated below. Time I spent with the family or surrogate(s) is included only if the patient was incapable of providing the necessary information or participating in medical decision making. Time devoted to teaching and to any procedures I billed separately is not included.     JAMEE STATON 87y Male admitted to [x ] SICU /[ ] SDU with SBO and multiple advance medical problems, including but not limited CHF with EF <15%, ICD,   Patient is examined and evaluated at the bedside with SICU team. Treatment plan discussed with SICU team, nurses and primary team.   Chest X-ray and all relevant studies reviewed during rounds.  Will continue hemodynamic monitoring as per protocol in SICU.    Neuro:  GCS [ 15]   [x ]  Neurovascular checks as per SICU protocol                 [ ] 3% NaCl                     Paralysis [ ] Yes  [x ]  No      Sedated/Pain control with                 [ ] Dilaudid drip, [ ]  Ketamine drip, [ ] Fentanyl drip, [ ] Propofol, [ ] Precedex, [ ] Versed drip, [ ] Ativan drip,                           [ ] OxyContin standing,  [ ] OxyContin PRN, [ ] Dilaudid PRN pushes, [ ] Fentanyl PRN pushes, [ ] PCA,                [x ] Tylenol IV/PO, [ ] Gabapentin, [ ]  Ketorolac, [ ] Tramadol,  [ ] Lidoderm Patch       Other Medications               [ ] Seroquel, [ ] Zyprexa, [ ] Haldol,  [ ] Clonazepam [ ] Xanax, [ ] Versed/Ativan PRN, [ ] Valium [x ] None               [ ] Robaxin   [ ] Baclofen  [ ] Flexeril               [ ] Keppra  [ ] Lamictal  [ ] Depakote  [ ] Dilantin               [ ] CIWA (Ativan/Valium/ Librium)  CV: continue to monitor, hypotension with response to boluses  On pressors [ ]  Yes  [ x]  No          [ ]  Levophed, [ ] Francis-Synephrine, [ ] Vasopressin, [ ]  Epinephrine          [ ] Dobutamine, [ ] Milrinone, [ ]  Midodrine,  [ ] Others    Other Cardiac Meds          [ ] Amiodarone IV/PO, [ ] Digoxin, [ ] Cardizem drip, [ ] Cleviprex drip, [ ] Esmolol drip    Respiratory: Acute respiratory insufficiency -> continue monitoring                        None Invasive Support  [x ] Incentive Spirometer                                  [ ] BiPAP   [ ] CPAP/NIV   [ ] HFNC   [ ] NR Face Mask   [ x] NC  [ ] Trach Caller                        Ventilatory support  [ ] Yes [x ] No                            [ ] SBT                                  [ ] PC    [ ] VC   [ ] AC/PRVC   [ ] BiVent/APRV   [ ]CPAP   GI  [x ]  bowel regiment none  [x ] BM + [x ] Flatus +             [ ] Ostomy   [ x] NG tube        Prophylaxis [ x] PPI  [ ] H2 Blockers  [ ] Others  Nutrition: continue   [x ] Diet NPO  [ ] TPN/PPN   [ ] calories count   [ ]  Tube Feeds     Renal: Continue I&Os monitoring, Boss catheter  [ ] Yes,  [x ]   No ,  [ ] Consideration for discontinuation [ ] Taxes cath/PrimaFit  [ ] TOV  [ ] HD/CVVH       Lytes/Acid-base: replete hypokalemia, hypomagnesemia, hypocalcemia, hypophosphatemia        IV Fluids   [ x] LR,  [ ] NS  [ ] D5W1/2NS [ ] Bicarbonate Drip  [ ] Albumin [ ] Lasix  ID: leukocytosis -> continue to monitor:         IV Abx [ x] Yes, [ ] No;  ID consulted [ ] Yes, [x ] No        Cultures send  [ ] Respiratory   [ ] Blood   [ ] Urine  [ ] Fluids  [ ] Tissue  [x ]  None  Lines:   [ ] Right   [ ] Left  [ ] Bilateral                     [ ] Subclavian TLC        [ ]  Internal Jugular TLC     [ ]  Femoral TLC                     [ ] Subclavian Cordis    [ ] Internal Jugular Cordis   [ ] Femoral Cordis                     [ ] HD catheter    [ ] PICC     [ ]  Midline   [ x] Peripheral IVs                                                 [ ] Right   [ ] Left   [x ] None                     [ ] Radial A-Line    [ ] Femoral A-Line   [ ] Axillary A-Line               Heme: continue to evaluate for acute blood loss anemia- trend Hg/Hct                     AC Reversal Indicated [ ] Yes  [x ] No                    Transfused  indicated  [ ] Yes, [ x] No    [ ] PRBCs   [ ] Platelets   [ ] FFPs   [ ] Cryoprecipitate                    Should be started on or continued with  following  [ ] Yes,  [x ] No                               [ ] Lovenox  [ ] Coumadin  [ ] Heparin drip  [ ] NOVACs  [ ] ASA  [ ] Antiplatelets   Endocrine: Prevent and treat hyperglycemia with insulin as needed,                                 Insuline drip [ ] Yes, [x ] No   PV: follow pulse exam  Skin: decub precautions  DVT Prophylaxis:  [x ] SCDs  [ x] Heparin SQ  [ ] Lovenox  SQ  Stress Gastritis Prophylaxis: PPI/H2 Blockers if indicated  Mobility: patient is evaluated at the bedside with mobility team and the goals for today are discussed with PT [x ]    PATIENT/FAMILY/SURROGATE CONFERENCE:  [x ] Yes with patient at the bedside. [ ] No  PURPOSE: To obtain necessary information, To discuss treatment options under consideration today.    I saw and evaluated the patient personally. I have reviewed and agree with note above. Treatment plan discussed with SICU team, nurses and primary team at the time of the multidisciplinary rounds. The above note is NOT written at the time of rounds and will reflect all changes throughout management of the patient for the day note is written for.    Julieta Smith MD, FACS  Trauma/ACS/SCC Attending

## 2022-09-06 NOTE — CONSULT NOTE ADULT - SUBJECTIVE AND OBJECTIVE BOX
NEPHROLOGY CONSULTATION NOTE    Patient is a 87y Male whom presented to the hospital with SBO    PAST MEDICAL & SURGICAL HISTORY:  Chronic CHF (congestive heart failure)  (EF=15) repeat EF 61%      Dyslipidemia      CKD (chronic kidney disease) stage 3, GFR 30-59 ml/min  (baseline Creat=1.8)      HTN (hypertension)      H/O testicular cancer  1988      S/P implantation of automatic cardioverter/defibrillator (AICD)      History of pacemaker      Dyslipidemia      H/O excision of testicular mass  (Bilateral with LN excision in 1988)        Allergies:  No Known Allergies    Home Medications Reviewed  Hospital Medications:   MEDICATIONS  (STANDING):  ALBUTerol    0.083%. 2.5 milliGRAM(s) Nebulizer once  chlorhexidine 2% Cloths 1 Application(s) Topical <User Schedule>  heparin   Injectable 5000 Unit(s) SubCutaneous every 8 hours  lactated ringers Bolus 500 milliLiter(s) IV Bolus once  lactated ringers. 1000 milliLiter(s) (125 mL/Hr) IV Continuous <Continuous>  metoprolol tartrate Injectable 5 milliGRAM(s) IV Push every 6 hours  pantoprazole  Injectable 40 milliGRAM(s) IV Push daily    SOCIAL HISTORY:  Denies ETOH,Smoking,   FAMILY HISTORY:  FH: CAD (coronary artery disease) (Father, Mother)  MI      VITALS:  Vital Signs Last 24 Hrs  T(C): 36.9 (06 Sep 2022 07:00), Max: 37.3 (05 Sep 2022 23:40)  T(F): 98.5 (06 Sep 2022 07:00), Max: 99.1 (05 Sep 2022 23:40)  HR: 99 (06 Sep 2022 11:00) (84 - 109)  BP: 111/56 (06 Sep 2022 11:00) (90/55 - 164/82)  BP(mean): 74 (06 Sep 2022 11:00) (74 - 116)  RR: 21 (06 Sep 2022 11:00) (16 - 45)  SpO2: 100% (06 Sep 2022 11:00) (98% - 100%)    Parameters below as of 06 Sep 2022 11:00  Patient On (Oxygen Delivery Method): room air        09-05 @ 07:01  -  09-06 @ 07:00  --------------------------------------------------------  IN: 3100 mL / OUT: 1510 mL / NET: 1590 mL    09-06 @ 07:01  -  09-06 @ 11:39  --------------------------------------------------------  IN: 500 mL / OUT: 605 mL / NET: -105 mL        PHYSICAL EXAM:  Constitutional: NAD  HEENT: anicteric sclera, oropharynx clear, MMM  Neck: No JVD  Respiratory: CTAB, no wheezes, rales or rhonchi  Cardiovascular: S1, S2, RRR  Gastrointestinal: NGT to suction, soft abdomen   Extremities: No cyanosis or clubbing. No peripheral edema  Neurological: A/O x 3, no focal deficits  Psychiatric: Normal mood, normal affect  : No CVA tenderness. +moses   Skin: No rashes    LABS:  09-06    142  |  105  |  45<H>  ----------------------------<  127<H>  5.2<H>   |  28  |  2.1<H>    Ca    9.3      06 Sep 2022 05:56  Phos  3.5     09-05  Mg     2.1     09-05    TPro  7.0  /  Alb  4.9  /  TBili  0.7  /  DBili  <0.2  /  AST  27  /  ALT  18  /  AlkPhos  70  09-04    Creatinine Trend: 2.1 <--, 2.2 <--, 2.1 <--, 2.2 <--, 2.2 <--, 2.2 <--                        15.4   12.99 )-----------( 107      ( 05 Sep 2022 21:44 )             46.5     Urine Studies:    Creatinine, Random Urine: 146 mg/dL (09-05 @ 16:11)  Sodium, Random Urine: 30.0 mmoL/L (09-05 @ 16:11)            RADIOLOGY & ADDITIONAL STUDIES:    < from: CT Abdomen and Pelvis w/ IV Cont (09.04.22 @ 14:35) >  CC: [id number] EXAM:  CT ABDOMEN AND PELVIS IC                          PROCEDURE DATE:  09/04/2022          INTERPRETATION:  CLINICAL HISTORY / REASON FOR EXAM: Abdominal pain.    TECHNIQUE: Contiguous axial CT images were obtained from the lower chest   to the pubic symphysis following administration of 95 mL Omnipaque 350   intravenous contrast, 5 mL discarded . Oral contrast was not   administered. Coronal and sagittal reformats were submitted for review.   MIP reconstructions were also submitted for review.    COMPARISON CT: CT abdomen and pelvis dated 4/29/2008    FINDINGS:    LOWER CHEST: There is mild bibasilar subsegmental atelectasis.    HEPATOBILIARY: No focal liver lesion.  No CT evidence of acute   gallbladder pathology.    SPLEEN: Unremarkable.    PANCREAS: Unremarkable.    ADRENAL GLANDS: No acute pathology..    KIDNEYS: There is cortical atrophy of the medial aspect of the left   kidney, renal nephrograms are otherwise symmetric. No hydronephrosis.   There are bilateral renal cysts and hypodensities too small to further   characterize.    ABDOMINOPELVIC NODES: Status post retroperitoneal dissection with   numerous surgical clips and resulting artifact. Similar appearing   prominent lymph nodes adjacent iliac chain, measuring up to 1 cm in the   short axis on the right (4-282).    PELVIC ORGANS: Prostatmegaly. Large bladder calculus measuring up to 1.3   cm.    PERITONEUM/MESENTERY/BOWEL: Closed loop small bowel obstruction. Dilated   fluid-filled loops of small bowel with interloop ascites. Proximal   transition point (4-191), distal transition (4-174), both within the left   hemiabdomen. Of note, a collapsed loop of distal transverse colon also   appears adherent to the point of rotation (4-163 through 4-174). Small   volume abdominopelvic ascites. No intraperitoneal free air. Normal   caliber appendix.     BONES/SOFT TISSUES: No definite acute osseous abnormality. Degenerative   changes of the visualized spine, increased as compared with prior.    VASCULAR: The abdominal aorta is of normal caliber..    OTHER: Partially imaged intracardiac leads.    IMPRESSION:    Findings compatible with closed-loop small bowel obstruction with   interloop ascites as well as associated small volume abdominopelvic   ascites.    A loop of distal transverse colon also appears adherent to the point of   rotation, as above. It is unclear if this represents a developing   associated large bowel obstruction.      Radiology resident Emma Power discussed preliminary findings with Dr. Rosalba King at 5:00 PM on 9/4/2022 with readback.    --- End of Report ---          EMMA POWER MD; Resident Radiologist  This document has been electronically signed.  KRISTY GILL MD; Attending Radiologist  This documenthas been electronically signed. Sep  4 2022  5:29PM    < end of copied text >               NEPHROLOGY CONSULTATION NOTE    Patient is a 87y Male whom presented to the hospital with SBO    PAST MEDICAL & SURGICAL HISTORY:  Chronic CHF (congestive heart failure)  (EF=15) repeat EF 61%      Dyslipidemia      CKD (chronic kidney disease) stage 3, GFR 30-59 ml/min  (baseline Creat=1.8)      HTN (hypertension)      H/O testicular cancer  1988      S/P implantation of automatic cardioverter/defibrillator (AICD)      History of pacemaker      Dyslipidemia      H/O excision of testicular mass  (Bilateral with LN excision in 1988)        Allergies:  No Known Allergies    Home Medications Reviewed  Hospital Medications:   MEDICATIONS  (STANDING):  ALBUTerol    0.083%. 2.5 milliGRAM(s) Nebulizer once  chlorhexidine 2% Cloths 1 Application(s) Topical <User Schedule>  heparin   Injectable 5000 Unit(s) SubCutaneous every 8 hours  lactated ringers Bolus 500 milliLiter(s) IV Bolus once  lactated ringers. 1000 milliLiter(s) (125 mL/Hr) IV Continuous <Continuous>  metoprolol tartrate Injectable 5 milliGRAM(s) IV Push every 6 hours  pantoprazole  Injectable 40 milliGRAM(s) IV Push daily    SOCIAL HISTORY:  Denies ETOH,Smoking,   FAMILY HISTORY:  FH: CAD (coronary artery disease) (Father, Mother)  MI      VITALS:  Vital Signs Last 24 Hrs  T(C): 36.9 (06 Sep 2022 07:00), Max: 37.3 (05 Sep 2022 23:40)  T(F): 98.5 (06 Sep 2022 07:00), Max: 99.1 (05 Sep 2022 23:40)  HR: 99 (06 Sep 2022 11:00) (84 - 109)  BP: 111/56 (06 Sep 2022 11:00) (90/55 - 164/82)  BP(mean): 74 (06 Sep 2022 11:00) (74 - 116)  RR: 21 (06 Sep 2022 11:00) (16 - 45)  SpO2: 100% (06 Sep 2022 11:00) (98% - 100%)    Parameters below as of 06 Sep 2022 11:00  Patient On (Oxygen Delivery Method): room air        09-05 @ 07:01  -  09-06 @ 07:00  --------------------------------------------------------  IN: 3100 mL / OUT: 1510 mL / NET: 1590 mL    09-06 @ 07:01  -  09-06 @ 11:39  --------------------------------------------------------  IN: 500 mL / OUT: 605 mL / NET: -105 mL        PHYSICAL EXAM:  Constitutional: NAD  HEENT: anicteric sclera, oropharynx clear, MMM  Neck: No JVD  Respiratory: CTAB, no wheezes, rales or rhonchi  Cardiovascular: S1, S2, RRR  Gastrointestinal: NGT in place, soft abdomen   Extremities: No cyanosis or clubbing. No peripheral edema  Neurological: A/O x 3, no focal deficits  Psychiatric: Normal mood, normal affect  : No CVA tenderness. +moses   Skin: No rashes    LABS:  09-06    142  |  105  |  45<H>  ----------------------------<  127<H>  5.2<H>   |  28  |  2.1<H>    Ca    9.3      06 Sep 2022 05:56  Phos  3.5     09-05  Mg     2.1     09-05    TPro  7.0  /  Alb  4.9  /  TBili  0.7  /  DBili  <0.2  /  AST  27  /  ALT  18  /  AlkPhos  70  09-04    Creatinine Trend: 2.1 <--, 2.2 <--, 2.1 <--, 2.2 <--, 2.2 <--, 2.2 <--                        15.4   12.99 )-----------( 107      ( 05 Sep 2022 21:44 )             46.5     Urine Studies:    Creatinine, Random Urine: 146 mg/dL (09-05 @ 16:11)  Sodium, Random Urine: 30.0 mmoL/L (09-05 @ 16:11)            RADIOLOGY & ADDITIONAL STUDIES:    < from: CT Abdomen and Pelvis w/ IV Cont (09.04.22 @ 14:35) >  CC: [id number] EXAM:  CT ABDOMEN AND PELVIS IC                          PROCEDURE DATE:  09/04/2022          INTERPRETATION:  CLINICAL HISTORY / REASON FOR EXAM: Abdominal pain.    TECHNIQUE: Contiguous axial CT images were obtained from the lower chest   to the pubic symphysis following administration of 95 mL Omnipaque 350   intravenous contrast, 5 mL discarded . Oral contrast was not   administered. Coronal and sagittal reformats were submitted for review.   MIP reconstructions were also submitted for review.    COMPARISON CT: CT abdomen and pelvis dated 4/29/2008    FINDINGS:    LOWER CHEST: There is mild bibasilar subsegmental atelectasis.    HEPATOBILIARY: No focal liver lesion.  No CT evidence of acute   gallbladder pathology.    SPLEEN: Unremarkable.    PANCREAS: Unremarkable.    ADRENAL GLANDS: No acute pathology..    KIDNEYS: There is cortical atrophy of the medial aspect of the left   kidney, renal nephrograms are otherwise symmetric. No hydronephrosis.   There are bilateral renal cysts and hypodensities too small to further   characterize.    ABDOMINOPELVIC NODES: Status post retroperitoneal dissection with   numerous surgical clips and resulting artifact. Similar appearing   prominent lymph nodes adjacent iliac chain, measuring up to 1 cm in the   short axis on the right (4-282).    PELVIC ORGANS: Prostatmegaly. Large bladder calculus measuring up to 1.3   cm.    PERITONEUM/MESENTERY/BOWEL: Closed loop small bowel obstruction. Dilated   fluid-filled loops of small bowel with interloop ascites. Proximal   transition point (4-191), distal transition (4-174), both within the left   hemiabdomen. Of note, a collapsed loop of distal transverse colon also   appears adherent to the point of rotation (4-163 through 4-174). Small   volume abdominopelvic ascites. No intraperitoneal free air. Normal   caliber appendix.     BONES/SOFT TISSUES: No definite acute osseous abnormality. Degenerative   changes of the visualized spine, increased as compared with prior.    VASCULAR: The abdominal aorta is of normal caliber..    OTHER: Partially imaged intracardiac leads.    IMPRESSION:    Findings compatible with closed-loop small bowel obstruction with   interloop ascites as well as associated small volume abdominopelvic   ascites.    A loop of distal transverse colon also appears adherent to the point of   rotation, as above. It is unclear if this represents a developing   associated large bowel obstruction.      Radiology resident Emma Power discussed preliminary findings with Dr. Rosalba King at 5:00 PM on 9/4/2022 with readback.    --- End of Report ---          EMMA POWER MD; Resident Radiologist  This document has been electronically signed.  KRISTY GILL MD; Attending Radiologist  This documenthas been electronically signed. Sep  4 2022  5:29PM    < end of copied text >               NEPHROLOGY CONSULTATION NOTE      Patient is a 87 year old male with past medical history of CHF, AICD & PPM due to CHF, HTN, CKD stage III (baseline Creatinine of 1.8), new diagnosis of bladder obstruction due to mass and remote history of testicular cancer presenting with abdominal pain going back to several days ptp . has constipation denied nausea no vomiting . Found to have SBO   Known to have CKD 3A 3B due to HTN / CAD and seen by our team as OP   Shandra      PAST MEDICAL & SURGICAL HISTORY:  Chronic CHF (congestive heart failure) (EF=15) repeat EF 61%  Dyslipidemia  CKD (chronic kidney disease) stage 3, GFR 30-59 ml/min  (baseline Creat=1.8)  HTN (hypertension)  H/O testicular cancer 1988  S/P implantation of automatic cardioverter/defibrillator (AICD)  History of pacemaker  Dyslipidemia  H/O excision of testicular mass  (Bilateral with LN excision in 1988)        Allergies:  No Known Allergies    Home Medications Reviewed    Hospital Medications:     MEDICATIONS  (STANDING):    ALBUTerol    0.083%. 2.5 milliGRAM(s) Nebulizer once  heparin   Injectable 5000 Unit(s) SubCutaneous every 8 hours  lactated ringers Bolus 500 milliLiter(s) IV Bolus once  lactated ringers. 1000 milliLiter(s) (125 mL/Hr) IV Continuous <Continuous>  metoprolol tartrate Injectable 5 milliGRAM(s) IV Push every 6 hours  pantoprazole  Injectable 40 milliGRAM(s) IV Push daily    SOCIAL HISTORY:  Denies ETOH,Smoking,   FAMILY HISTORY:  FH: CAD (coronary artery disease) (Father, Mother)  MI      VITALS:  Vital Signs Last 24 Hrs  T(C): 36.9 (06 Sep 2022 07:00), Max: 37.3 (05 Sep 2022 23:40)  T(F): 98.5 (06 Sep 2022 07:00), Max: 99.1 (05 Sep 2022 23:40)  HR: 99 (06 Sep 2022 11:00) (84 - 109)  BP: 111/56 (06 Sep 2022 11:00) (90/55 - 164/82)  BP(mean): 74 (06 Sep 2022 11:00) (74 - 116)  RR: 21 (06 Sep 2022 11:00) (16 - 45)  SpO2: 100% (06 Sep 2022 11:00) (98% - 100%)    Parameters below as of 06 Sep 2022 11:00  Patient On (Oxygen Delivery Method): room air        09-05 @ 07:01  -  09-06 @ 07:00  --------------------------------------------------------  IN: 3100 mL / OUT: 1510 mL / NET: 1590 mL    09-06 @ 07:01  -  09-06 @ 11:39  --------------------------------------------------------  IN: 500 mL / OUT: 605 mL / NET: -105 mL        PHYSICAL EXAM:  Constitutional: NAD/ NGT in place   Neck: No JVD  Respiratory: CTAB,  Cardiovascular: S1, S2, RRR  Gastrointestinal: NGT in place, soft abdomen   Extremities:  No peripheral edema  Neurological: A/O x 3, no focal deficits  Psychiatric: Normal mood, normal affect  : No CVA tenderness. +moses   Skin: No rashes    LABS:  09-06    142  |  105  |  45<H>  ----------------------------<  127<H>  5.2<H>   |  28  |  2.1<H>    Ca    9.3      06 Sep 2022 05:56  Phos  3.5     09-05  Mg     2.1     09-05    TPro  7.0  /  Alb  4.9  /  TBili  0.7  /  DBili  <0.2  /  AST  27  /  ALT  18  /  AlkPhos  70  09-04    Creatinine Trend: 2.1 <--, 2.2 <--, 2.1 <--, 2.2 <--, 2.2 <--, 2.2 <--                        15.4   12.99 )-----------( 107      ( 05 Sep 2022 21:44 )             46.5     Urine Studies:    Creatinine, Random Urine: 146 mg/dL (09-05 @ 16:11)  Sodium, Random Urine: 30.0 mmoL/L (09-05 @ 16:11)            RADIOLOGY & ADDITIONAL STUDIES:    < from: CT Abdomen and Pelvis w/ IV Cont (09.04.22 @ 14:35) >  CC: [id number] EXAM:  CT ABDOMEN AND PELVIS IC                          PROCEDURE DATE:  09/04/2022          INTERPRETATION:  CLINICAL HISTORY / REASON FOR EXAM: Abdominal pain.    TECHNIQUE: Contiguous axial CT images were obtained from the lower chest   to the pubic symphysis following administration of 95 mL Omnipaque 350   intravenous contrast, 5 mL discarded . Oral contrast was not   administered. Coronal and sagittal reformats were submitted for review.   MIP reconstructions were also submitted for review.    COMPARISON CT: CT abdomen and pelvis dated 4/29/2008    FINDINGS:    LOWER CHEST: There is mild bibasilar subsegmental atelectasis.    HEPATOBILIARY: No focal liver lesion.  No CT evidence of acute   gallbladder pathology.    SPLEEN: Unremarkable.    PANCREAS: Unremarkable.    ADRENAL GLANDS: No acute pathology..    KIDNEYS: There is cortical atrophy of the medial aspect of the left   kidney, renal nephrograms are otherwise symmetric. No hydronephrosis.   There are bilateral renal cysts and hypodensities too small to further   characterize.    ABDOMINOPELVIC NODES: Status post retroperitoneal dissection with   numerous surgical clips and resulting artifact. Similar appearing   prominent lymph nodes adjacent iliac chain, measuring up to 1 cm in the   short axis on the right (4-282).    PELVIC ORGANS: Prostatmegaly. Large bladder calculus measuring up to 1.3   cm.    PERITONEUM/MESENTERY/BOWEL: Closed loop small bowel obstruction. Dilated   fluid-filled loops of small bowel with interloop ascites. Proximal   transition point (4-191), distal transition (4-174), both within the left   hemiabdomen. Of note, a collapsed loop of distal transverse colon also   appears adherent to the point of rotation (4-163 through 4-174). Small   volume abdominopelvic ascites. No intraperitoneal free air. Normal   caliber appendix.     BONES/SOFT TISSUES: No definite acute osseous abnormality. Degenerative   changes of the visualized spine, increased as compared with prior.    VASCULAR: The abdominal aorta is of normal caliber..    OTHER: Partially imaged intracardiac leads.    IMPRESSION:    Findings compatible with closed-loop small bowel obstruction with   interloop ascites as well as associated small volume abdominopelvic   ascites.    A loop of distal transverse colon also appears adherent to the point of   rotation, as above. It is unclear if this represents a developing   associated large bowel obstruction.      Radiology resident Bertha Power discussed preliminary findings with Dr. Rosalba King at 5:00 PM on 9/4/2022 with readback.    --- End of Report ---          BERTHA POWER MD; Resident Radiologist  This document has been electronically signed.  KRISTY GILL MD; Attending Radiologist  This documenthas been electronically signed. Sep  4 2022  5:29PM    < end of copied text >

## 2022-09-06 NOTE — PROGRESS NOTE ADULT - SUBJECTIVE AND OBJECTIVE BOX
JAMEE STATON  [id number]  87y Male    Indication for ICU admission: hemodynamic monitoring and frequent abdominal exams in the setting of closed loop bowel obstruction and medical comorbidities    Admit Date:09-04-22  ICU Date:09-04-22  OR Date:    No Known Allergies    PAST MEDICAL & SURGICAL HISTORY:  Chronic CHF (congestive heart failure)  (EF=15)    Dyslipidemia    CKD (chronic kidney disease) stage 3, GFR 30-59 ml/min  (baseline Creat=1.8)    HTN (hypertension)    H/O testicular cancer  1988    S/P implantation of automatic cardioverter/defibrillator (AICD)    History of pacemaker    Dyslipidemia    H/O excision of testicular mass  (Bilateral with LN excision in 1988)      Home Medications:  AndroGel Pump 1.25 g/actuation:  (04 Sep 2022 12:07)  Lasix 40 mg oral tablet: 1 tab(s) orally once a day, As Needed (04 Sep 2022 12:07)  losartan 100 mg oral tablet: 1 tab(s) orally once a day (04 Sep 2022 12:07)  Metoprolol Tartrate 100 mg oral tablet: 1 tab(s) orally 2 times a day (04 Sep 2022 12:07)  simvastatin 20 mg oral tablet: 1 tab(s) orally once a day (at bedtime) (04 Sep 2022 12:07)  spironolactone 25 mg oral tablet: 1 tab(s) orally Monday, Wednesday, and Friday (04 Sep 2022 12:07)        24HRS EVENT:  9/5  Night  - AM lactate   - AM potassium  - Hypotensive 250cc bolus  - abdominal pain improved  -NGT to LCS  -K5.2 (hemolyzed)- EKG and AM BMP    9/5  Day  - Dr. Ascencio cardiologist consult pending- would benefit from metoprolol, keep euvolemic   - AICD interrogated in June, does not need to be re-interrogated. If patient goes to the OR, AICD needs to be rechecked after procedure.   - Continue NGT to LCS  - Troponin neg x 3  - K improved  - lactate cleared   - Metoprolol IV - strict NPO  - EF: 65% on repeat echo, trace MV regurg, mild TR   - urine lytes   - A1c 5.5%        DVT PTX: HSQ    GI PTX:pantoprazole  Injectable 40 milliGRAM(s) IV Push daily      ***Tubes/Lines/Drains  ***  Peripheral IV  Urinary Catheter		Indication: Strict I&O    Date Placed:       REVIEW OF SYSTEMS    [x A ten-point review of systems was otherwise negative except as noted.  [ ] Due to altered mental status/intubation, subjective information were not able to be obtained from the patient. History was obtained, to the extent possible, from review of the chart and collateral sources of information.         JAMEE STATON  [id number]  87y Male    Indication for ICU admission: hemodynamic monitoring and frequent abdominal exams in the setting of closed loop bowel obstruction and medical comorbidities    Admit Date:09-04-22  ICU Date:09-04-22  OR Date:    No Known Allergies    PAST MEDICAL & SURGICAL HISTORY:  Chronic CHF (congestive heart failure)  (EF=15)    Dyslipidemia    CKD (chronic kidney disease) stage 3, GFR 30-59 ml/min  (baseline Creat=1.8)    HTN (hypertension)    H/O testicular cancer  1988    S/P implantation of automatic cardioverter/defibrillator (AICD)    History of pacemaker    Dyslipidemia    H/O excision of testicular mass  (Bilateral with LN excision in 1988)      Home Medications:  AndroGel Pump 1.25 g/actuation:  (04 Sep 2022 12:07)  Lasix 40 mg oral tablet: 1 tab(s) orally once a day, As Needed (04 Sep 2022 12:07)  losartan 100 mg oral tablet: 1 tab(s) orally once a day (04 Sep 2022 12:07)  Metoprolol Tartrate 100 mg oral tablet: 1 tab(s) orally 2 times a day (04 Sep 2022 12:07)  simvastatin 20 mg oral tablet: 1 tab(s) orally once a day (at bedtime) (04 Sep 2022 12:07)  spironolactone 25 mg oral tablet: 1 tab(s) orally Monday, Wednesday, and Friday (04 Sep 2022 12:07)        24HRS EVENT:  9/5  Night  - AM lactate   - AM potassium  - Hypotensive 250cc bolus  - abdominal pain improved  -NGT to LCS  -K5.2 (hemolyzed)- EKG and AM BMP    9/5  Day  - Dr. Ascencio cardiologist consult pending- would benefit from metoprolol, keep euvolemic   - AICD interrogated in June, does not need to be re-interrogated. If patient goes to the OR, AICD needs to be rechecked after procedure.   - Continue NGT to LCS  - Troponin neg x 3  - K improved  - lactate cleared   - Metoprolol IV - strict NPO  - EF: 65% on repeat echo, trace MV regurg, mild TR   - urine lytes   - A1c 5.5%        DVT PTX: HSQ    GI PTX:pantoprazole  Injectable 40 milliGRAM(s) IV Push daily      ***Tubes/Lines/Drains  ***  Peripheral IV  Urinary Catheter		Indication: Strict I&O    Date Placed:       REVIEW OF SYSTEMS    [x A ten-point review of systems was otherwise negative except as noted.  [ ] Due to altered mental status/intubation, subjective information were not able to be obtained from the patient. History was obtained, to the extent possible, from review of the chart and collateral sources of information.      Daily     Diet, NPO (09-05-22 @ 02:48)      CURRENT MEDS:  Neurologic Medications  ondansetron Injectable 4 milliGRAM(s) IV Push every 8 hours PRN Nausea and/or Vomiting    Respiratory Medications  ALBUTerol    0.083%. 2.5 milliGRAM(s) Nebulizer once    Cardiovascular Medications  metoprolol tartrate Injectable 5 milliGRAM(s) IV Push every 6 hours    Gastrointestinal Medications  lactated ringers Bolus 500 milliLiter(s) IV Bolus once  lactated ringers. 1000 milliLiter(s) IV Continuous <Continuous>  pantoprazole  Injectable 40 milliGRAM(s) IV Push daily    Genitourinary Medications    Hematologic/Oncologic Medications  heparin   Injectable 5000 Unit(s) SubCutaneous every 8 hours    Antimicrobial/Immunologic Medications    Endocrine/Metabolic Medications    Topical/Other Medications  chlorhexidine 2% Cloths 1 Application(s) Topical <User Schedule>      ICU Vital Signs Last 24 Hrs  T(C): 36.9 (06 Sep 2022 07:00), Max: 37.3 (05 Sep 2022 23:40)  T(F): 98.5 (06 Sep 2022 07:00), Max: 99.1 (05 Sep 2022 23:40)  HR: 99 (06 Sep 2022 11:00) (84 - 109)  BP: 111/56 (06 Sep 2022 11:00) (90/55 - 164/82)  BP(mean): 74 (06 Sep 2022 11:00) (74 - 116)  ABP: --  ABP(mean): --  RR: 21 (06 Sep 2022 11:00) (16 - 45)  SpO2: 100% (06 Sep 2022 11:00) (98% - 100%)    O2 Parameters below as of 06 Sep 2022 11:00  Patient On (Oxygen Delivery Method): room air        I&O's Summary    05 Sep 2022 07:01  -  06 Sep 2022 07:00  --------------------------------------------------------  IN: 3100 mL / OUT: 1510 mL / NET: 1590 mL    06 Sep 2022 07:01  -  06 Sep 2022 11:22  --------------------------------------------------------  IN: 500 mL / OUT: 605 mL / NET: -105 mL      I&O's Detail    05 Sep 2022 07:01  -  06 Sep 2022 07:00  --------------------------------------------------------  IN:    IV PiggyBack: 100 mL    Lactated Ringers: 3000 mL  Total IN: 3100 mL    OUT:    Indwelling Catheter - Urethral (mL): 1085 mL    Nasogastric/Oral tube (mL): 425 mL  Total OUT: 1510 mL    Total NET: 1590 mL      06 Sep 2022 07:01  -  06 Sep 2022 11:22  --------------------------------------------------------  IN:    Lactated Ringers: 500 mL  Total IN: 500 mL    OUT:    Indwelling Catheter - Urethral (mL): 230 mL    Nasogastric/Oral tube (mL): 375 mL  Total OUT: 605 mL    Total NET: -105 mL      PHYSICAL EXAM:   a&ox3  follows commands, LIN  no acute distress  equal chest rise b/l  abdomen soft  extremities soft  urinary cath in place

## 2022-09-06 NOTE — CONSULT NOTE ADULT - ASSESSMENT
87 year old male with past medical history of CHF, AICD & PPM due to CHF, HTN, CKD stage III (baseline Creatinine of 1.8), new diagnosis of bladder obstruction due to mass and remote history of testicular cancer (s/p removal of B/L testicles and left lymph node excision in 1988) presenting with closed loop SBO. Nephrology consulted for MELINDA on CKD3 and need for CT scan with IV contrast.     #MELINDA on CKD3a    - baseline creatinine 1.8, Cr 2.1 - 2.2 during hospital stay   -CT A/P: cortical atrophy of the medial aspect of the left kidney, renal nephrograms are otherwise symmetric. No hydronephrosis. There are bilateral renal cysts and hypodensities too small to further characterize.  - urine Na 30, urine Cr 146 ->FENa 0.3% -- pre-renal   - recommend IV hydration with Normal Saline before and after CT scan  87 year old male with past medical history of CHF, AICD & PPM due to CHF, HTN, CKD stage III (baseline Creatinine of 1.8), new diagnosis of bladder obstruction due to mass and remote history of testicular cancer (s/p removal of B/L testicles and left lymph node excision in 1988) presenting with closed loop SBO. Nephrology consulted for MELINDA on CKD3 and need for CT scan with IV contrast.     #MELINDA on CKD3a    - baseline creatinine 1.8, Cr 2.1 - 2.2 during hospital stay   -CT A/P: cortical atrophy of the medial aspect of the left kidney, renal nephrograms are otherwise symmetric. No hydronephrosis. There are bilateral renal cysts and hypodensities too small to further characterize.  - urine Na 30, urine Cr 146 ->FENa 0.3% -- pre-renal   - recommend to continue IV hydration before and after CT scan

## 2022-09-06 NOTE — CHART NOTE - NSCHARTNOTEFT_GEN_A_CORE
ANESTHESIA to ICU NOTE      ____ Intubated  TV:______       Rate: ______      FiO2: ______    __x__ Patent Airway    __x__ Full return of protective reflexes    ____ Full recovery from anesthesia / sedation to baseline status    Vitals:  HR 96  /64  RR 15  O2sat. 97%  Temp: 98.2F      Mental Status:  __x__ Awake   _____ Alert   _____ Drowsy   _____ Sedated    Nausea/Vomiting: ____ Yes, See Post - Op Orders      __x__ No    Pain Scale (0-10): _____    Treatment: _x___ None    ____ See Post - Op/PCA Orders    Post - Operative Fluids:   ____ Oral   __x__ See Post - Op Orders    Plan:  Discharge to:   ____Home       _____Floor      _x____Critical Care    _____ Other:_________________    Comments: s/p general anesthesia with ETT. Successful extubation after surgery completion. No anesthesia complications. Pt transferred directly to ICU fully monitored. Pt's condition is stable. Full report is given to ICU RN and SICU PA.

## 2022-09-06 NOTE — PROGRESS NOTE ADULT - SUBJECTIVE AND OBJECTIVE BOX
GENERAL SURGERY PROGRESS NOTE    Patient: JAMEE STATON , 87y ([birth year])Male   MRN: [id number]  Location: [address]  Visit: 09-04-22 Inpatient  Date: 09-06-22 @ 11:10    Hospital Day #: 3  Post-Op Day #:    Procedure/Dx/Injuries: Closed Loop SBO    Events of past 24 hours:  -NAEON  -Patient NGT put out 200cc non bilious gastric content  -NGT to LCS  -K 5.2 (hemolyzed)- EKG and AM BMP  - Abd pain 7/10- abdomen soft, tender to palpation on LQ, notified primary team  - Lactate normal  - AM K 5.2 --> EKG and treated  -u/o 10cc, SBP soft, 500cc bolus given    PAST MEDICAL & SURGICAL HISTORY:  Chronic CHF (congestive heart failure)  (EF=15)      Dyslipidemia      CKD (chronic kidney disease) stage 3, GFR 30-59 ml/min  (baseline Creat=1.8)      HTN (hypertension)      H/O testicular cancer  1988      S/P implantation of automatic cardioverter/defibrillator (AICD)      History of pacemaker      Dyslipidemia      H/O excision of testicular mass  (Bilateral with LN excision in 1988)          Vitals:   T(F): 98.5 (09-06-22 @ 07:00), Max: 99.1 (09-05-22 @ 23:40)  HR: 99 (09-06-22 @ 11:00)  BP: 111/56 (09-06-22 @ 11:00)  RR: 21 (09-06-22 @ 11:00)  SpO2: 100% (09-06-22 @ 11:00)      Diet, NPO      Fluids: lactated ringers Bolus:   500 milliLiter(s), IV Bolus, once, infuse over 30 Minute(s), Stop After 1 Doses  Provider's Contact #: [phone]      I & O's:    09-05-22 @ 07:01  -  09-06-22 @ 07:00  --------------------------------------------------------  IN:    IV PiggyBack: 100 mL    Lactated Ringers: 3000 mL  Total IN: 3100 mL    OUT:    Indwelling Catheter - Urethral (mL): 1085 mL    Nasogastric/Oral tube (mL): 425 mL  Total OUT: 1510 mL    Total NET: 1590 mL    PHYSICAL EXAM:  General:  NAD  HEENT: PERRLA, EOM intact.  Respiratory:  B/l respiratory effort   Cardiovascular:  S1 & S2, RRR, no murmurs, rubs or gallops.   Gastrointestinal:   Soft, Mild distention, Moderate tenderness to palpation Left side > Right side.  No rebound, guarding or Peritoneal signs.    MEDICATIONS  (STANDING):  ALBUTerol    0.083%. 2.5 milliGRAM(s) Nebulizer once  chlorhexidine 2% Cloths 1 Application(s) Topical <User Schedule>  heparin   Injectable 5000 Unit(s) SubCutaneous every 8 hours  lactated ringers Bolus 500 milliLiter(s) IV Bolus once  lactated ringers. 1000 milliLiter(s) (125 mL/Hr) IV Continuous <Continuous>  metoprolol tartrate Injectable 5 milliGRAM(s) IV Push every 6 hours  pantoprazole  Injectable 40 milliGRAM(s) IV Push daily    MEDICATIONS  (PRN):  ondansetron Injectable 4 milliGRAM(s) IV Push every 8 hours PRN Nausea and/or Vomiting      DVT PROPHYLAXIS: heparin   Injectable 5000 Unit(s) SubCutaneous every 8 hours    GI PROPHYLAXIS: pantoprazole  Injectable 40 milliGRAM(s) IV Push daily    ANTICOAGULATION:   ANTIBIOTICS:            LAB/STUDIES:  Labs:  CAPILLARY BLOOD GLUCOSE      POCT Blood Glucose.: 108 mg/dL (06 Sep 2022 09:25)  POCT Blood Glucose.: 113 mg/dL (06 Sep 2022 06:06)  POCT Blood Glucose.: 102 mg/dL (06 Sep 2022 02:48)  POCT Blood Glucose.: 96 mg/dL (05 Sep 2022 22:58)  POCT Blood Glucose.: 104 mg/dL (05 Sep 2022 19:30)                          15.4   12.99 )-----------( 107      ( 05 Sep 2022 21:44 )             46.5       Auto Neutrophil %: 80.9 % (09-05-22 @ 21:44)  Auto Immature Granulocyte %: 0.5 % (09-05-22 @ 21:44)    09-06    142  |  105  |  45<H>  ----------------------------<  127<H>  5.2<H>   |  28  |  2.1<H>      Calcium, Total Serum: 9.3 mg/dL (09-06-22 @ 05:56)      LFTs:             7.0  | 0.7  | 27       ------------------[70      ( 04 Sep 2022 11:45 )  4.9  | <0.2 | 18          Lipase:35     Amylase:x         Lactate, Blood: 1.6 mmol/L (09-06-22 @ 05:56)  Lactate, Blood: 1.8 mmol/L (09-05-22 @ 11:48)  Lactate, Blood: 2.5 mmol/L (09-05-22 @ 05:59)  Lactate, Blood: 3.5 mmol/L (09-04-22 @ 23:13)  Lactate, Blood: 3.7 mmol/L (09-04-22 @ 12:26)      Coags:     11.30  ----< 0.98    ( 04 Sep 2022 23:13 )     32.9        CARDIAC MARKERS ( 05 Sep 2022 11:48 )  x     / <0.01 ng/mL / 93 U/L / x     / 5.9 ng/mL  CARDIAC MARKERS ( 05 Sep 2022 05:59 )  x     / <0.01 ng/mL / 66 U/L / x     / 5.9 ng/mL  CARDIAC MARKERS ( 04 Sep 2022 12:26 )  x     / <0.01 ng/mL / x     / x     / x

## 2022-09-06 NOTE — PROGRESS NOTE ADULT - ASSESSMENT
Assessment & Plan  87y Male w/ Closed loop bowel obstruction     NEURO:  Acute pain-controlled with IV Tylenol    RESP:   - Saturating well on 2L NC, wean to RA as tolerated  - Encourage IS  - AM CXR  - Activity- Ambulate as tolerated        CARDS:   #Hx HFrEF (15%) w/ AICD and PPM  - Continue Metoprolol 5 q6 IV (takes 100 BID at home)  - Holding home Lasix 40 PRN and Spironolactone 25mg MWF  - EF: 65% on repeat echo, trace MV regurg, mild TR   #Hx HTN  - Holding home losartan 100 daily  #Hx HLD  - Holding home Simvastatin 20 daily  #Lactic acidosis - resolved   - EKG: atrial sensed ventricular paced rhythm, biventricular pacemaker detected,   - Troponin neg x 3    GI/NUTR:   Closed loop bowel obstruction  - Diet: NPO  - GI Prophylaxis- PPI  - Bowel regimen- none  - NGT to LCWS      /RENAL:   - Monitor UO-moses in place  - LR @125    Labs:          BUN/Cr- 47/2.2  -->,  49/2.2  -->          Electrolytes-Na 143 // K 5.2 // Mg 2.1//  Phos 3.5 (09-05 @ 21:44)  #Hyperkalemia  -Hemolyzed; EKG and repeat in am  - resolved   #CKD stage 3 (baseline Cr 1.8)  #Testicular CA s/p orchiectomy  - Holding home Testosterone 20.25 MG/ACT (1.62%) Transdermal Gel (AndroGel Pump); APPLY TWO PUMP PRESSES ONE TIME DAILY AS DIRECTED MDD:2 pumps    HEME/ONC:   - DVT prophylaxis-heparin   Injectable, SCDs    Labs: Hb/Hct:  17.1/50.8  -->,  17.7/52.4  -->  15.4/46.5                    Plts:  149  -->,  143  -->  107               PTT/INR:  32.9/0.98  ---> 45/2.2      ID:  WBC- 15.74  --->>,  14.26  --->>12.99             ENDO:  No chronic Hx  - FS q4 while NPO  - HA1C 5.5%    LINES/DRAINS:  PIV, Moses     DISPO:    SICU               Assessment & Plan  87y Male w/ Closed loop bowel obstruction     NEURO:  Acute pain-controlled with IV Tylenol    RESP:   - Saturating well on 2L NC, wean to RA as tolerated  - Encourage IS  - AM CXR  - Activity- Ambulate as tolerated        CARDS:   #Hx HFrEF (15%) w/ AICD and PPM  - Continue Metoprolol 5 q6 IV (takes 100 BID at home)  - Holding home Lasix 40 PRN and Spironolactone 25mg MWF  - EF: 65% on repeat echo, trace MV regurg, mild TR   #Hx HTN  - Holding home losartan 100 daily  #Hx HLD  - Holding home Simvastatin 20 daily  #Lactic acidosis - resolved   - EKG: atrial sensed ventricular paced rhythm, biventricular pacemaker detected,   - Troponin neg x 3    GI/NUTR:   Closed loop bowel obstruction  - Diet: NPO  - GI Prophylaxis- PPI  - Bowel regimen- none  - NGT to LCWS      /RENAL:   - Monitor UO-moses in place  - LR @125    Labs:          BUN/Cr- 47/2.2  -->,  49/2.2  -->          Electrolytes-Na 143 // K 5.2 // Mg 2.1//  Phos 3.5 (09-05 @ 21:44)  #Hyperkalemia  -Hemolyzed; EKG and repeat in am  - resolved   #MELINDA on CKD stage 3 (baseline Cr 1.8)  #Testicular CA s/p orchiectomy  - Holding home Testosterone 20.25 MG/ACT (1.62%) Transdermal Gel (AndroGel Pump); APPLY TWO PUMP PRESSES ONE TIME DAILY AS DIRECTED MDD:2 pumps    HEME/ONC:   - DVT prophylaxis-heparin   Injectable, SCDs    Labs: Hb/Hct:  17.1/50.8  -->,  17.7/52.4  -->  15.4/46.5                    Plts:  149  -->,  143  -->  107               PTT/INR:  32.9/0.98  ---> 45/2.2      ID:  WBC- 15.74  --->>,  14.26  --->>12.99        ENDO:  No chronic Hx  - FS q4 while NPO  - HA1C 5.5%    LINES/DRAINS:  PIV, Moses, NGT    DISPO:    SICU

## 2022-09-06 NOTE — CONSULT NOTE ADULT - CONSULT REASON
Hemodynamic monitoring in the setting of closed loop bowel obstruction with medical comorbidities
MELINDA on CKD3 and need for IV contrast
cardiomyopathy, cardiac risk assessment ad management

## 2022-09-07 DIAGNOSIS — K56.609 UNSPECIFIED INTESTINAL OBSTRUCTION, UNSPECIFIED AS TO PARTIAL VERSUS COMPLETE OBSTRUCTION: ICD-10-CM

## 2022-09-07 LAB
ANION GAP SERPL CALC-SCNC: 11 MMOL/L — SIGNIFICANT CHANGE UP (ref 7–14)
ANION GAP SERPL CALC-SCNC: 13 MMOL/L — SIGNIFICANT CHANGE UP (ref 7–14)
BASOPHILS # BLD AUTO: 0.02 K/UL — SIGNIFICANT CHANGE UP (ref 0–0.2)
BASOPHILS NFR BLD AUTO: 0.2 % — SIGNIFICANT CHANGE UP (ref 0–1)
BUN SERPL-MCNC: 35 MG/DL — HIGH (ref 10–20)
BUN SERPL-MCNC: 36 MG/DL — HIGH (ref 10–20)
CALCIUM SERPL-MCNC: 8.3 MG/DL — LOW (ref 8.5–10.1)
CALCIUM SERPL-MCNC: 8.6 MG/DL — SIGNIFICANT CHANGE UP (ref 8.5–10.1)
CALCIUM SERPL-MCNC: 9.2 MG/DL — SIGNIFICANT CHANGE UP (ref 8.4–10.5)
CHLORIDE SERPL-SCNC: 104 MMOL/L — SIGNIFICANT CHANGE UP (ref 98–110)
CHLORIDE SERPL-SCNC: 105 MMOL/L — SIGNIFICANT CHANGE UP (ref 98–110)
CK MB CFR SERPL CALC: 3.4 NG/ML — SIGNIFICANT CHANGE UP (ref 0.6–6.3)
CK SERPL-CCNC: 113 U/L — SIGNIFICANT CHANGE UP (ref 0–225)
CO2 SERPL-SCNC: 24 MMOL/L — SIGNIFICANT CHANGE UP (ref 17–32)
CO2 SERPL-SCNC: 27 MMOL/L — SIGNIFICANT CHANGE UP (ref 17–32)
CREAT SERPL-MCNC: 1.9 MG/DL — HIGH (ref 0.7–1.5)
CREAT SERPL-MCNC: 2.4 MG/DL — HIGH (ref 0.7–1.5)
EGFR: 26 ML/MIN/1.73M2 — LOW
EGFR: 34 ML/MIN/1.73M2 — LOW
EOSINOPHIL # BLD AUTO: 0.04 K/UL — SIGNIFICANT CHANGE UP (ref 0–0.7)
EOSINOPHIL NFR BLD AUTO: 0.4 % — SIGNIFICANT CHANGE UP (ref 0–8)
GLUCOSE BLDC GLUCOMTR-MCNC: 90 MG/DL — SIGNIFICANT CHANGE UP (ref 70–99)
GLUCOSE SERPL-MCNC: 172 MG/DL — HIGH (ref 70–99)
GLUCOSE SERPL-MCNC: 96 MG/DL — SIGNIFICANT CHANGE UP (ref 70–99)
HCT VFR BLD CALC: 38.5 % — LOW (ref 42–52)
HCT VFR BLD CALC: 41.8 % — LOW (ref 42–52)
HGB BLD-MCNC: 12.6 G/DL — LOW (ref 14–18)
HGB BLD-MCNC: 14 G/DL — SIGNIFICANT CHANGE UP (ref 14–18)
IMM GRANULOCYTES NFR BLD AUTO: 0.2 % — SIGNIFICANT CHANGE UP (ref 0.1–0.3)
LACTATE SERPL-SCNC: 1 MMOL/L — SIGNIFICANT CHANGE UP (ref 0.7–2)
LYMPHOCYTES # BLD AUTO: 0.56 K/UL — LOW (ref 1.2–3.4)
LYMPHOCYTES # BLD AUTO: 6.1 % — LOW (ref 20.5–51.1)
MAGNESIUM SERPL-MCNC: 1.9 MG/DL — SIGNIFICANT CHANGE UP (ref 1.8–2.4)
MAGNESIUM SERPL-MCNC: 2 MG/DL — SIGNIFICANT CHANGE UP (ref 1.8–2.4)
MCHC RBC-ENTMCNC: 32.5 PG — HIGH (ref 27–31)
MCHC RBC-ENTMCNC: 32.7 G/DL — SIGNIFICANT CHANGE UP (ref 32–37)
MCHC RBC-ENTMCNC: 33 PG — HIGH (ref 27–31)
MCHC RBC-ENTMCNC: 33.5 G/DL — SIGNIFICANT CHANGE UP (ref 32–37)
MCV RBC AUTO: 98.6 FL — HIGH (ref 80–94)
MCV RBC AUTO: 99.2 FL — HIGH (ref 80–94)
MONOCYTES # BLD AUTO: 0.92 K/UL — HIGH (ref 0.1–0.6)
MONOCYTES NFR BLD AUTO: 9.9 % — HIGH (ref 1.7–9.3)
NEUTROPHILS # BLD AUTO: 7.69 K/UL — HIGH (ref 1.4–6.5)
NEUTROPHILS NFR BLD AUTO: 83.2 % — HIGH (ref 42.2–75.2)
NRBC # BLD: 0 /100 WBCS — SIGNIFICANT CHANGE UP (ref 0–0)
NRBC # BLD: 0 /100 WBCS — SIGNIFICANT CHANGE UP (ref 0–0)
PHOSPHATE SERPL-MCNC: 2.9 MG/DL — SIGNIFICANT CHANGE UP (ref 2.1–4.9)
PHOSPHATE SERPL-MCNC: 3.3 MG/DL — SIGNIFICANT CHANGE UP (ref 2.1–4.9)
PLATELET # BLD AUTO: 112 K/UL — LOW (ref 130–400)
PLATELET # BLD AUTO: 91 K/UL — LOW (ref 130–400)
POTASSIUM SERPL-MCNC: 4.3 MMOL/L — SIGNIFICANT CHANGE UP (ref 3.5–5)
POTASSIUM SERPL-MCNC: 4.7 MMOL/L — SIGNIFICANT CHANGE UP (ref 3.5–5)
POTASSIUM SERPL-SCNC: 4.3 MMOL/L — SIGNIFICANT CHANGE UP (ref 3.5–5)
POTASSIUM SERPL-SCNC: 4.7 MMOL/L — SIGNIFICANT CHANGE UP (ref 3.5–5)
PTH-INTACT FLD-MCNC: 21 PG/ML — SIGNIFICANT CHANGE UP (ref 15–65)
RBC # BLD: 3.88 M/UL — LOW (ref 4.7–6.1)
RBC # BLD: 4.24 M/UL — LOW (ref 4.7–6.1)
RBC # FLD: 13.2 % — SIGNIFICANT CHANGE UP (ref 11.5–14.5)
RBC # FLD: 13.2 % — SIGNIFICANT CHANGE UP (ref 11.5–14.5)
SODIUM SERPL-SCNC: 142 MMOL/L — SIGNIFICANT CHANGE UP (ref 135–146)
SODIUM SERPL-SCNC: 142 MMOL/L — SIGNIFICANT CHANGE UP (ref 135–146)
TROPONIN T SERPL-MCNC: <0.01 NG/ML — SIGNIFICANT CHANGE UP
WBC # BLD: 9.22 K/UL — SIGNIFICANT CHANGE UP (ref 4.8–10.8)
WBC # BLD: 9.25 K/UL — SIGNIFICANT CHANGE UP (ref 4.8–10.8)
WBC # FLD AUTO: 9.22 K/UL — SIGNIFICANT CHANGE UP (ref 4.8–10.8)
WBC # FLD AUTO: 9.25 K/UL — SIGNIFICANT CHANGE UP (ref 4.8–10.8)

## 2022-09-07 PROCEDURE — 93280 PM DEVICE PROGR EVAL DUAL: CPT | Mod: 26

## 2022-09-07 PROCEDURE — 99233 SBSQ HOSP IP/OBS HIGH 50: CPT

## 2022-09-07 PROCEDURE — 71045 X-RAY EXAM CHEST 1 VIEW: CPT | Mod: 26

## 2022-09-07 PROCEDURE — 71045 X-RAY EXAM CHEST 1 VIEW: CPT | Mod: 26,77

## 2022-09-07 PROCEDURE — 44180 LAP ENTEROLYSIS: CPT

## 2022-09-07 PROCEDURE — 93010 ELECTROCARDIOGRAM REPORT: CPT

## 2022-09-07 RX ORDER — ACETAMINOPHEN 500 MG
1000 TABLET ORAL ONCE
Refills: 0 | Status: DISCONTINUED | OUTPATIENT
Start: 2022-09-07 | End: 2022-09-08

## 2022-09-07 RX ORDER — CEFOTETAN DISODIUM 1 G
1 VIAL (EA) INJECTION EVERY 12 HOURS
Refills: 0 | Status: COMPLETED | OUTPATIENT
Start: 2022-09-07 | End: 2022-09-07

## 2022-09-07 RX ADMIN — Medication 100 GRAM(S): at 05:31

## 2022-09-07 RX ADMIN — HEPARIN SODIUM 5000 UNIT(S): 5000 INJECTION INTRAVENOUS; SUBCUTANEOUS at 13:11

## 2022-09-07 RX ADMIN — Medication 5 MILLIGRAM(S): at 12:17

## 2022-09-07 RX ADMIN — Medication 100 GRAM(S): at 18:48

## 2022-09-07 RX ADMIN — PANTOPRAZOLE SODIUM 40 MILLIGRAM(S): 20 TABLET, DELAYED RELEASE ORAL at 12:17

## 2022-09-07 RX ADMIN — HEPARIN SODIUM 5000 UNIT(S): 5000 INJECTION INTRAVENOUS; SUBCUTANEOUS at 21:15

## 2022-09-07 RX ADMIN — Medication 5 MILLIGRAM(S): at 05:30

## 2022-09-07 RX ADMIN — CHLORHEXIDINE GLUCONATE 1 APPLICATION(S): 213 SOLUTION TOPICAL at 05:38

## 2022-09-07 RX ADMIN — HEPARIN SODIUM 5000 UNIT(S): 5000 INJECTION INTRAVENOUS; SUBCUTANEOUS at 05:30

## 2022-09-07 RX ADMIN — Medication 5 MILLIGRAM(S): at 17:47

## 2022-09-07 NOTE — CHART NOTE - NSCHARTNOTEFT_GEN_A_CORE
[employer] BiV ICD interrogated 9/7/22  One episode of NSVT x 1 second on 12/31/21  No further episodes of VT  Device functioning properly    Mode: DDD 50-130bpm  AP 0.9%   99.9%    Recall EP as needed 1850

## 2022-09-07 NOTE — PROGRESS NOTE ADULT - ASSESSMENT
87 year old male with past medical history of CHF, AICD & PPM due to CHF, HTN, CKD stage III (baseline Creatinine of 1.8), new diagnosis of bladder obstruction due to mass and remote history of testicular cancer (s/p removal of B/L testicles and left lymph node excision in 1988) presenting with closed loop SBO. Nephrology consulted for MELINDA on CKD3 and need for CT scan with IV contrast.     # MELINDA on CKD 3A / SBO / HTN / bladder obstruction due to mass / SBO  probably prerenal sp OR and removal of adhesions   continue IVF  LR at 100 cc per hour follow UOP / sp repeat CT with contrast yesterday   follow BMP IP   meds reviewed / on cefotetan dose ok     will follow .

## 2022-09-07 NOTE — PROGRESS NOTE ADULT - ASSESSMENT
ASSESSMENT:  87M w/ PMH of CHF with AICD/PPM placement, HTN, and CKD presents with abdominal pain, found to have CT findings suspicious for closed loop obstruction, now s/p diagnostic laparoscopy with lysis of adhesions.     PLAN:   - Care per SICU   - Strict NPO, maintain NGT   - IVF   - Pain control    - Complete lavinia-op Abx (cefotetan)   - Monitor for bowel function   - Monitor UOP     SPECTRA [id number]

## 2022-09-07 NOTE — PROGRESS NOTE ADULT - ASSESSMENT
Assessment & Plan  87y Male w/ Closed loop bowel obstruction     NEURO:  Acute pain-controlled with IV Tylenol    RESP:   - Saturating well on 2L NC, wean to RA as tolerated  - Encourage IS  - AM CXR  - Activity- Ambulate as tolerated        CARDS:   #Hx HFrEF (15%) w/ AICD and PPM  - Continue Metoprolol 5 q6 IV (takes 100 BID at home)  - Holding home Lasix 40 PRN and Spironolactone 25mg MWF  - EF: 65% on repeat echo, trace MV regurg, mild TR   #Hx HTN  - Holding home losartan 100 daily  #Hx HLD  - Holding home Simvastatin 20 daily  #Lactic acidosis - resolved   - EKG: atrial sensed ventricular paced rhythm, biventricular pacemaker detected,   - Troponin neg x 3    GI/NUTR:   Closed loop bowel obstruction  - Diet: NPO  - GI Prophylaxis- PPI  - Bowel regimen- none  - NGT to LCWS  - CTAP 9/6:     /RENAL:   - Monitor UO-moses in place  - LR @125    Labs:          BUN/Cr- 47/2.1  -->,  42/2.1  -->          Electrolytes-Na 145 // K 4.7 // Mg 2.0 //  Phos 3.2    #Hyperkalemia  - Treated with Ca, Dextrose, and Insulin  - resolved   #CKD stage 3 (baseline Cr 1.8)  - Nephro consult for MELINDA on CKD with need for CT with IV contrast- obtain IP and PTH, hydrate with LR @ 1cc/kg/hr  #Testicular CA s/p orchiectomy  - Holding home Testosterone 20.25 MG/ACT (1.62%) Transdermal Gel (AndroGel Pump); APPLY TWO PUMP PRESSES ONE TIME DAILY AS DIRECTED MDD:2 pumps    HEME/ONC:   - DVT prophylaxis-heparin   Injectable, SCDs    Labs: Hb/Hct:  13.8/41              Plts:  106              PTT/INR:  32.9/0.98  --->       ID:  WBC- 9.5  Temp trend- 24hrs T(F): 97.8 (09-04 @ 23:25), Max: 97.8 (09-04 @ 23:25)  Antibiotics- none           ENDO:  No chronic Hx  - FS q4 while NPO  - HA1C 5.5%    LINES/DRAINS:  PIV, Moses     DISPO:    SICU     Assessment & Plan  87y Male w/ Closed loop bowel obstruction     NEURO:  Acute pain-controlled with IV Tylenol    RESP:   - Saturating well on RA   - Encourage IS  - AM CXR  - Activity- Ambulate as tolerated        CARDS:   #Hx HFrEF (15%) w/ AICD and PPM  - Continue Metoprolol 5 q6 IV (takes 100 BID at home)  - Holding home Lasix 40 PRN and Spironolactone 25mg MWF  - EF: 65% on repeat echo, trace MV regurg, mild TR   #Hx HTN  - Holding home losartan 100 daily  #Hx HLD  - Holding home Simvastatin 20 daily  #Lactic acidosis - resolved   - EKG: atrial sensed ventricular paced rhythm, biventricular pacemaker detected,   - Troponin neg x 3    GI/NUTR:   Closed loop bowel obstruction  - Diet: NPO  - GI Prophylaxis- PPI  - Bowel regimen- none  - NGT to LCWS  - CTAP 9/6:     /RENAL:   - Monitor UO-moses in place  - LR @125    Labs:          BUN/Cr- 47/2.1  -->,  42/2.1  -->          Electrolytes-Na 145 // K 4.7 // Mg 2.0 //  Phos 3.2    #Hyperkalemia  - Treated with Ca, Dextrose, and Insulin  - resolved   #CKD stage 3 (baseline Cr 1.8)  - Nephro consult for MELINDA on CKD with need for CT with IV contrast- obtain IP and PTH, hydrate with LR @ 1cc/kg/hr  #Testicular CA s/p orchiectomy  - Holding home Testosterone 20.25 MG/ACT (1.62%) Transdermal Gel (AndroGel Pump); APPLY TWO PUMP PRESSES ONE TIME DAILY AS DIRECTED MDD:2 pumps    HEME/ONC:   - DVT prophylaxis-heparin   Injectable, SCDs    Labs: Hb/Hct:  13.8/41              Plts:  106              PTT/INR:  32.9/0.98  --->       ID:  WBC- 9.5  Temp trend- 24hrs T(F): 97.8 (09-04 @ 23:25), Max: 97.8 (09-04 @ 23:25)  Antibiotics- none           ENDO:  No chronic Hx  - FS q4 while NPO  - HA1C 5.5%    LINES/DRAINS:  PIV, Moses     DISPO:    SICU     Assessment & Plan  87y Male w/ Closed loop bowel obstruction     NEURO:  Acute pain-controlled with IV Tylenol    RESP:   - Saturating well on RA   - Encourage IS  - AM CXR  - Activity- Ambulate as tolerated        CARDS:   #Hx HFrEF (15%) w/ AICD and PPM  - Continue Metoprolol 5 q6 IV (takes 100 BID at home)  - Holding home Lasix 40 PRN and Spironolactone 25mg MWF  - EF: 65% on repeat echo, trace MV regurg, mild TR   #Hx HTN  - Holding home losartan 100 daily  #Hx HLD  - Holding home Simvastatin 20 daily  #Lactic acidosis - resolved   - EKG: atrial sensed ventricular paced rhythm, biventricular pacemaker detected,   - Troponin neg x 3    GI/NUTR:   Closed loop bowel obstruction  - Diet: NPO  - GI Prophylaxis- PPI  - Bowel regimen- none  - NGT to LCWS    /RENAL:   - Monitor UO-moses in place  - LR @125    Labs:          BUN/Cr- 47/2.1  -->,  42/2.1  -->          Electrolytes-Na 145 // K 4.7 // Mg 2.0 //  Phos 3.2    #CKD stage 3 (baseline Cr 1.8)  - Nephro consult for MELINDA on CKD with need for CT with IV contrast- obtain IP and PTH, hydrate with LR @ 1cc/kg/hr      #Testicular CA s/p orchiectomy  - Holding home Testosterone 20.25 MG/ACT (1.62%) Transdermal Gel (AndroGel Pump); APPLY TWO PUMP PRESSES ONE TIME DAILY AS DIRECTED MDD:2 pumps    HEME/ONC:   - DVT prophylaxis-heparin   Injectable, SubCutaneous  5000 units/q8hr    Labs: Hb/Hct:  12.6/38.5              Plts:  91              PTT/INR:  32.9/0.98  --->       ID:  WBC- 9.25  Temp trend- 24hrs T(F): 97.8 (09-04 @ 23:25), Max: 97.8 (09-04 @ 23:25)  Antibiotics cefoTEfan IVPB - 1g IV, stop after x2 times         ENDO:  No chronic Hx  - FS q4 while NPO  - HA1C 5.5%    LINES/DRAINS:  PIV, Moses     DISPO:    SICU     Assessment & Plan  87y Male w/ Closed loop bowel obstruction     NEURO:  Acute pain-controlled with IV Tylenol    RESP:   - Saturating well on RA   - Encourage IS  - AM CXR  - Activity- Ambulate as tolerated        CARDS:   #Hx HFrEF (15%) w/ AICD and PPM  - Continue Metoprolol 5 q6 IV (takes 100 BID at home)  - Holding home Lasix 40 PRN and Spironolactone 25mg MWF  - EF: 65% on repeat echo, trace MV regurg, mild TR   #Hx HTN  - Holding home losartan 100 daily  #Hx HLD  - Holding home Simvastatin 20 daily  #Lactic acidosis - resolved   - EKG: atrial sensed ventricular paced rhythm, biventricular pacemaker detected,   - Troponin neg x 3    GI/NUTR:   Closed loop bowel obstruction  - Diet: NPO  - GI Prophylaxis- PPI  - Bowel regimen- none  - NGT to LCWS    /RENAL:   - Monitor UO-moses in place  - LR @125    Labs:          BUN/Cr- 47/2.1  -->,  42/2.1  -->          Electrolytes-Na 145 // K 4.7 // Mg 2.0 //  Phos 3.2    #CKD stage 3 (baseline Cr 1.8)  - Nephro consult for MELINDA on CKD       #Testicular CA s/p orchiectomy  - Holding home Testosterone 20.25 MG/ACT (1.62%) Transdermal Gel (AndroGel Pump); APPLY TWO PUMP PRESSES ONE TIME DAILY AS DIRECTED MDD:2 pumps    HEME/ONC:   - DVT prophylaxis-heparin   Injectable, SubCutaneous  5000 units/q8hr    Labs: Hb/Hct:  12.6/38.5              Plts:  91              PTT/INR:  32.9/0.98  --->       ID:  WBC- 9.25  Temp trend- 24hrs T(F): 97.8 (09-04 @ 23:25), Max: 97.8 (09-04 @ 23:25)  Antibiotics cefoTEfan IVPB - 1g IV, stop after x2 times         ENDO:  No chronic Hx  - FS q4 while NPO  - HA1C 5.5%    LINES/DRAINS:  PIV, Moses     DISPO:    SICU     Assessment & Plan  87y Male w/ Closed loop bowel obstruction     NEURO:  Acute pain-controlled with IV Tylenol    RESP:   - Saturating well on RA   - Encourage IS  - AM CXR  - Activity- Ambulate as tolerated        CARDS:   #Hx HFrEF (15%) w/ AICD and PPM  - Continue Metoprolol 5 q6 IV (takes 100 BID at home)  - Holding home Lasix 40 PRN and Spironolactone 25mg MWF  - EF: 65% on repeat echo, trace MV regurg, mild TR   #Hx HTN  - Holding home losartan 100 daily  #Hx HLD  - Holding home Simvastatin 20 daily  #Lactic acidosis - resolved   - EKG: atrial sensed ventricular paced rhythm, biventricular pacemaker detected,   - Troponin neg x 3    GI/NUTR:   Closed loop bowel obstruction  - Diet: NPO  - GI Prophylaxis- PPI  - Bowel regimen- none  - NGT to LCWS    /RENAL:   - Monitor UO-moses in place  - LR @125    Labs:          BUN/Cr- 47/2.1  -->,  42/2.1  -->          Electrolytes-Na 145 // K 4.7 // Mg 2.0 //  Phos 3.2    #CKD stage 3 (baseline Cr 1.8)  - Nephro consult : MELINDA on CKD 3A/SBO/HTN/ bladder obstruction due to mass/ SBO probably prerenal sp OR and removal of adhesions. Continue IVF LR to 100 cc per hoiur foloow UOP/sp repeat CT with contrast yesterday, follow BMP IP      #Testicular CA s/p orchiectomy  - Holding home Testosterone 20.25 MG/ACT (1.62%) Transdermal Gel (AndroGel Pump); APPLY TWO PUMP PRESSES ONE TIME DAILY AS DIRECTED MDD:2 pumps    HEME/ONC:   - DVT prophylaxis-heparin   Injectable, SubCutaneous  5000 units/q8hr    Labs: Hb/Hct:  12.6/38.5              Plts:  91              PTT/INR:  32.9/0.98  --->       ID:  WBC- 9.25  Temp trend- 24hrs T(F): 97.8 (09-04 @ 23:25), Max: 97.8 (09-04 @ 23:25)  Antibiotics cefoTEfan IVPB - 1g IV, stop after x2 times         ENDO:  No chronic Hx  - FS q4 while NPO  - HA1C 5.5%    LINES/DRAINS:  PIV, Moses     DISPO:    SICU     Assessment & Plan  87y Male w/ Closed loop bowel obstruction     NEURO:  Acute pain-controlled with IV Tylenol    RESP:   - Saturating well on RA   - Encourage IS  - AM CXR  - Activity- Ambulate as tolerated        CARDS:   #Hx HFrEF (15%) w/ AICD and PPM  - Continue Metoprolol 5 q6 IV (takes 100 BID at home)  - Holding home Lasix 40 PRN and Spironolactone 25mg MWF  - EF: 65% on repeat echo, trace MV regurg, mild TR   #Hx HTN  - Holding home losartan 100 daily  #Hx HLD  - Holding home Simvastatin 20 daily  #Lactic acidosis - resolved   - EKG: atrial sensed ventricular paced rhythm, biventricular pacemaker detected,   - Troponin neg x 3    GI/NUTR:   Closed loop bowel obstruction s/p laparoscopic lysis of adhesions  - Diet: NPO  - GI Prophylaxis- PPI  - Bowel regimen- none  - NGT to LCWS  -await return of bowel function    /RENAL:   - discontinue Boss- TOV at 8;30pm (6hr)  - LR @125    Labs:          BUN/Cr- 47/2.1  -->,  42/2.1  -->          Electrolytes-Na 145 // K 4.7 // Mg 2.0 //  Phos 3.2    #CKD stage 3 (baseline Cr 1.8)  - Nephro consult : MELINDA on CKD 3A/SBO/HTN/ bladder obstruction due to mass/ SBO probably prerenal sp OR and removal of adhesions. Continue IVF LR to 100 cc per hour follow UOP/sp repeat CT with contrast yesterday, follow BMP IP      #Testicular CA s/p orchiectomy  - Holding home Testosterone 20.25 MG/ACT (1.62%) Transdermal Gel (AndroGel Pump); APPLY TWO PUMP PRESSES ONE TIME DAILY AS DIRECTED MDD:2 pumps    HEME/ONC:   - DVT prophylaxis-heparin   Injectable, SubCutaneous  5000 units/q8hr    Labs: Hb/Hct:  12.6/38.5              Plts:  91              PTT/INR:  32.9/0.98  --->       ID:  WBC- 9.25  Temp trend- 24hrs T(F): 97.8 (09-04 @ 23:25), Max: 97.8 (09-04 @ 23:25)  Antibiotics cefoTEfan IVPB - 1g IV, stop after x2 times         ENDO:  No chronic Hx  - FS q4 while NPO  - HA1C 5.5%    LINES/DRAINS:  Gera MARRERO     DISPO:    [facility]

## 2022-09-07 NOTE — BRIEF OPERATIVE NOTE - NSICDXBRIEFPROCEDURE_GEN_ALL_CORE_FT
PROCEDURES:  Diagnostic laparoscopy 07-Sep-2022 02:03:45  Robel Carvajal  Laparoscopic enterolysis 07-Sep-2022 02:03:58  Robel Carvajal

## 2022-09-07 NOTE — BRIEF OPERATIVE NOTE - OPERATION/FINDINGS
significant adhesions to anterior abdominal wall. After taking down the anterior adhesions the omentum was still stuck down. 2-3 thick adhesive bands were lysed in order to move the omentum cephalad at which time hemorrhagic bowel was able to then be moved out of the LUQ which appeared to be the site of the bowel obstruction. Bowel was seen peristalsing and began decompressing immediately and appeared viable so no bowel resection was done.

## 2022-09-07 NOTE — PROGRESS NOTE ADULT - SUBJECTIVE AND OBJECTIVE BOX
JAMEE SATTON  [id number]  87y Male    Indication for ICU admission: hemodynamic monitoring and frequent abdominal exams in the setting of closed loop bowel obstruction and medical comorbidities    Admit Date:09-04-22  ICU Date:09-04-22  OR Date:    No Known Allergies    PAST MEDICAL & SURGICAL HISTORY:  Chronic CHF (congestive heart failure)  (EF=15)    Dyslipidemia    CKD (chronic kidney disease) stage 3, GFR 30-59 ml/min  (baseline Creat=1.8)    HTN (hypertension)    H/O testicular cancer 1988    S/P implantation of automatic cardioverter/defibrillator (AICD)  History of pacemaker  Dyslipidemia  H/O excision of testicular mass (Bilateral with LN excision in 1988)      Home Medications:  AndroGel Pump 1.25 g/actuation:  (04 Sep 2022 12:07)  Lasix 40 mg oral tablet: 1 tab(s) orally once a day, As Needed (04 Sep 2022 12:07)  losartan 100 mg oral tablet: 1 tab(s) orally once a day (04 Sep 2022 12:07)  Metoprolol Tartrate 100 mg oral tablet: 1 tab(s) orally 2 times a day (04 Sep 2022 12:07)  simvastatin 20 mg oral tablet: 1 tab(s) orally once a day (at bedtime) (04 Sep 2022 12:07)  spironolactone 25 mg oral tablet: 1 tab(s) orally Monday, Wednesday, and Friday (04 Sep 2022 12:07)        24HRS EVENT:  9/6  Night  s/p OR, remained hemodynamically stable throughout case, extubated. BETTINA drain left in place, NGT to lcws.   periop cefotetan x2 more doses  strict NPO  interrogation pending with EP in am  pain improved s/p OR, patient comfortable    Day  NGT replaced- 400 out on insertion  CTA r/o ischemia   u/o 10cc, SBP soft, 500cc bolus   K improved   Nephro consult for MELINDA on CKD with need for CT with IV contrast- obtain IP and PTH, hydrate with LR @ 1cc/kg/hr  Pending OR         DVT PTX: HSQ    GI PTX:pantoprazole  Injectable 40 milliGRAM(s) IV Push daily      ***Tubes/Lines/Drains  ***  Peripheral IV  Urinary Catheter		Indication: Strict I&O    Date Placed:       REVIEW OF SYSTEMS    [x A ten-point review of systems was otherwise negative except as noted.  [ ] Due to altered mental status/intubation, subjective information were not able to be obtained from the patient. History was obtained, to the extent possible, from review of the chart and collateral sources of information.                 JAMEE STATON  [id number]  87y Male    Indication for ICU admission: hemodynamic monitoring and frequent abdominal exams in the setting of closed loop bowel obstruction and medical comorbidities    Admit Date:09-04-22  ICU Date:09-04-22  OR Date:    No Known Allergies    PAST MEDICAL & SURGICAL HISTORY:  Chronic CHF (congestive heart failure)  (EF=15)    Dyslipidemia    CKD (chronic kidney disease) stage 3, GFR 30-59 ml/min  (baseline Creat=1.8)    HTN (hypertension)    H/O testicular cancer 1988    S/P implantation of automatic cardioverter/defibrillator (AICD)  History of pacemaker  Dyslipidemia  H/O excision of testicular mass (Bilateral with LN excision in 1988)      Home Medications:  AndroGel Pump 1.25 g/actuation:  (04 Sep 2022 12:07)  Lasix 40 mg oral tablet: 1 tab(s) orally once a day, As Needed (04 Sep 2022 12:07)  losartan 100 mg oral tablet: 1 tab(s) orally once a day (04 Sep 2022 12:07)  Metoprolol Tartrate 100 mg oral tablet: 1 tab(s) orally 2 times a day (04 Sep 2022 12:07)  simvastatin 20 mg oral tablet: 1 tab(s) orally once a day (at bedtime) (04 Sep 2022 12:07)  spironolactone 25 mg oral tablet: 1 tab(s) orally Monday, Wednesday, and Friday (04 Sep 2022 12:07)        24HRS EVENT:  9/6  Night  s/p OR, remained hemodynamically stable throughout case, extubated. NGT to lcws.   periop cefotetan x2 more doses  strict NPO  interrogation pending with EP in am  pain improved s/p OR, patient comfortable    Day  NGT replaced- 400 out on insertion  CTA r/o ischemia   u/o 10cc, SBP soft, 500cc bolus   K improved   Nephro consult for MELINDA on CKD with need for CT with IV contrast- obtain IP and PTH, hydrate with LR @ 1cc/kg/hr  Pending OR         DVT PTX: HSQ    GI PTX:pantoprazole  Injectable 40 milliGRAM(s) IV Push daily      ***Tubes/Lines/Drains  ***  Peripheral IV  Urinary Catheter		Indication: Strict I&O    Date Placed:       REVIEW OF SYSTEMS    [x A ten-point review of systems was otherwise negative except as noted.  [ ] Due to altered mental status/intubation, subjective information were not able to be obtained from the patient. History was obtained, to the extent possible, from review of the chart and collateral sources of information.                 JAMEE STATON  [id number]  87y Male    Indication for ICU admission: hemodynamic monitoring and frequent abdominal exams in the setting of closed loop bowel obstruction and medical comorbidities    Admit Date:09-04-22  ICU Date:09-04-22  OR Date:    No Known Allergies    PAST MEDICAL & SURGICAL HISTORY:  Chronic CHF (congestive heart failure)  (EF=15)    Dyslipidemia    CKD (chronic kidney disease) stage 3, GFR 30-59 ml/min  (baseline Creat=1.8)    HTN (hypertension)    H/O testicular cancer 1988    S/P implantation of automatic cardioverter/defibrillator (AICD)  History of pacemaker  Dyslipidemia  H/O excision of testicular mass (Bilateral with LN excision in 1988)      Home Medications:  AndroGel Pump 1.25 g/actuation:  (04 Sep 2022 12:07)  Lasix 40 mg oral tablet: 1 tab(s) orally once a day, As Needed (04 Sep 2022 12:07)  losartan 100 mg oral tablet: 1 tab(s) orally once a day (04 Sep 2022 12:07)  Metoprolol Tartrate 100 mg oral tablet: 1 tab(s) orally 2 times a day (04 Sep 2022 12:07)  simvastatin 20 mg oral tablet: 1 tab(s) orally once a day (at bedtime) (04 Sep 2022 12:07)  spironolactone 25 mg oral tablet: 1 tab(s) orally Monday, Wednesday, and Friday (04 Sep 2022 12:07)        24HRS EVENT:  9/6  Night  s/p OR, remained hemodynamically stable throughout case, extubated. NGT to lcws.   periop cefotetan x2 more doses  strict NPO  interrogation pending with EP in am  pain improved s/p OR, patient comfortable    Day  NGT replaced- 400 out on insertion  CTA r/o ischemia   u/o 10cc, SBP soft, 500cc bolus   K improved   Nephro consult for MELINDA on CKD with need for CT with IV contrast- obtain IP and PTH, hydrate with LR @ 1cc/kg/hr  Pending OR         DVT PTX: HSQ    GI PTX:pantoprazole  Injectable 40 milliGRAM(s) IV Push daily      ***Tubes/Lines/Drains  ***  Peripheral IV  Urinary Catheter		Indication: Strict I&O    Date Placed:       REVIEW OF SYSTEMS    [x A ten-point review of systems was otherwise negative except as noted.  [ ] Due to altered mental status/intubation, subjective information were not able to be obtained from the patient. History was obtained, to the extent possible, from review of the chart and collateral sources of information.        Daily Height in cm: 177.8 (06 Sep 2022 17:48)    Daily     Diet, NPO (09-06-22 @ 23:23)      CURRENT MEDS:  Neurologic Medications  acetaminophen   IVPB .. 1000 milliGRAM(s) IV Intermittent once PRN Mild Pain (1 - 3)  ondansetron Injectable 4 milliGRAM(s) IV Push every 8 hours PRN Nausea and/or Vomiting    Respiratory Medications    Cardiovascular Medications  metoprolol tartrate Injectable 5 milliGRAM(s) IV Push every 6 hours    Gastrointestinal Medications  lactated ringers. 1000 milliLiter(s) IV Continuous <Continuous>  pantoprazole  Injectable 40 milliGRAM(s) IV Push daily    Genitourinary Medications    Hematologic/Oncologic Medications  heparin   Injectable 5000 Unit(s) SubCutaneous every 8 hours    Antimicrobial/Immunologic Medications  cefoTEtan  IVPB 1 Gram(s) IV Intermittent every 12 hours    Endocrine/Metabolic Medications    Topical/Other Medications  chlorhexidine 2% Cloths 1 Application(s) Topical <User Schedule>      ICU Vital Signs Last 24 Hrs  T(C): 36.7 (07 Sep 2022 08:00), Max: 36.8 (06 Sep 2022 17:30)  T(F): 98 (07 Sep 2022 08:00), Max: 98.2 (06 Sep 2022 17:30)  HR: 94 (07 Sep 2022 12:00) (89 - 106)  BP: 147/73 (07 Sep 2022 12:00) (115/54 - 177/82)  BP(mean): 103 (07 Sep 2022 12:00) (78 - 117)  ABP: 121/60 (07 Sep 2022 12:00) (113/58 - 176/74)  ABP(mean): 84 (07 Sep 2022 12:00) (81 - 108)  RR: 20 (07 Sep 2022 09:00) (18 - 28)  SpO2: 91% (07 Sep 2022 12:00) (90% - 100%)    O2 Parameters below as of 07 Sep 2022 08:00  Patient On (Oxygen Delivery Method): room air              I&O's Summary    06 Sep 2022 07:01  -  07 Sep 2022 07:00  --------------------------------------------------------  IN: 2800 mL / OUT: 2330 mL / NET: 470 mL    07 Sep 2022 07:01  -  07 Sep 2022 12:41  --------------------------------------------------------  IN: 750 mL / OUT: 490 mL / NET: 260 mL      I&O's Detail    06 Sep 2022 07:01  -  07 Sep 2022 07:00  --------------------------------------------------------  IN:    IV PiggyBack: 50 mL    Lactated Ringers: 1250 mL    Lactated Ringers: 1000 mL    Lactated Ringers Bolus: 500 mL  Total IN: 2800 mL    OUT:    Indwelling Catheter - Urethral (mL): 1355 mL    Nasogastric/Oral tube (mL): 975 mL  Total OUT: 2330 mL    Total NET: 470 mL              PHYSICAL EXAM:    General/Neuro  RASS:             GCS:     = E   / V   / M      Deficits:                             alert & oriented x 3, no focal deficits  Pupils:    Lungs:      clear to auscultation, Normal expansion/effort.     Cardiovascular : S1, S2.  Regular rate and rhythm.  Peripheral edema   Cardiac Rhythm: Normal Sinus Rhythm    GI: Abdomen soft, Non-tender, Non-distended.    Gastrostomy / Jejunostomy tube in place.  Nasogastric tube in place.  Colostomy / Ileostomy.    Wound:    Extremities: Extremities warm, pink, well-perfused. Pulses:Rt     Lt    Derm: Good skin turgor, no skin breakdown.      :       Boss catheter in place.      CXR:     LABS:  CAPILLARY BLOOD GLUCOSE      POCT Blood Glucose.: 90 mg/dL (07 Sep 2022 00:27)  POCT Blood Glucose.: 90 mg/dL (06 Sep 2022 17:52)                          12.6   9.25  )-----------( 91       ( 07 Sep 2022 05:59 )             38.5       09-07    142  |  105  |  36<H>  ----------------------------<  172<H>  4.7   |  24  |  2.4<H>    Ca    8.6      07 Sep 2022 05:59  Phos  2.9     09-07  Mg     1.9     09-07          CARDIAC MARKERS ( 07 Sep 2022 05:59 )  x     / <0.01 ng/mL / 113 U/L / x     / 3.4 ng/mL                   JAMEE STATON  [id number]  87y Male    Indication for ICU admission: hemodynamic monitoring and frequent abdominal exams in the setting of closed loop bowel obstruction and medical comorbidities    Admit Date:09-04-22  ICU Date:09-04-22  OR Date:    No Known Allergies    PAST MEDICAL & SURGICAL HISTORY:  Chronic CHF (congestive heart failure)  (EF=15)    Dyslipidemia    CKD (chronic kidney disease) stage 3, GFR 30-59 ml/min  (baseline Creat=1.8)    HTN (hypertension)    H/O testicular cancer 1988    S/P implantation of automatic cardioverter/defibrillator (AICD)  History of pacemaker  Dyslipidemia  H/O excision of testicular mass (Bilateral with LN excision in 1988)      Home Medications:  AndroGel Pump 1.25 g/actuation:  (04 Sep 2022 12:07)  Lasix 40 mg oral tablet: 1 tab(s) orally once a day, As Needed (04 Sep 2022 12:07)  losartan 100 mg oral tablet: 1 tab(s) orally once a day (04 Sep 2022 12:07)  Metoprolol Tartrate 100 mg oral tablet: 1 tab(s) orally 2 times a day (04 Sep 2022 12:07)  simvastatin 20 mg oral tablet: 1 tab(s) orally once a day (at bedtime) (04 Sep 2022 12:07)  spironolactone 25 mg oral tablet: 1 tab(s) orally Monday, Wednesday, and Friday (04 Sep 2022 12:07)        24HRS EVENT:  9/6  Night  s/p OR, remained hemodynamically stable throughout case, extubated.NGT to lcws.   periop cefotetan x2 more doses    strict NPO  interrogation pending with EP in am  pain improved s/p OR, patient comfortable    Day  NGT replaced- 400 out on insertion  CTA r/o ischemia   u/o 10cc, SBP soft, 500cc bolus   K improved   Nephro consult for MELINDA on CKD with need for CT with IV contrast- obtain IP and PTH, hydrate with LR @ 1cc/kg/hr  Pending OR         DVT PTX: HSQ    GI PTX:pantoprazole  Injectable 40 milliGRAM(s) IV Push daily      ***Tubes/Lines/Drains  ***  Peripheral IV  Urinary Catheter -> to be removed		    Indication: Strict I&O        REVIEW OF SYSTEMS    [x A ten-point review of systems was otherwise negative except as noted.  [ ] Due to altered mental status/intubation, subjective information were not able to be obtained from the patient. History was obtained, to the extent possible, from review of the chart and collateral sources of information.        Daily Height in cm: 177.8 (06 Sep 2022 17:48)    Daily     Diet, NPO (09-06-22 @ 23:23)      CURRENT MEDS:  Neurologic Medications  acetaminophen   IVPB .. 1000 milliGRAM(s) IV Intermittent once PRN Mild Pain (1 - 3)  ondansetron Injectable 4 milliGRAM(s) IV Push every 8 hours PRN Nausea and/or Vomiting    Cardiovascular Medications  metoprolol tartrate Injectable 5 milliGRAM(s) IV Push every 6 hours    Gastrointestinal Medications  lactated ringers. 1000 milliLiter(s) IV Continuous <Continuous>  pantoprazole  Injectable 40 milliGRAM(s) IV Push daily    Genitourinary Medications    Hematologic/Oncologic Medications  heparin   Injectable 5000 Unit(s) SubCutaneous every 8 hours    Antimicrobial/Immunologic Medications  cefoTEtan  IVPB 1 Gram(s) IV Intermittent every 12 hours    Endocrine/Metabolic Medications    Topical/Other Medications  chlorhexidine 2% Cloths 1 Application(s) Topical <User Schedule>      ICU Vital Signs Last 24 Hrs  T(C): 36.7 (07 Sep 2022 08:00), Max: 36.8 (06 Sep 2022 17:30)  T(F): 98 (07 Sep 2022 08:00), Max: 98.2 (06 Sep 2022 17:30)  HR: 94 (07 Sep 2022 12:00) (89 - 106)  BP: 147/73 (07 Sep 2022 12:00) (115/54 - 177/82)  BP(mean): 103 (07 Sep 2022 12:00) (78 - 117)  ABP: 121/60 (07 Sep 2022 12:00) (113/58 - 176/74)  ABP(mean): 84 (07 Sep 2022 12:00) (81 - 108)  RR: 20 (07 Sep 2022 09:00) (18 - 28)  SpO2: 91% (07 Sep 2022 12:00) (90% - 100%)    O2 Parameters below as of 07 Sep 2022 08:00  Patient On (Oxygen Delivery Method): room air              I&O's Summary    06 Sep 2022 07:01  -  07 Sep 2022 07:00  --------------------------------------------------------  IN: 2800 mL / OUT: 2330 mL / NET: 470 mL    07 Sep 2022 07:01  -  07 Sep 2022 12:41  --------------------------------------------------------  IN: 750 mL / OUT: 490 mL / NET: 260 mL      I&O's Detail    06 Sep 2022 07:01  -  07 Sep 2022 07:00  --------------------------------------------------------  IN:    IV PiggyBack: 50 mL    Lactated Ringers: 1250 mL    Lactated Ringers: 1000 mL    Lactated Ringers Bolus: 500 mL  Total IN: 2800 mL    OUT:    Indwelling Catheter - Urethral (mL): 1355 mL    Nasogastric/Oral tube (mL): 975 mL  Total OUT: 2330 mL    Total NET: 470 mL              PHYSICAL EXAM:    General/Neuro: No repiratory distress  RASS:             GCS:     = E   / V   / M      Nervous:  alert & oriented x 3, no focal deficits  Pupils: PERRLA  Lungs: clear to auscultation, Normal expansion/effort.   Cardiovascular : S1, S2.  Regular rate and rhythm. No Peripheral edema, DP pulses 2+ b/l  Cardiac Rhythm: Normal Sinus Rhythm  GI: Abdomen soft, Non-tender, Non-distended. Incisions look clean and dry, no oozing, erythema, no tenderness.  Gastrostomy / Jejunostomy tube in place.  Nasogastric tube in place.    Extremities: Extremities warm, pink, well-perfused.  Pulses: Rt  DP2+   Lt DP2+. PT2    Derm: Good skin turgor, no skin breakdown.      :       Boss catheter in place.      CXR:     LABS:  CAPILLARY BLOOD GLUCOSE      POCT Blood Glucose.: 90 mg/dL (07 Sep 2022 00:27)  POCT Blood Glucose.: 90 mg/dL (06 Sep 2022 17:52)                          12.6   9.25  )-----------( 91       ( 07 Sep 2022 05:59 )             38.5       09-07    142  |  105  |  36<H>  ----------------------------<  172<H>  4.7   |  24  |  2.4<H>    Ca    8.6      07 Sep 2022 05:59  Phos  2.9     09-07  Mg     1.9     09-07          CARDIAC MARKERS ( 07 Sep 2022 05:59 )  x     / <0.01 ng/mL / 113 U/L / x     / 3.4 ng/mL                   JAMEE STATON  [id number]  87y Male    Indication for ICU admission: hemodynamic monitoring and frequent abdominal exams in the setting of closed loop bowel obstruction and medical comorbidities    Admit Date:09-04-22  ICU Date:09-04-22  OR Date:    No Known Allergies    PAST MEDICAL & SURGICAL HISTORY:  Chronic CHF (congestive heart failure)  (EF=15)    Dyslipidemia    CKD (chronic kidney disease) stage 3, GFR 30-59 ml/min  (baseline Creat=1.8)    HTN (hypertension)    H/O testicular cancer 1988    S/P implantation of automatic cardioverter/defibrillator (AICD)  History of pacemaker  Dyslipidemia  H/O excision of testicular mass (Bilateral with LN excision in 1988)      Home Medications:  AndroGel Pump 1.25 g/actuation:  (04 Sep 2022 12:07)  Lasix 40 mg oral tablet: 1 tab(s) orally once a day, As Needed (04 Sep 2022 12:07)  losartan 100 mg oral tablet: 1 tab(s) orally once a day (04 Sep 2022 12:07)  Metoprolol Tartrate 100 mg oral tablet: 1 tab(s) orally 2 times a day (04 Sep 2022 12:07)  simvastatin 20 mg oral tablet: 1 tab(s) orally once a day (at bedtime) (04 Sep 2022 12:07)  spironolactone 25 mg oral tablet: 1 tab(s) orally Monday, Wednesday, and Friday (04 Sep 2022 12:07)        24HRS EVENT:  9/6  Night  s/p OR, remained hemodynamically stable throughout case, extubated.NGT to lcws.   periop cefotetan x2 more doses    strict NPO  interrogation pending with EP in am  pain improved s/p OR, patient comfortable    Day  NGT replaced- 400 out on insertion  CTA r/o ischemia   u/o 10cc, SBP soft, 500cc bolus   K improved   Nephro consult for MELINDA on CKD with need for CT with IV contrast- obtain IP and PTH, hydrate with LR @ 1cc/kg/hr  Pending OR     Day 9/7  s/p OR, hemorraghic bowel moved out of LUQ, peristalsis appreciated during surgery no bowel resection  Strict NPO  Indwelling catheter to be removed to avoid UTI  Ambulate as tolerated  No gas passed yet  Nephro consult, GFR 30-> 26    DVT PTX: HSQ    GI PTX:pantoprazole  Injectable 40 milliGRAM(s) IV Push daily      ***Tubes/Lines/Drains  ***  Peripheral IV  Urinary Catheter -> to be removed		    Indication: Strict I&O        REVIEW OF SYSTEMS    [x A ten-point review of systems was otherwise negative except as noted.  [ ] Due to altered mental status/intubation, subjective information were not able to be obtained from the patient. History was obtained, to the extent possible, from review of the chart and collateral sources of information.        Daily Height in cm: 177.8 (06 Sep 2022 17:48)    Daily     Diet, NPO (09-06-22 @ 23:23)      CURRENT MEDS:  Neurologic Medications  acetaminophen   IVPB .. 1000 milliGRAM(s) IV Intermittent once PRN Mild Pain (1 - 3)  ondansetron Injectable 4 milliGRAM(s) IV Push every 8 hours PRN Nausea and/or Vomiting    Cardiovascular Medications  metoprolol tartrate Injectable 5 milliGRAM(s) IV Push every 6 hours    Gastrointestinal Medications  lactated ringers. 1000 milliLiter(s) IV Continuous <Continuous>  pantoprazole  Injectable 40 milliGRAM(s) IV Push daily    Genitourinary Medications    Hematologic/Oncologic Medications  heparin   Injectable 5000 Unit(s) SubCutaneous every 8 hours    Antimicrobial/Immunologic Medications  cefoTEtan  IVPB 1 Gram(s) IV Intermittent every 12 hours    Endocrine/Metabolic Medications    Topical/Other Medications  chlorhexidine 2% Cloths 1 Application(s) Topical <User Schedule>      ICU Vital Signs Last 24 Hrs  T(C): 36.7 (07 Sep 2022 08:00), Max: 36.8 (06 Sep 2022 17:30)  T(F): 98 (07 Sep 2022 08:00), Max: 98.2 (06 Sep 2022 17:30)  HR: 94 (07 Sep 2022 12:00) (89 - 106)  BP: 147/73 (07 Sep 2022 12:00) (115/54 - 177/82)  BP(mean): 103 (07 Sep 2022 12:00) (78 - 117)  ABP: 121/60 (07 Sep 2022 12:00) (113/58 - 176/74)  ABP(mean): 84 (07 Sep 2022 12:00) (81 - 108)  RR: 20 (07 Sep 2022 09:00) (18 - 28)  SpO2: 91% (07 Sep 2022 12:00) (90% - 100%)    O2 Parameters below as of 07 Sep 2022 08:00  Patient On (Oxygen Delivery Method): room air              I&O's Summary    06 Sep 2022 07:01  -  07 Sep 2022 07:00  --------------------------------------------------------  IN: 2800 mL / OUT: 2330 mL / NET: 470 mL    07 Sep 2022 07:01  -  07 Sep 2022 12:41  --------------------------------------------------------  IN: 750 mL / OUT: 490 mL / NET: 260 mL      I&O's Detail    06 Sep 2022 07:01  -  07 Sep 2022 07:00  --------------------------------------------------------  IN:    IV PiggyBack: 50 mL    Lactated Ringers: 1250 mL    Lactated Ringers: 1000 mL    Lactated Ringers Bolus: 500 mL  Total IN: 2800 mL    OUT:    Indwelling Catheter - Urethral (mL): 1355 mL    Nasogastric/Oral tube (mL): 975 mL  Total OUT: 2330 mL    Total NET: 470 mL              PHYSICAL EXAM:    General/Neuro: No repiratory distress  RASS:             GCS:     = E   / V   / M      Nervous:  alert & oriented x 3, no focal deficits  Pupils: PERRLA  Lungs: clear to auscultation, Normal expansion/effort.   Cardiovascular : S1, S2.  Regular rate and rhythm. No Peripheral edema, DP pulses 2+ b/l  Cardiac Rhythm: Normal Sinus Rhythm  GI: Abdomen soft, Non-tender, Non-distended. Incisions look clean and dry, no oozing, erythema, no tenderness.  Gastrostomy / Jejunostomy tube in place.  Nasogastric tube in place.    Extremities: Extremities warm, pink, well-perfused.  Pulses: Rt  DP2+   Lt DP2+. PT2    Derm: Good skin turgor, no skin breakdown.      :       Boss catheter in place.      CXR:     LABS:  CAPILLARY BLOOD GLUCOSE      POCT Blood Glucose.: 90 mg/dL (07 Sep 2022 00:27)  POCT Blood Glucose.: 90 mg/dL (06 Sep 2022 17:52)                          12.6   9.25  )-----------( 91       ( 07 Sep 2022 05:59 )             38.5       09-07    142  |  105  |  36<H>  ----------------------------<  172<H>  4.7   |  24  |  2.4<H>    Ca    8.6      07 Sep 2022 05:59  Phos  2.9     09-07  Mg     1.9     09-07          CARDIAC MARKERS ( 07 Sep 2022 05:59 )  x     / <0.01 ng/mL / 113 U/L / x     / 3.4 ng/mL                   JAMEE STATON  [id number]  87y Male    Indication for ICU admission: hemodynamic monitoring and frequent abdominal exams in the setting of closed loop bowel obstruction and medical comorbidities    Admit Date:09-04-22  ICU Date:09-04-22  OR Date:    No Known Allergies    PAST MEDICAL & SURGICAL HISTORY:  Chronic CHF (congestive heart failure)  (EF=15)    Dyslipidemia    CKD (chronic kidney disease) stage 3, GFR 30-59 ml/min  (baseline Creat=1.8)    HTN (hypertension)    H/O testicular cancer 1988    S/P implantation of automatic cardioverter/defibrillator (AICD)  History of pacemaker  Dyslipidemia  H/O excision of testicular mass (Bilateral with LN excision in 1988)      Home Medications:  AndroGel Pump 1.25 g/actuation:  (04 Sep 2022 12:07)  Lasix 40 mg oral tablet: 1 tab(s) orally once a day, As Needed (04 Sep 2022 12:07)  losartan 100 mg oral tablet: 1 tab(s) orally once a day (04 Sep 2022 12:07)  Metoprolol Tartrate 100 mg oral tablet: 1 tab(s) orally 2 times a day (04 Sep 2022 12:07)  simvastatin 20 mg oral tablet: 1 tab(s) orally once a day (at bedtime) (04 Sep 2022 12:07)  spironolactone 25 mg oral tablet: 1 tab(s) orally Monday, Wednesday, and Friday (04 Sep 2022 12:07)        24HRS EVENT:  9/6  Night  s/p OR, remained hemodynamically stable throughout case, extubated.NGT to lcws.   periop cefotetan x2 more doses    strict NPO  interrogation pending with EP in am  pain improved s/p OR, patient comfortable    Day  NGT replaced- 400 out on insertion  CTA r/o ischemia   u/o 10cc, SBP soft, 500cc bolus   K improved   Nephro consult for MELINDA on CKD with need for CT with IV contrast- obtain IP and PTH, hydrate with LR @ 1cc/kg/hr  Pending OR     Day 9/7  s/p OR, hemorraghic bowel moved out of LUQ, peristalsis appreciated during surgery no bowel resection  Strict NPO  Indwelling catheter to be removed to avoid UTI  Ambulate as tolerated  No gas passed yet  Nephro consult, GFR 30-> 26    DVT PTX: HSQ    GI PTX:pantoprazole  Injectable 40 milliGRAM(s) IV Push daily      ***Tubes/Lines/Drains  ***  Peripheral IV  Urinary Catheter -> to be removed		    Indication: Strict I&O        REVIEW OF SYSTEMS    [x A ten-point review of systems was otherwise negative except as noted.  [ ] Due to altered mental status/intubation, subjective information were not able to be obtained from the patient. History was obtained, to the extent possible, from review of the chart and collateral sources of information.        Daily Height in cm: 177.8 (06 Sep 2022 17:48)    Daily     Diet, NPO (09-06-22 @ 23:23)      CURRENT MEDS:  Neurologic Medications  acetaminophen   IVPB .. 1000 milliGRAM(s) IV Intermittent once PRN Mild Pain (1 - 3)  ondansetron Injectable 4 milliGRAM(s) IV Push every 8 hours PRN Nausea and/or Vomiting    Cardiovascular Medications  metoprolol tartrate Injectable 5 milliGRAM(s) IV Push every 6 hours    Gastrointestinal Medications  lactated ringers. 1000 milliLiter(s) IV Continuous <Continuous>  pantoprazole  Injectable 40 milliGRAM(s) IV Push daily    Genitourinary Medications    Hematologic/Oncologic Medications  heparin   Injectable 5000 Unit(s) SubCutaneous every 8 hours    Antimicrobial/Immunologic Medications  cefoTEtan  IVPB 1 Gram(s) IV Intermittent every 12 hours    Endocrine/Metabolic Medications    Topical/Other Medications  chlorhexidine 2% Cloths 1 Application(s) Topical <User Schedule>      ICU Vital Signs Last 24 Hrs  T(C): 36.7 (07 Sep 2022 08:00), Max: 36.8 (06 Sep 2022 17:30)  T(F): 98 (07 Sep 2022 08:00), Max: 98.2 (06 Sep 2022 17:30)  HR: 94 (07 Sep 2022 12:00) (89 - 106)  BP: 147/73 (07 Sep 2022 12:00) (115/54 - 177/82)  BP(mean): 103 (07 Sep 2022 12:00) (78 - 117)  ABP: 121/60 (07 Sep 2022 12:00) (113/58 - 176/74)  ABP(mean): 84 (07 Sep 2022 12:00) (81 - 108)  RR: 20 (07 Sep 2022 09:00) (18 - 28)  SpO2: 91% (07 Sep 2022 12:00) (90% - 100%)    O2 Parameters below as of 07 Sep 2022 08:00  Patient On (Oxygen Delivery Method): room air              I&O's Summary    06 Sep 2022 07:01  -  07 Sep 2022 07:00  --------------------------------------------------------  IN: 2800 mL / OUT: 2330 mL / NET: 470 mL    07 Sep 2022 07:01  -  07 Sep 2022 12:41  --------------------------------------------------------  IN: 750 mL / OUT: 490 mL / NET: 260 mL      I&O's Detail    06 Sep 2022 07:01  -  07 Sep 2022 07:00  --------------------------------------------------------  IN:    IV PiggyBack: 50 mL    Lactated Ringers: 1250 mL    Lactated Ringers: 1000 mL    Lactated Ringers Bolus: 500 mL  Total IN: 2800 mL    OUT:    Indwelling Catheter - Urethral (mL): 1355 mL    Nasogastric/Oral tube (mL): 975 mL  Total OUT: 2330 mL    Total NET: 470 mL              PHYSICAL EXAM:    General/Neuro: Well nourished, appears stated age, grooming and hygiene appropriate for the context   No repiratory distress          Neuro:  alert & oriented x 3, no focal deficits, cranial  nerves II-XII grossly intact  Pupils: PERRLA EOM intact.  Lungs: clear to auscultation, Normal expansion/effort.   Cardiovascular : S1, S2.  Regular rate and rhythm. No Peripheral edema, DP pulses 2+ b/l  Cardiac Rhythm: Normal Sinus Rhythm  GI: Abdomen soft, Non-tender, Non-distended. Incisions look clean and dry, no oozing, erythema, no tenderness.  Gastrostomy / Jejunostomy tube in place.  Nasogastric tube in place.    Extremities: Extremities warm, pink, well-perfused.  Derm: Good skin turgor, no skin breakdown.    :    Boss catheter in place, no lesions, hernias, discharge.  Psych: Cooperative, linear thought process, makes good eye contact      CXR:     LABS:  CAPILLARY BLOOD GLUCOSE      POCT Blood Glucose.: 90 mg/dL (07 Sep 2022 00:27)  POCT Blood Glucose.: 90 mg/dL (06 Sep 2022 17:52)                          12.6   9.25  )-----------( 91       ( 07 Sep 2022 05:59 )             38.5       09-07    142  |  105  |  36<H>  ----------------------------<  172<H>  4.7   |  24  |  2.4<H>    Ca    8.6      07 Sep 2022 05:59  Phos  2.9     09-07  Mg     1.9     09-07          CARDIAC MARKERS ( 07 Sep 2022 05:59 )  x     / <0.01 ng/mL / 113 U/L / x     / 3.4 ng/mL                   JAMEE STATON  [id number]  87y Male    Indication for ICU admission: hemodynamic monitoring and frequent abdominal exams in the setting of closed loop bowel obstruction and medical comorbidities    Admit Date:09-04-22  ICU Date:09-04-22  OR Date:    No Known Allergies    PAST MEDICAL & SURGICAL HISTORY:  Chronic CHF (congestive heart failure)  (EF=15)    Dyslipidemia    CKD (chronic kidney disease) stage 3, GFR 30-59 ml/min  (baseline Creat=1.8)    HTN (hypertension)    H/O testicular cancer 1988    S/P implantation of automatic cardioverter/defibrillator (AICD)  History of pacemaker  Dyslipidemia  H/O excision of testicular mass (Bilateral with LN excision in 1988)      Home Medications:  AndroGel Pump 1.25 g/actuation:  (04 Sep 2022 12:07)  Lasix 40 mg oral tablet: 1 tab(s) orally once a day, As Needed (04 Sep 2022 12:07)  losartan 100 mg oral tablet: 1 tab(s) orally once a day (04 Sep 2022 12:07)  Metoprolol Tartrate 100 mg oral tablet: 1 tab(s) orally 2 times a day (04 Sep 2022 12:07)  simvastatin 20 mg oral tablet: 1 tab(s) orally once a day (at bedtime) (04 Sep 2022 12:07)  spironolactone 25 mg oral tablet: 1 tab(s) orally Monday, Wednesday, and Friday (04 Sep 2022 12:07)        24HRS EVENT:  9/6  Night  s/p OR, remained hemodynamically stable throughout case, extubated.NGT to [facility].   periop cefotetan x2 more doses    strict NPO  interrogation pending with EP in am  pain improved s/p OR, patient comfortable    Day  NGT replaced- 400 out on insertion  CTA r/o ischemia   u/o 10cc, SBP soft, 500cc bolus   K improved   Nephro consult for MELINDA on CKD with need for CT with IV contrast- obtain IP and PTH, hydrate with LR @ 1cc/kg/hr  Pending OR       DVT PTX: HSQ    GI PTX:pantoprazole  Injectable 40 milliGRAM(s) IV Push daily      ***Tubes/Lines/Drains  ***  Peripheral IV  Urinary Catheter -> to be removed		    Indication: Strict I&O        REVIEW OF SYSTEMS    [x A ten-point review of systems was otherwise negative except as noted.  [ ] Due to altered mental status/intubation, subjective information were not able to be obtained from the patient. History was obtained, to the extent possible, from review of the chart and collateral sources of information.        Daily Height in cm: 177.8 (06 Sep 2022 17:48)    Daily     Diet, NPO (09-06-22 @ 23:23)      CURRENT MEDS:  Neurologic Medications  acetaminophen   IVPB .. 1000 milliGRAM(s) IV Intermittent once PRN Mild Pain (1 - 3)  ondansetron Injectable 4 milliGRAM(s) IV Push every 8 hours PRN Nausea and/or Vomiting    Cardiovascular Medications  metoprolol tartrate Injectable 5 milliGRAM(s) IV Push every 6 hours    Gastrointestinal Medications  lactated ringers. 1000 milliLiter(s) IV Continuous <Continuous>  pantoprazole  Injectable 40 milliGRAM(s) IV Push daily    Genitourinary Medications    Hematologic/Oncologic Medications  heparin   Injectable 5000 Unit(s) SubCutaneous every 8 hours    Antimicrobial/Immunologic Medications  cefoTEtan  IVPB 1 Gram(s) IV Intermittent every 12 hours    Endocrine/Metabolic Medications    Topical/Other Medications  chlorhexidine 2% Cloths 1 Application(s) Topical <User Schedule>      ICU Vital Signs Last 24 Hrs  T(C): 36.7 (07 Sep 2022 08:00), Max: 36.8 (06 Sep 2022 17:30)  T(F): 98 (07 Sep 2022 08:00), Max: 98.2 (06 Sep 2022 17:30)  HR: 94 (07 Sep 2022 12:00) (89 - 106)  BP: 147/73 (07 Sep 2022 12:00) (115/54 - 177/82)  BP(mean): 103 (07 Sep 2022 12:00) (78 - 117)  ABP: 121/60 (07 Sep 2022 12:00) (113/58 - 176/74)  ABP(mean): 84 (07 Sep 2022 12:00) (81 - 108)  RR: 20 (07 Sep 2022 09:00) (18 - 28)  SpO2: 91% (07 Sep 2022 12:00) (90% - 100%)    O2 Parameters below as of 07 Sep 2022 08:00  Patient On (Oxygen Delivery Method): room air              I&O's Summary    06 Sep 2022 07:01  -  07 Sep 2022 07:00  --------------------------------------------------------  IN: 2800 mL / OUT: 2330 mL / NET: 470 mL    07 Sep 2022 07:01  -  07 Sep 2022 12:41  --------------------------------------------------------  IN: 750 mL / OUT: 490 mL / NET: 260 mL      I&O's Detail    06 Sep 2022 07:01  -  07 Sep 2022 07:00  --------------------------------------------------------  IN:    IV PiggyBack: 50 mL    Lactated Ringers: 1250 mL    Lactated Ringers: 1000 mL    Lactated Ringers Bolus: 500 mL  Total IN: 2800 mL    OUT:    Indwelling Catheter - Urethral (mL): 1355 mL    Nasogastric/Oral tube (mL): 975 mL  Total OUT: 2330 mL    Total NET: 470 mL              PHYSICAL EXAM:    General/Neuro: Well nourished, appears stated age, grooming and hygiene appropriate for the context   No repiratory distress          Neuro:  alert & oriented x 3, no focal deficits, cranial  nerves II-XII grossly intact  Pupils: PERRLA EOM intact.  Lungs: clear to auscultation, Normal expansion/effort.   Cardiovascular : S1, S2.  Regular rate and rhythm. No Peripheral edema, DP pulses 2+ b/l  Cardiac Rhythm: Normal Sinus Rhythm  GI: Abdomen soft, Non-tender, Non-distended. Incisions look clean and dry, no oozing, erythema, no tenderness.  Gastrostomy / Jejunostomy tube in place.  Nasogastric tube in place.    Extremities: Extremities warm, pink, well-perfused.  Derm: Good skin turgor, no skin breakdown.    :    Boss catheter in place, no lesions, hernias, discharge.  Psych: Cooperative, linear thought process, makes good eye contact      CXR:     LABS:  CAPILLARY BLOOD GLUCOSE      POCT Blood Glucose.: 90 mg/dL (07 Sep 2022 00:27)  POCT Blood Glucose.: 90 mg/dL (06 Sep 2022 17:52)                          12.6   9.25  )-----------( 91       ( 07 Sep 2022 05:59 )             38.5       09-07    142  |  105  |  36<H>  ----------------------------<  172<H>  4.7   |  24  |  2.4<H>    Ca    8.6      07 Sep 2022 05:59  Phos  2.9     09-07  Mg     1.9     09-07          CARDIAC MARKERS ( 07 Sep 2022 05:59 )  x     / <0.01 ng/mL / 113 U/L / x     / 3.4 ng/mL                   JAMEE STATON  [id number]  87y Male    Indication for ICU admission: hemodynamic monitoring and frequent abdominal exams in the setting of closed loop bowel obstruction and medical comorbidities    Admit Date:09-04-22  ICU Date:09-04-22  OR Date:    No Known Allergies    PAST MEDICAL & SURGICAL HISTORY:  Chronic CHF (congestive heart failure)  (EF=15)    Dyslipidemia    CKD (chronic kidney disease) stage 3, GFR 30-59 ml/min  (baseline Creat=1.8)    HTN (hypertension)    H/O testicular cancer 1988    S/P implantation of automatic cardioverter/defibrillator (AICD)  History of pacemaker  Dyslipidemia  H/O excision of testicular mass (Bilateral with LN excision in 1988)      Home Medications:  AndroGel Pump 1.25 g/actuation:  (04 Sep 2022 12:07)  Lasix 40 mg oral tablet: 1 tab(s) orally once a day, As Needed (04 Sep 2022 12:07)  losartan 100 mg oral tablet: 1 tab(s) orally once a day (04 Sep 2022 12:07)  Metoprolol Tartrate 100 mg oral tablet: 1 tab(s) orally 2 times a day (04 Sep 2022 12:07)  simvastatin 20 mg oral tablet: 1 tab(s) orally once a day (at bedtime) (04 Sep 2022 12:07)  spironolactone 25 mg oral tablet: 1 tab(s) orally Monday, Wednesday, and Friday (04 Sep 2022 12:07)        24HRS EVENT:  9/6  Night  s/p OR, remained hemodynamically stable throughout case, extubated.NGT to [facility].   periop cefotetan x2 more doses    strict NPO  interrogation pending with EP in am  pain improved s/p OR, patient comfortable    Day  NGT replaced- 400 out on insertion  CTA r/o ischemia   u/o 10cc, SBP soft, 500cc bolus   K improved   Nephro consult for MELINDA on CKD with need for CT with IV contrast- obtain IP and PTH, hydrate with LR @ 1cc/kg/hr  Pending OR       DVT PTX: HSQ    GI PTX:pantoprazole  Injectable 40 milliGRAM(s) IV Push daily      ***Tubes/Lines/Drains  ***  Peripheral IV  Urinary Catheter -> to be removed		    Indication: Strict I&O        REVIEW OF SYSTEMS    [x A ten-point review of systems was otherwise negative except as noted.  [ ] Due to altered mental status/intubation, subjective information were not able to be obtained from the patient. History was obtained, to the extent possible, from review of the chart and collateral sources of information.        Daily Height in cm: 177.8 (06 Sep 2022 17:48)      Diet, NPO (09-06-22 @ 23:23)      CURRENT MEDS:  Neurologic Medications  acetaminophen   IVPB .. 1000 milliGRAM(s) IV Intermittent once PRN Mild Pain (1 - 3)  ondansetron Injectable 4 milliGRAM(s) IV Push every 8 hours PRN Nausea and/or Vomiting    Cardiovascular Medications  metoprolol tartrate Injectable 5 milliGRAM(s) IV Push every 6 hours    Gastrointestinal Medications  lactated ringers. 1000 milliLiter(s) IV Continuous <Continuous>  pantoprazole  Injectable 40 milliGRAM(s) IV Push daily    Genitourinary Medications    Hematologic/Oncologic Medications  heparin   Injectable 5000 Unit(s) SubCutaneous every 8 hours    Antimicrobial/Immunologic Medications  cefoTEtan  IVPB 1 Gram(s) IV Intermittent every 12 hours x 2times    Endocrine/Metabolic Medications    Topical/Other Medications  chlorhexidine 2% Cloths 1 Application(s) Topical <User Schedule>      ICU Vital Signs Last 24 Hrs  T(C): 36.7 (07 Sep 2022 08:00), Max: 36.8 (06 Sep 2022 17:30)  T(F): 98 (07 Sep 2022 08:00), Max: 98.2 (06 Sep 2022 17:30)  HR: 94 (07 Sep 2022 12:00) (89 - 106)  BP: 147/73 (07 Sep 2022 12:00) (115/54 - 177/82)  BP(mean): 103 (07 Sep 2022 12:00) (78 - 117)  ABP: 121/60 (07 Sep 2022 12:00) (113/58 - 176/74)  ABP(mean): 84 (07 Sep 2022 12:00) (81 - 108)  RR: 20 (07 Sep 2022 09:00) (18 - 28)  SpO2: 91% (07 Sep 2022 12:00) (90% - 100%)    O2 Parameters below as of 07 Sep 2022 08:00  Patient On (Oxygen Delivery Method): room air              I&O's Summary    06 Sep 2022 07:01  -  07 Sep 2022 07:00  --------------------------------------------------------  IN: 2800 mL / OUT: 2330 mL / NET: 470 mL    07 Sep 2022 07:01  -  07 Sep 2022 12:41  --------------------------------------------------------  IN: 750 mL / OUT: 490 mL / NET: 260 mL      I&O's Detail    06 Sep 2022 07:01  -  07 Sep 2022 07:00  --------------------------------------------------------  IN:    IV PiggyBack: 50 mL    Lactated Ringers: 1250 mL    Lactated Ringers: 1000 mL    Lactated Ringers Bolus: 500 mL  Total IN: 2800 mL    OUT:    Indwelling Catheter - Urethral (mL): 1355 mL    Nasogastric/Oral tube (mL): 975 mL  Total OUT: 2330 mL    Total NET: 470 ml    PHYSICAL EXAM:    General/Neuro: Well nourished, appears stated age, grooming and hygiene appropriate for the context   No repiratory distress          Neuro:  alert & oriented x 3, no focal deficits, cranial  nerves II-XII grossly intact  Pupils: PERRLA EOM intact.  Lungs: clear to auscultation, Normal expansion/effort.   Cardiovascular : S1, S2.  Regular rate and rhythm. No Peripheral edema, DP pulses 2+ b/l  Cardiac Rhythm: Normal Sinus Rhythm  GI: Abdomen soft, Non-tender, Non-distended. Incisions look clean and dry, no oozing, erythema, no tenderness.  Gastrostomy / Jejunostomy tube in place.  Nasogastric tube in place.    Extremities: Extremities warm, pink, well-perfused.  Derm: Good skin turgor, no skin breakdown.    :    Boss catheter in place, no lesions, hernias, discharge.  Psych: Cooperative, linear thought process, makes good eye contact      CXR: pending    LABS:  CAPILLARY BLOOD GLUCOSE      POCT Blood Glucose.: 90 mg/dL (07 Sep 2022 00:27)  POCT Blood Glucose.: 90 mg/dL (06 Sep 2022 17:52)                          12.6   9.25  )-----------( 91       ( 07 Sep 2022 05:59 )             38.5       09-07    142  |  105  |  36<H>  ----------------------------<  172<H>  4.7   |  24  |  2.4<H>    Ca    8.6      07 Sep 2022 05:59  Phos  2.9     09-07  Mg     1.9     09-07          CARDIAC MARKERS ( 07 Sep 2022 05:59 )  x     / <0.01 ng/mL / 113 U/L / x     / 3.4 ng/mL                   JAMEE STATON  [id number]  87y Male    Indication for ICU admission: hemodynamic monitoring and frequent abdominal exams in the setting of closed loop bowel obstruction and medical comorbidities    Admit Date:09-04-22  ICU Date:09-04-22  OR Date:    No Known Allergies    PAST MEDICAL & SURGICAL HISTORY:  Chronic CHF (congestive heart failure)  (EF=15)    Dyslipidemia    CKD (chronic kidney disease) stage 3, GFR 30-59 ml/min  (baseline Creat=1.8)    HTN (hypertension)    H/O testicular cancer 1988    S/P implantation of automatic cardioverter/defibrillator (AICD)  History of pacemaker  Dyslipidemia  H/O excision of testicular mass (Bilateral with LN excision in 1988)      Home Medications:  AndroGel Pump 1.25 g/actuation:  (04 Sep 2022 12:07)  Lasix 40 mg oral tablet: 1 tab(s) orally once a day, As Needed (04 Sep 2022 12:07)  losartan 100 mg oral tablet: 1 tab(s) orally once a day (04 Sep 2022 12:07)  Metoprolol Tartrate 100 mg oral tablet: 1 tab(s) orally 2 times a day (04 Sep 2022 12:07)  simvastatin 20 mg oral tablet: 1 tab(s) orally once a day (at bedtime) (04 Sep 2022 12:07)  spironolactone 25 mg oral tablet: 1 tab(s) orally Monday, Wednesday, and Friday (04 Sep 2022 12:07)        24HRS EVENT:  9/6  Night  s/p OR, remained hemodynamically stable throughout case, extubated.NGT to [facility].   periop cefotetan x2 more doses    strict NPO  interrogation pending with EP in am  pain improved s/p OR, patient comfortable    Day  NGT replaced- 400 out on insertion  CTA r/o ischemia   u/o 10cc, SBP soft, 500cc bolus   K improved   Nephro consult for MELINAD on CKD with need for CT with IV contrast- obtain IP and PTH, hydrate with LR @ 1cc/kg/hr  Pending OR       DVT PTX: HSQ    GI PTX:pantoprazole  Injectable 40 milliGRAM(s) IV Push daily      ***Tubes/Lines/Drains  ***  Peripheral IV  Urinary Catheter -> to be removed		    Indication: Strict I&O        REVIEW OF SYSTEMS    [x A ten-point review of systems was otherwise negative except as noted.  [ ] Due to altered mental status/intubation, subjective information were not able to be obtained from the patient. History was obtained, to the extent possible, from review of the chart and collateral sources of information.        Daily Height in cm: 177.8 (06 Sep 2022 17:48)      Diet, NPO (09-06-22 @ 23:23)      CURRENT MEDS:  Neurologic Medications  acetaminophen   IVPB .. 1000 milliGRAM(s) IV Intermittent once PRN Mild Pain (1 - 3)  ondansetron Injectable 4 milliGRAM(s) IV Push every 8 hours PRN Nausea and/or Vomiting    Cardiovascular Medications  metoprolol tartrate Injectable 5 milliGRAM(s) IV Push every 6 hours    Gastrointestinal Medications  lactated ringers. 1000 milliLiter(s) IV Continuous <Continuous>  pantoprazole  Injectable 40 milliGRAM(s) IV Push daily    Genitourinary Medications    Hematologic/Oncologic Medications  heparin   Injectable 5000 Unit(s) SubCutaneous every 8 hours    Antimicrobial/Immunologic Medications  cefoTEtan  IVPB 1 Gram(s) IV Intermittent every 12 hours x 2times    Endocrine/Metabolic Medications    Topical/Other Medications  chlorhexidine 2% Cloths 1 Application(s) Topical <User Schedule>      ICU Vital Signs Last 24 Hrs  T(C): 36.7 (07 Sep 2022 08:00), Max: 36.8 (06 Sep 2022 17:30)  T(F): 98 (07 Sep 2022 08:00), Max: 98.2 (06 Sep 2022 17:30)  HR: 94 (07 Sep 2022 12:00) (89 - 106)  BP: 147/73 (07 Sep 2022 12:00) (115/54 - 177/82)  BP(mean): 103 (07 Sep 2022 12:00) (78 - 117)  ABP: 121/60 (07 Sep 2022 12:00) (113/58 - 176/74)  ABP(mean): 84 (07 Sep 2022 12:00) (81 - 108)  RR: 20 (07 Sep 2022 09:00) (18 - 28)  SpO2: 91% (07 Sep 2022 12:00) (90% - 100%)    O2 Parameters below as of 07 Sep 2022 08:00  Patient On (Oxygen Delivery Method): room air              I&O's Summary    06 Sep 2022 07:01  -  07 Sep 2022 07:00  --------------------------------------------------------  IN: 2800 mL / OUT: 2330 mL / NET: 470 mL    07 Sep 2022 07:01  -  07 Sep 2022 12:41  --------------------------------------------------------  IN: 750 mL / OUT: 490 mL / NET: 260 mL      I&O's Detail    06 Sep 2022 07:01  -  07 Sep 2022 07:00  --------------------------------------------------------  IN:    IV PiggyBack: 50 mL    Lactated Ringers: 1250 mL    Lactated Ringers: 1000 mL    Lactated Ringers Bolus: 500 mL  Total IN: 2800 mL    OUT:    Indwelling Catheter - Urethral (mL): 1355 mL    Nasogastric/Oral tube (mL): 975 mL  Total OUT: 2330 mL    Total NET: 470 ml    PHYSICAL EXAM:    General/Neuro: Well nourished, appears stated age, grooming and hygiene appropriate for the context   No repiratory distress          Neuro:  alert & oriented x 3, no focal deficits, cranial  nerves II-XII grossly intact  Pupils: PERRLA EOM intact.  Lungs: clear to auscultation, Normal expansion/effort.   Cardiovascular : S1, S2.  Regular rate and rhythm. No Peripheral edema, DP pulses 2+ b/l  Cardiac Rhythm: Normal Sinus Rhythm  GI: Abdomen soft, Non-tender, Non-distended. Incisions look clean and dry, no oozing, erythema, no tenderness.  Gastrostomy / Jejunostomy tube in place.  Nasogastric tube in place.    Extremities: Extremities warm, pink, well-perfused.  Derm: Good skin turgor, no skin breakdown.    :    Boss catheter in place, no lesions, hernias, discharge.  Psych: Cooperative, linear thought process, makes good eye contact      CXR: there is no evidence of focal consolidation, pleural effusion or pneumothorax. Cardiac- no significant change    LABS:  CAPILLARY BLOOD GLUCOSE      POCT Blood Glucose.: 90 mg/dL (07 Sep 2022 00:27)  POCT Blood Glucose.: 90 mg/dL (06 Sep 2022 17:52)                          12.6   9.25  )-----------( 91       ( 07 Sep 2022 05:59 )             38.5       09-07    142  |  105  |  36<H>  ----------------------------<  172<H>  4.7   |  24  |  2.4<H>    Ca    8.6      07 Sep 2022 05:59  Phos  2.9     09-07  Mg     1.9     09-07          CARDIAC MARKERS ( 07 Sep 2022 05:59 )  x     / <0.01 ng/mL / 113 U/L / x     / 3.4 ng/mL                   JAMEE STATON  [id number]  87y Male    Indication for ICU admission: hemodynamic monitoring and frequent abdominal exams in the setting of closed loop bowel obstruction and medical comorbidities    Admit Date:09-04-22  ICU Date:09-04-22  OR Date:    No Known Allergies    PAST MEDICAL & SURGICAL HISTORY:  Chronic CHF (congestive heart failure)  (EF=15)    Dyslipidemia    CKD (chronic kidney disease) stage 3, GFR 30-59 ml/min  (baseline Creat=1.8)    HTN (hypertension)    H/O testicular cancer 1988    S/P implantation of automatic cardioverter/defibrillator (AICD)  History of pacemaker  Dyslipidemia  H/O excision of testicular mass (Bilateral with LN excision in 1988)      Home Medications:  AndroGel Pump 1.25 g/actuation:  (04 Sep 2022 12:07)  Lasix 40 mg oral tablet: 1 tab(s) orally once a day, As Needed (04 Sep 2022 12:07)  losartan 100 mg oral tablet: 1 tab(s) orally once a day (04 Sep 2022 12:07)  Metoprolol Tartrate 100 mg oral tablet: 1 tab(s) orally 2 times a day (04 Sep 2022 12:07)  simvastatin 20 mg oral tablet: 1 tab(s) orally once a day (at bedtime) (04 Sep 2022 12:07)  spironolactone 25 mg oral tablet: 1 tab(s) orally Monday, Wednesday, and Friday (04 Sep 2022 12:07)        24HRS EVENT:  9/6  Night  s/p OR, remained hemodynamically stable throughout case, extubated.NGT to [facility]ws.   periop cefotetan x2 more doses    strict NPO  interrogation pending with EP in am  pain improved s/p OR, patient comfortable    Day  NGT replaced- 400 out on insertion  CTA r/o ischemia   u/o 10cc, SBP soft, 500cc bolus   K improved   Nephro consult for MELINDA on CKD with need for CT with IV contrast- obtain IP and PTH, hydrate with LR @ 1cc/kg/hr  Pending OR       DVT PTX: HSQ    GI PTX:pantoprazole  Injectable 40 milliGRAM(s) IV Push daily      ***Tubes/Lines/Drains  ***  Peripheral IV  Urinary Catheter -> to be removed		    Indication: Strict I&O        REVIEW OF SYSTEMS    [x A ten-point review of systems was otherwise negative except as noted.  [ ] Due to altered mental status/intubation, subjective information were not able to be obtained from the patient. History was obtained, to the extent possible, from review of the chart and collateral sources of information.        Daily Height in cm: 177.8 (06 Sep 2022 17:48)      Diet, NPO (09-06-22 @ 23:23)      CURRENT MEDS:  Neurologic Medications  acetaminophen   IVPB .. 1000 milliGRAM(s) IV Intermittent once PRN Mild Pain (1 - 3)  ondansetron Injectable 4 milliGRAM(s) IV Push every 8 hours PRN Nausea and/or Vomiting    Cardiovascular Medications  metoprolol tartrate Injectable 5 milliGRAM(s) IV Push every 6 hours    Gastrointestinal Medications  lactated ringers. 1000 milliLiter(s) IV Continuous <Continuous>  pantoprazole  Injectable 40 milliGRAM(s) IV Push daily    Genitourinary Medications    Hematologic/Oncologic Medications  heparin   Injectable 5000 Unit(s) SubCutaneous every 8 hours    Antimicrobial/Immunologic Medications  cefoTEtan  IVPB 1 Gram(s) IV Intermittent every 12 hours x 2times    Endocrine/Metabolic Medications    Topical/Other Medications  chlorhexidine 2% Cloths 1 Application(s) Topical <User Schedule>      ICU Vital Signs Last 24 Hrs  T(C): 36.7 (07 Sep 2022 08:00), Max: 36.8 (06 Sep 2022 17:30)  T(F): 98 (07 Sep 2022 08:00), Max: 98.2 (06 Sep 2022 17:30)  HR: 94 (07 Sep 2022 12:00) (89 - 106)  BP: 147/73 (07 Sep 2022 12:00) (115/54 - 177/82)  BP(mean): 103 (07 Sep 2022 12:00) (78 - 117)  ABP: 121/60 (07 Sep 2022 12:00) (113/58 - 176/74)  ABP(mean): 84 (07 Sep 2022 12:00) (81 - 108)  RR: 20 (07 Sep 2022 09:00) (18 - 28)  SpO2: 91% (07 Sep 2022 12:00) (90% - 100%)    O2 Parameters below as of 07 Sep 2022 08:00  Patient On (Oxygen Delivery Method): room air              I&O's Summary    06 Sep 2022 07:01  -  07 Sep 2022 07:00  --------------------------------------------------------  IN: 2800 mL / OUT: 2330 mL / NET: 470 mL    07 Sep 2022 07:01  -  07 Sep 2022 12:41  --------------------------------------------------------  IN: 750 mL / OUT: 490 mL / NET: 260 mL      I&O's Detail    06 Sep 2022 07:01  -  07 Sep 2022 07:00  --------------------------------------------------------  IN:    IV PiggyBack: 50 mL    Lactated Ringers: 1250 mL    Lactated Ringers: 1000 mL    Lactated Ringers Bolus: 500 mL  Total IN: 2800 mL    OUT:    Indwelling Catheter - Urethral (mL): 1355 mL    Nasogastric/Oral tube (mL): 975 mL  Total OUT: 2330 mL    Total NET: 470 ml    PHYSICAL EXAM:    General/Neuro: Well nourished, appears stated age, grooming and hygiene appropriate for the context   No repiratory distress          Neuro:  alert & oriented x 3, no focal deficits, cranial  nerves II-XII grossly intact  Pupils: PERRLA EOM intact.  Lungs: clear to auscultation, Normal expansion/effort.   Cardiovascular : S1, S2.  Regular rate and rhythm. No Peripheral edema, DP pulses 2+ b/l  Cardiac Rhythm: Normal Sinus Rhythm  GI: Abdomen soft, Non-tender, Non-distended. Incisions look clean and dry, no oozing, erythema, no tenderness.  Gastrostomy / Jejunostomy tube in place.  Nasogastric tube in place.    Extremities: Extremities warm, pink, well-perfused.  Derm: Good skin turgor, no skin breakdown.    :    Boss catheter in place, no lesions, hernias, discharge.  Psych: Cooperative, linear thought process, makes good eye contact      CXR: there is no evidence of focal consolidation, pleural effusion or pneumothorax. Cardiac- no significant change    LABS:  CAPILLARY BLOOD GLUCOSE       POCT Blood Glucose.: 90 mg/dL (07 Sep 2022 00:27)  POCT Blood Glucose.: 90 mg/dL (06 Sep 2022 17:52)                          12.6   9.25  )-----------( 91       ( 07 Sep 2022 05:59 )             38.5       09-07    142  |  105  |  36<H>  ----------------------------<  172<H>  4.7   |  24  |  2.4<H>    Ca    8.6      07 Sep 2022 05:59  Phos  2.9     09-07  Mg     1.9     09-07          CARDIAC MARKERS ( 07 Sep 2022 05:59 )  x     / <0.01 ng/mL / 113 U/L / x     / 3.4 ng/mL                   JAMEE STATON  [id number]  87y Male    Indication for ICU admission: hemodynamic monitoring and frequent abdominal exams in the setting of closed loop bowel obstruction and medical comorbidities    Admit Date:09-04-22  ICU Date:09-04-22  OR Date:    No Known Allergies    PAST MEDICAL & SURGICAL HISTORY:  Chronic CHF (congestive heart failure)  (EF=15)    Dyslipidemia    CKD (chronic kidney disease) stage 3, GFR 30-59 ml/min  (baseline Creat=1.8)    HTN (hypertension)    H/O testicular cancer 1988    S/P implantation of automatic cardioverter/defibrillator (AICD)  History of pacemaker  Dyslipidemia  H/O excision of testicular mass (Bilateral with LN excision in 1988)      Home Medications:  AndroGel Pump 1.25 g/actuation:  (04 Sep 2022 12:07)  Lasix 40 mg oral tablet: 1 tab(s) orally once a day, As Needed (04 Sep 2022 12:07)  losartan 100 mg oral tablet: 1 tab(s) orally once a day (04 Sep 2022 12:07)  Metoprolol Tartrate 100 mg oral tablet: 1 tab(s) orally 2 times a day (04 Sep 2022 12:07)  simvastatin 20 mg oral tablet: 1 tab(s) orally once a day (at bedtime) (04 Sep 2022 12:07)  spironolactone 25 mg oral tablet: 1 tab(s) orally Monday, Wednesday, and Friday (04 Sep 2022 12:07)        24HRS EVENT:  9/6  Night  s/p OR, remained hemodynamically stable throughout case, extubated.NGT to lcws.   periop cefotetan x2 more doses    strict NPO  interrogation pending with EP in am  pain improved s/p OR, patient comfortable    Day  NGT replaced- 400 out on insertion  CTA r/o ischemia   u/o 10cc, SBP soft, 500cc bolus   K improved   Nephro consult for MELINDA on CKD with need for CT with IV contrast- obtain IP and PTH, hydrate with LR @ 1cc/kg/hr  Pending OR       DVT PTX: HSQ    GI PTX:pantoprazole  Injectable 40 milliGRAM(s) IV Push daily      ***Tubes/Lines/Drains  ***  Peripheral IV  Urinary Catheter -> to be removed		    Indication: Strict I&O        REVIEW OF SYSTEMS    [x A ten-point review of systems was otherwise negative except as noted.  [ ] Due to altered mental status/intubation, subjective information were not able to be obtained from the patient. History was obtained, to the extent possible, from review of the chart and collateral sources of information.        Daily Height in cm: 177.8 (06 Sep 2022 17:48)      Diet, NPO (09-06-22 @ 23:23)      CURRENT MEDS:  Neurologic Medications  acetaminophen   IVPB .. 1000 milliGRAM(s) IV Intermittent once PRN Mild Pain (1 - 3)  ondansetron Injectable 4 milliGRAM(s) IV Push every 8 hours PRN Nausea and/or Vomiting    Cardiovascular Medications  metoprolol tartrate Injectable 5 milliGRAM(s) IV Push every 6 hours    Gastrointestinal Medications  lactated ringers. 1000 milliLiter(s) IV Continuous <Continuous>  pantoprazole  Injectable 40 milliGRAM(s) IV Push daily    Genitourinary Medications    Hematologic/Oncologic Medications  heparin   Injectable 5000 Unit(s) SubCutaneous every 8 hours    Antimicrobial/Immunologic Medications  cefoTEtan  IVPB 1 Gram(s) IV Intermittent every 12 hours x 2times    Endocrine/Metabolic Medications    Topical/Other Medications  chlorhexidine 2% Cloths 1 Application(s) Topical <User Schedule>      ICU Vital Signs Last 24 Hrs  T(C): 36.7 (07 Sep 2022 08:00), Max: 36.8 (06 Sep 2022 17:30)  T(F): 98 (07 Sep 2022 08:00), Max: 98.2 (06 Sep 2022 17:30)  HR: 94 (07 Sep 2022 12:00) (89 - 106)  BP: 147/73 (07 Sep 2022 12:00) (115/54 - 177/82)  BP(mean): 103 (07 Sep 2022 12:00) (78 - 117)  ABP: 121/60 (07 Sep 2022 12:00) (113/58 - 176/74)  ABP(mean): 84 (07 Sep 2022 12:00) (81 - 108)  RR: 20 (07 Sep 2022 09:00) (18 - 28)  SpO2: 91% (07 Sep 2022 12:00) (90% - 100%)    O2 Parameters below as of 07 Sep 2022 08:00  Patient On (Oxygen Delivery Method): room air              I&O's Summary    06 Sep 2022 07:01  -  07 Sep 2022 07:00  --------------------------------------------------------  IN: 2800 mL / OUT: 2330 mL / NET: 470 mL    07 Sep 2022 07:01  -  07 Sep 2022 12:41  --------------------------------------------------------  IN: 750 mL / OUT: 490 mL / NET: 260 mL      I&O's Detail    06 Sep 2022 07:01  -  07 Sep 2022 07:00  --------------------------------------------------------  IN:    IV PiggyBack: 50 mL    Lactated Ringers: 1250 mL    Lactated Ringers: 1000 mL    Lactated Ringers Bolus: 500 mL  Total IN: 2800 mL    OUT:    Indwelling Catheter - Urethral (mL): 1355 mL    Nasogastric/Oral tube (mL): 975 mL  Total OUT: 2330 mL    Total NET: 470 ml    PHYSICAL EXAM:    General/Neuro: Well nourished, appears stated age, grooming and hygiene appropriate for the context.  No respiratory distress          Neuro:  alert & oriented x 3, no focal deficits, cranial  nerves II-XII grossly intact  Pupils: PERRLA EOM intact.  Lungs: clear to auscultation, Normal expansion/effort.   Cardiovascular : S1, S2.  Regular rate and rhythm. No Peripheral edema, DP pulses 2+ b/l  Cardiac Rhythm: Normal Sinus Rhythm  GI: Abdomen soft, Non-tender, Non-distended. Incisions look clean and dry, no oozing, erythema, no tenderness.  Nasogastric tube in place.    Extremities: Extremities warm, pink, well-perfused.  Derm: Good skin turgor, no skin breakdown.    :    Boss catheter in place, no lesions, hernias, discharge.  Psych: Cooperative, linear thought process, makes good eye contact      CXR: there is no evidence of focal consolidation, pleural effusion or pneumothorax. Cardiac- no significant change    LABS:  CAPILLARY BLOOD GLUCOSE       POCT Blood Glucose.: 90 mg/dL (07 Sep 2022 00:27)  POCT Blood Glucose.: 90 mg/dL (06 Sep 2022 17:52)                          12.6   9.25  )-----------( 91       ( 07 Sep 2022 05:59 )             38.5       09-07    142  |  105  |  36<H>  ----------------------------<  172<H>  4.7   |  24  |  2.4<H>    Ca    8.6      07 Sep 2022 05:59  Phos  2.9     09-07  Mg     1.9     09-07          CARDIAC MARKERS ( 07 Sep 2022 05:59 )  x     / <0.01 ng/mL / 113 U/L / x     / 3.4 ng/mL                   JAMEE STATON  [id number]  87y Male    Indication for ICU admission: hemodynamic monitoring and frequent abdominal exams in the setting of closed loop bowel obstruction and medical comorbidities    Admit Date:09-04-22  ICU Date:09-04-22  OR Date:    No Known Allergies    PAST MEDICAL & SURGICAL HISTORY:  Chronic CHF (congestive heart failure)  (EF=15)    Dyslipidemia    CKD (chronic kidney disease) stage 3, GFR 30-59 ml/min  (baseline Creat=1.8)    HTN (hypertension)    H/O testicular cancer 1988    S/P implantation of automatic cardioverter/defibrillator (AICD)  History of pacemaker  Dyslipidemia  H/O excision of testicular mass (Bilateral with LN excision in 1988)      Home Medications:  AndroGel Pump 1.25 g/actuation:  (04 Sep 2022 12:07)  Lasix 40 mg oral tablet: 1 tab(s) orally once a day, As Needed (04 Sep 2022 12:07)  losartan 100 mg oral tablet: 1 tab(s) orally once a day (04 Sep 2022 12:07)  Metoprolol Tartrate 100 mg oral tablet: 1 tab(s) orally 2 times a day (04 Sep 2022 12:07)  simvastatin 20 mg oral tablet: 1 tab(s) orally once a day (at bedtime) (04 Sep 2022 12:07)  spironolactone 25 mg oral tablet: 1 tab(s) orally Monday, Wednesday, and Friday (04 Sep 2022 12:07)        24HRS EVENT:  9/6  Night  s/p OR, remained hemodynamically stable throughout case, extubated.NGT to lcws.   periop cefotetan x2 more doses    strict NPO  interrogation pending with EP in am  pain improved s/p OR, patient comfortable    Day  NGT replaced- 400 out on insertion  CTA r/o ischemia   u/o 10cc, SBP soft, 500cc bolus   K improved   Nephro consult for MELINDA on CKD with need for CT with IV contrast- obtain IP and PTH, hydrate with LR @ 1cc/kg/hr  Pending OR       DVT PTX: HSQ    GI PTX:pantoprazole  Injectable 40 milliGRAM(s) IV Push daily      ***Tubes/Lines/Drains  ***  Peripheral IV  Urinary Catheter -> to be removed		    Indication: Strict I&O        REVIEW OF SYSTEMS    [x A ten-point review of systems was otherwise negative except as noted.  [ ] Due to altered mental status/intubation, subjective information were not able to be obtained from the patient. History was obtained, to the extent possible, from review of the chart and collateral sources of information.        Daily Height in cm: 177.8 (06 Sep 2022 17:48)      Diet, NPO (09-06-22 @ 23:23)      CURRENT MEDS:  Neurologic Medications  acetaminophen   IVPB .. 1000 milliGRAM(s) IV Intermittent once PRN Mild Pain (1 - 3)  ondansetron Injectable 4 milliGRAM(s) IV Push every 8 hours PRN Nausea and/or Vomiting    Cardiovascular Medications  metoprolol tartrate Injectable 5 milliGRAM(s) IV Push every 6 hours    Gastrointestinal Medications  lactated ringers. 1000 milliLiter(s) IV Continuous <Continuous>  pantoprazole  Injectable 40 milliGRAM(s) IV Push daily    Genitourinary Medications    Hematologic/Oncologic Medications  heparin   Injectable 5000 Unit(s) SubCutaneous every 8 hours    Antimicrobial/Immunologic Medications  cefoTEtan  IVPB 1 Gram(s) IV Intermittent every 12 hours x 2times    Endocrine/Metabolic Medications    Topical/Other Medications  chlorhexidine 2% Cloths 1 Application(s) Topical <User Schedule>      ICU Vital Signs Last 24 Hrs  T(C): 36.7 (07 Sep 2022 08:00), Max: 36.8 (06 Sep 2022 17:30)  T(F): 98 (07 Sep 2022 08:00), Max: 98.2 (06 Sep 2022 17:30)  HR: 94 (07 Sep 2022 12:00) (89 - 106)  BP: 147/73 (07 Sep 2022 12:00) (115/54 - 177/82)  BP(mean): 103 (07 Sep 2022 12:00) (78 - 117)  ABP: 121/60 (07 Sep 2022 12:00) (113/58 - 176/74)  ABP(mean): 84 (07 Sep 2022 12:00) (81 - 108)  RR: 20 (07 Sep 2022 09:00) (18 - 28)  SpO2: 91% (07 Sep 2022 12:00) (90% - 100%)    O2 Parameters below as of 07 Sep 2022 08:00  Patient On (Oxygen Delivery Method): room air              I&O's Summary    06 Sep 2022 07:01  -  07 Sep 2022 07:00  --------------------------------------------------------  IN: 2800 mL / OUT: 2330 mL / NET: 470 mL    07 Sep 2022 07:01  -  07 Sep 2022 12:41  --------------------------------------------------------  IN: 750 mL / OUT: 490 mL / NET: 260 mL      I&O's Detail    06 Sep 2022 07:01  -  07 Sep 2022 07:00  --------------------------------------------------------  IN:    IV PiggyBack: 50 mL    Lactated Ringers: 1250 mL    Lactated Ringers: 1000 mL    Lactated Ringers Bolus: 500 mL  Total IN: 2800 mL    OUT:    Indwelling Catheter - Urethral (mL): 1355 mL    Nasogastric/Oral tube (mL): 975 mL  Total OUT: 2330 mL    Total NET: 470 ml    PHYSICAL EXAM:    General/Neuro: Well nourished, appears stated age, grooming and hygiene appropriate for the context.  No respiratory distress          Neuro:  alert & oriented x 3, no focal deficits, cranial  nerves II-XII grossly intact  Pupils: PERRLA EOM intact.  Lungs: clear to auscultation, Normal expansion/effort.   Cardiovascular : S1, S2.  Regular rate and rhythm. No Peripheral edema, DP pulses 2+ b/l  Cardiac Rhythm: Normal Sinus Rhythm  GI: Abdomen soft, Non-tender, Non-distended. Incisions look clean and dry, no oozing, erythema, no tenderness.  Nasogastric tube in place.    Extremities: Extremities warm, pink, well-perfused.  Derm: Good skin turgor, no skin breakdown.    :  Boss catheter to be removed, no lesions, hernias, discharge.  Psych: Cooperative, linear thought process, makes good eye contact      CXR: there is no evidence of focal consolidation, pleural effusion or pneumothorax. Cardiac- no significant change    LABS:  CAPILLARY BLOOD GLUCOSE   POCT Blood Glucose.: 90 mg/dL (07 Sep 2022 00:27)  POCT Blood Glucose.: 90 mg/dL (06 Sep 2022 17:52)                          12.6   9.25  )-----------( 91       ( 07 Sep 2022 05:59 )             38.5       09-07    142  |  105  |  36<H>  ----------------------------<  172<H>  4.7   |  24  |  2.4<H>    Ca    8.6      07 Sep 2022 05:59  Phos  2.9     09-07  Mg     1.9     09-07          CARDIAC MARKERS ( 07 Sep 2022 05:59 )  x     / <0.01 ng/mL / 113 U/L / x     / 3.4 ng/mL

## 2022-09-07 NOTE — PROGRESS NOTE ADULT - SUBJECTIVE AND OBJECTIVE BOX
Nephrology progress note    THIS IS AN INCOMPLETE NOTE . FULL NOTE TO FOLLOW SHORTLY    Patient is seen and examined, events over the last 24 h noted .    Allergies:  No Known Allergies    Hospital Medications:   MEDICATIONS  (STANDING):  cefoTEtan  IVPB 1 Gram(s) IV Intermittent every 12 hours  chlorhexidine 2% Cloths 1 Application(s) Topical <User Schedule>  heparin   Injectable 5000 Unit(s) SubCutaneous every 8 hours  lactated ringers. 1000 milliLiter(s) (125 mL/Hr) IV Continuous <Continuous>  metoprolol tartrate Injectable 5 milliGRAM(s) IV Push every 6 hours  pantoprazole  Injectable 40 milliGRAM(s) IV Push daily        VITALS:  T(F): 98 (09-07-22 @ 08:00), Max: 98.2 (09-06-22 @ 17:30)  HR: 91 (09-07-22 @ 08:00)  BP: 153/78 (09-07-22 @ 08:00)  RR: 18 (09-07-22 @ 08:00)  SpO2: 96% (09-07-22 @ 08:00)  Wt(kg): --    09-05 @ 07:01  -  09-06 @ 07:00  --------------------------------------------------------  IN: 3100 mL / OUT: 1510 mL / NET: 1590 mL    09-06 @ 07:01  -  09-07 @ 07:00  --------------------------------------------------------  IN: 2800 mL / OUT: 2330 mL / NET: 470 mL    09-07 @ 07:01  -  09-07 @ 09:11  --------------------------------------------------------  IN: 250 mL / OUT: 75 mL / NET: 175 mL      Height (cm): 177.8 (09-06 @ 17:48)  Weight (kg): 76.1 (09-06 @ 17:48)  BMI (kg/m2): 24.1 (09-06 @ 17:48)  BSA (m2): 1.94 (09-06 @ 17:48)    PHYSICAL EXAM:  Constitutional: NAD  HEENT: anicteric sclera, oropharynx clear, MMM  Neck: No JVD  Respiratory: CTAB, no wheezes, rales or rhonchi  Cardiovascular: S1, S2, RRR  Gastrointestinal: BS+, soft, NT/ND  Extremities: No cyanosis or clubbing. No peripheral edema  :  No moses.   Skin: No rashes    LABS:  09-07    142  |  105  |  36<H>  ----------------------------<  172<H>  4.7   |  24  |  2.4<H>    Ca    8.6      07 Sep 2022 05:59  Phos  2.9     09-07  Mg     1.9     09-07                            12.6   9.25  )-----------( 91       ( 07 Sep 2022 05:59 )             38.5       Urine Studies:    Creatinine, Random Urine: 146 mg/dL (09-05 @ 16:11)  Sodium, Random Urine: 30.0 mmoL/L (09-05 @ 16:11)              RADIOLOGY & ADDITIONAL STUDIES:   Nephrology progress note  Patient is seen and examined, events over the last 24 h noted .  lying in bed   sp OR yesterday     Allergies:  No Known Allergies    Hospital Medications:   MEDICATIONS  (STANDING):  cefoTEtan  IVPB 1 Gram(s) IV Intermittent every 12 hours  heparin   Injectable 5000 Unit(s) SubCutaneous every 8 hours  lactated ringers. 1000 milliLiter(s) (125 mL/Hr) IV Continuous <Continuous>  metoprolol tartrate Injectable 5 milliGRAM(s) IV Push every 6 hours  pantoprazole  Injectable 40 milliGRAM(s) IV Push daily        VITALS:  T(F): 98 (09-07-22 @ 08:00), Max: 98.2 (09-06-22 @ 17:30)  HR: 91 (09-07-22 @ 08:00)  BP: 153/78 (09-07-22 @ 08:00)  RR: 18 (09-07-22 @ 08:00)  SpO2: 96% (09-07-22 @ 08:00)      09-05 @ 07:01  -  09-06 @ 07:00  --------------------------------------------------------  IN: 3100 mL / OUT: 1510 mL / NET: 1590 mL    09-06 @ 07:01  -  09-07 @ 07:00  --------------------------------------------------------  IN: 2800 mL / OUT: 2330 mL / NET: 470 mL    09-07 @ 07:01  -  09-07 @ 09:11  --------------------------------------------------------  IN: 250 mL / OUT: 75 mL / NET: 175 mL      Height (cm): 177.8 (09-06 @ 17:48)  Weight (kg): 76.1 (09-06 @ 17:48)  BMI (kg/m2): 24.1 (09-06 @ 17:48)  BSA (m2): 1.94 (09-06 @ 17:48)    PHYSICAL EXAM:  Constitutional: NAD  Neck: NGT   Respiratory: CTAB, no wheezes, rales or rhonchi  Cardiovascular: S1, S2, RRR  Gastrointestinal: BS+, soft, NT/ND  Extremities: No cyanosis or clubbing. No peripheral edema  :  No moses.   Skin: No rashes    LABS:  09-07    142  |  105  |  36<H>  ----------------------------<  172<H>  4.7   |  24  |  2.4<H>    Ca    8.6      07 Sep 2022 05:59  Phos  2.9     09-07  Mg     1.9     09-07                            12.6   9.25  )-----------( 91       ( 07 Sep 2022 05:59 )             38.5       Urine Studies:    Creatinine, Random Urine: 146 mg/dL (09-05 @ 16:11)  Sodium, Random Urine: 30.0 mmoL/L (09-05 @ 16:11)              RADIOLOGY & ADDITIONAL STUDIES:  < from: CT Angio Abdomen and Pelvis w/ IV Cont (09.06.22 @ 16:38) >  KIDNEYS: There is symmetric renal enhancement. Cortical atrophy again   noted along the medial aspect of the left kidney. No evidence of   hydronephrosis, calcified stones, or solid mass. Bilateral renal cysts.    < end of copied text >

## 2022-09-08 LAB
GLUCOSE BLDC GLUCOMTR-MCNC: 94 MG/DL — SIGNIFICANT CHANGE UP (ref 70–99)
GLUCOSE BLDC GLUCOMTR-MCNC: 98 MG/DL — SIGNIFICANT CHANGE UP (ref 70–99)

## 2022-09-08 RX ORDER — METOPROLOL TARTRATE 50 MG
100 TABLET ORAL
Refills: 0 | Status: DISCONTINUED | OUTPATIENT
Start: 2022-09-08 | End: 2022-09-10

## 2022-09-08 RX ORDER — PANTOPRAZOLE SODIUM 20 MG/1
40 TABLET, DELAYED RELEASE ORAL
Refills: 0 | Status: DISCONTINUED | OUTPATIENT
Start: 2022-09-08 | End: 2022-09-10

## 2022-09-08 RX ORDER — LOSARTAN POTASSIUM 100 MG/1
100 TABLET, FILM COATED ORAL DAILY
Refills: 0 | Status: DISCONTINUED | OUTPATIENT
Start: 2022-09-08 | End: 2022-09-10

## 2022-09-08 RX ORDER — ACETAMINOPHEN 500 MG
650 TABLET ORAL EVERY 4 HOURS
Refills: 0 | Status: DISCONTINUED | OUTPATIENT
Start: 2022-09-08 | End: 2022-09-10

## 2022-09-08 RX ORDER — SODIUM CHLORIDE 9 MG/ML
1000 INJECTION, SOLUTION INTRAVENOUS
Refills: 0 | Status: DISCONTINUED | OUTPATIENT
Start: 2022-09-08 | End: 2022-09-09

## 2022-09-08 RX ADMIN — Medication 5 MILLIGRAM(S): at 05:39

## 2022-09-08 RX ADMIN — Medication 5 MILLIGRAM(S): at 00:44

## 2022-09-08 RX ADMIN — SODIUM CHLORIDE 125 MILLILITER(S): 9 INJECTION, SOLUTION INTRAVENOUS at 00:44

## 2022-09-08 RX ADMIN — HEPARIN SODIUM 5000 UNIT(S): 5000 INJECTION INTRAVENOUS; SUBCUTANEOUS at 05:39

## 2022-09-08 RX ADMIN — Medication 5 MILLIGRAM(S): at 17:52

## 2022-09-08 RX ADMIN — HEPARIN SODIUM 5000 UNIT(S): 5000 INJECTION INTRAVENOUS; SUBCUTANEOUS at 13:34

## 2022-09-08 RX ADMIN — Medication 5 MILLIGRAM(S): at 11:39

## 2022-09-08 RX ADMIN — CHLORHEXIDINE GLUCONATE 1 APPLICATION(S): 213 SOLUTION TOPICAL at 05:42

## 2022-09-08 RX ADMIN — HEPARIN SODIUM 5000 UNIT(S): 5000 INJECTION INTRAVENOUS; SUBCUTANEOUS at 21:12

## 2022-09-08 RX ADMIN — PANTOPRAZOLE SODIUM 40 MILLIGRAM(S): 20 TABLET, DELAYED RELEASE ORAL at 11:38

## 2022-09-08 NOTE — PHYSICAL THERAPY INITIAL EVALUATION ADULT - GENERAL OBSERVATIONS, REHAB EVAL
patient encountered sitting on chair bedside, NAD, agreeable to PT evaluation. Time in: 9:30 Time out: 10:00

## 2022-09-08 NOTE — PROGRESS NOTE ADULT - ASSESSMENT
87 year old male with past medical history of CHF, AICD & PPM due to CHF, HTN, CKD stage III (baseline Creatinine of 1.8), new diagnosis of bladder obstruction due to mass and remote history of testicular cancer (s/p removal of B/L testicles and left lymph node excision in 1988) presenting with closed loop SBO. Nephrology consulted for MELINDA on CKD3 and need for CT scan with IV contrast.     # MELINDA on CKD 3A / SBO / HTN / bladder obstruction due to mass / SBO  probably prerenal sp OR and removal of adhesions  creat at baseline now    continue IVF  LR at 100 cc per hour follow UOP if NPO DC once able to tolerate po  follow BMP IP   meds reviewed     will sign off recall PRN / call using TEAMS or on [phone]

## 2022-09-08 NOTE — CHART NOTE - NSCHARTNOTEFT_GEN_A_CORE
JAMEE STATON  [id number]  87y Male    Indication for ICU admission: hemodynamic monitoring and frequent abdominal exams in the setting of closed loop bowel obstruction and medical comorbidities    Admit Date:09-04-22  ICU Date:09-04-22  OR Date:    No Known Allergies    PAST MEDICAL & SURGICAL HISTORY:  Chronic CHF (congestive heart failure)  (EF=15)    Dyslipidemia    CKD (chronic kidney disease) stage 3, GFR 30-59 ml/min  (baseline Creat=1.8)    HTN (hypertension)    H/O testicular cancer 1988    S/P implantation of automatic cardioverter/defibrillator (AICD)  History of pacemaker  Dyslipidemia  H/O excision of testicular mass (Bilateral with LN excision in 1988)      Home Medications:  AndroGel Pump 1.25 g/actuation:  (04 Sep 2022 12:07)  Lasix 40 mg oral tablet: 1 tab(s) orally once a day, As Needed (04 Sep 2022 12:07)  losartan 100 mg oral tablet: 1 tab(s) orally once a day (04 Sep 2022 12:07)  Metoprolol Tartrate 100 mg oral tablet: 1 tab(s) orally 2 times a day (04 Sep 2022 12:07)  simvastatin 20 mg oral tablet: 1 tab(s) orally once a day (at bedtime) (04 Sep 2022 12:07)  spironolactone 25 mg oral tablet: 1 tab(s) orally Monday, Wednesday, and Friday (04 Sep 2022 12:07)        24HRS EVENT:  9/7  Night  Cr improving 2.4--> 1.9  No repletions  Passed TOV  Pain controlled, comfortable     Day 9/7  Day  AICD interrogated- last VT episode 12/31/21  s/p OR, hemorraghic bowel moved out of LUQ, peristalsis appreciated during surgery no bowel resection.  Strict NPO  Ambulate as tolerated  No gas passed yet  Nephro following, GFR 30-> 26  Moses removed at 1200  Replacement of NGT      DVT PTX: HSQ    GI PTX:pantoprazole  Injectable 40 milliGRAM(s) IV Push daily      ***Tubes/Lines/Drains  ***  Peripheral IV  Urinary Catheter		Indication: Strict I&O    Date Placed:       REVIEW OF SYSTEMS    [x A ten-point review of systems was otherwise negative except as noted.  [ ] Due to altered mental status/intubation, subjective information were not able to be obtained from the patient. History was obtained, to the extent possible, from review of the chart and collateral sources of information.      Assessment and Plan:   · Assessment    Assessment & Plan  87y Male w/ Closed loop bowel obstruction     NEURO:  Acute pain-controlled with IV Tylenol    RESP:   - Saturating well on RA   - Encourage IS  - CXr 9/7: Enteric tube passing below the diaphragm.No focal consolidation, pleural effusion, or pneumothorax.  - Activity- Ambulate as tolerated    CARDS:   #Hx HFrEF (61% on 9/5) w/ AICD and PPM  - Continue Metoprolol 5 q6 IV (takes 100 BID at home)  - Holding home Lasix 40 PRN and Spironolactone 25mg MWF  - EF: 65% on repeat echo, trace MV regurg, mild TR   #Hx HTN  - Holding home losartan 100 daily  #Hx HLD  - Holding home Simvastatin 20 daily  #Lactic acidosis - resolved   - EKG: atrial sensed ventricular paced rhythm, biventricular pacemaker detected,   - Troponin neg x 3    GI/NUTR: 9  Closed loop bowel obstruction  - Diet: NPO  - GI Prophylaxis- PPI  - Bowel regimen- none  - NGT to LCWS  - CTAP 9/6:     /RENAL:   - Monitor UO-moses in place  - LR @125    Labs:          BUN/Cr- 36/2.4  -->,  35/1.9  -->          Electrolytes-Na 142 // K 4.3 // Mg 2.0 //  Phos 3.3  #CKD stage 3 (baseline Cr 1.8)  - Nephro consult for MELINDA on CKD with need for CT with IV contrast- obtain IP and PTH, hydrate with LR @ 1cc/kg/hr  #Testicular CA s/p orchiectomy  - Holding home Testosterone 20.25 MG/ACT (1.62%) Transdermal Gel (AndroGel Pump); APPLY TWO PUMP PRESSES ONE TIME DAILY AS DIRECTED MDD:2 pumps    HEME/ONC:   - DVT prophylaxis-heparin   Injectable, SCDs    Labs: Hb/Hct:  13.8/41 --> 14/41              Plts:  112              PTT/INR:  32.9/0.98  --->       ID:  WBC- 9.2  Temp trend- 24hrs T(F): 97.8 (09-04 @ 23:25), Max: 97.8 (09-04 @ 23:25)  Antibiotics- cefotetan perioperatively x 2 doses- complete    ENDO:  No chronic Hx  - FS q4 while NPO  - HA1C 5.5%    LINES/DRAINS:  PIV    DISPO:  floor      f/u:  2000 labs  diet and ng as per Dr. Geronimo's team; keep NPO except sips and chips and meds JAMEE STATON  [id number]  87y Male    Indication for ICU admission: hemodynamic monitoring and frequent abdominal exams in the setting of closed loop bowel obstruction and medical comorbidities    Admit Date:09-04-22  ICU Date:09-04-22  OR Date:    No Known Allergies    PAST MEDICAL & SURGICAL HISTORY:  Chronic CHF (congestive heart failure)  (EF=15)    Dyslipidemia    CKD (chronic kidney disease) stage 3, GFR 30-59 ml/min  (baseline Creat=1.8)    HTN (hypertension)    H/O testicular cancer 1988    S/P implantation of automatic cardioverter/defibrillator (AICD)  History of pacemaker  Dyslipidemia  H/O excision of testicular mass (Bilateral with LN excision in 1988)      Home Medications:  AndroGel Pump 1.25 g/actuation:  (04 Sep 2022 12:07)  Lasix 40 mg oral tablet: 1 tab(s) orally once a day, As Needed (04 Sep 2022 12:07)  losartan 100 mg oral tablet: 1 tab(s) orally once a day (04 Sep 2022 12:07)  Metoprolol Tartrate 100 mg oral tablet: 1 tab(s) orally 2 times a day (04 Sep 2022 12:07)  simvastatin 20 mg oral tablet: 1 tab(s) orally once a day (at bedtime) (04 Sep 2022 12:07)  spironolactone 25 mg oral tablet: 1 tab(s) orally Monday, Wednesday, and Friday (04 Sep 2022 12:07)        24HRS EVENT:  9/7  Night  Cr improving 2.4--> 1.9  No repletions  Passed TOV  Pain controlled, comfortable     Day 9/7  Day  AICD interrogated- last VT episode 12/31/21  s/p OR, hemorraghic bowel moved out of LUQ, peristalsis appreciated during surgery no bowel resection.  Strict NPO  Ambulate as tolerated  No gas passed yet  Nephro following, GFR 30-> 26  Moses removed at 1200  Replacement of NGT      DVT PTX: HSQ    GI PTX:pantoprazole  Injectable 40 milliGRAM(s) IV Push daily      ***Tubes/Lines/Drains  ***  Peripheral IV  Urinary Catheter		Indication: Strict I&O    Date Placed:       REVIEW OF SYSTEMS    [x A ten-point review of systems was otherwise negative except as noted.  [ ] Due to altered mental status/intubation, subjective information were not able to be obtained from the patient. History was obtained, to the extent possible, from review of the chart and collateral sources of information.      Assessment and Plan:   · Assessment    Assessment & Plan  87y Male w/ Closed loop bowel obstruction     NEURO:  Acute pain-controlled with IV Tylenol    RESP:   - Saturating well on RA   - Encourage IS  - CXr 9/7: Enteric tube passing below the diaphragm.No focal consolidation, pleural effusion, or pneumothorax.  - Activity- Ambulate as tolerated    CARDS:   #Hx HFrEF (61% on 9/5) w/ AICD and PPM  - Continue Metoprolol 5 q6 IV (takes 100 BID at home)  - Holding home Lasix 40 PRN and Spironolactone 25mg MWF  - EF: 65% on repeat echo, trace MV regurg, mild TR   #Hx HTN  - Holding home losartan 100 daily  #Hx HLD  - Holding home Simvastatin 20 daily  #Lactic acidosis - resolved   - EKG: atrial sensed ventricular paced rhythm, biventricular pacemaker detected,   - Troponin neg x 3    GI/NUTR: 9  Closed loop bowel obstruction  - Diet: NPO  - GI Prophylaxis- PPI  - Bowel regimen- none  - NGT to LCWS  - CTAP 9/6:     /RENAL:   - Monitor UO-moses in place  - LR @125    Labs:          BUN/Cr- 36/2.4  -->,  35/1.9  -->          Electrolytes-Na 142 // K 4.3 // Mg 2.0 //  Phos 3.3  #CKD stage 3 (baseline Cr 1.8)  - Nephro consult for MELINDA on CKD with need for CT with IV contrast- obtain IP and PTH, hydrate with LR @ 1cc/kg/hr  #Testicular CA s/p orchiectomy  - Holding home Testosterone 20.25 MG/ACT (1.62%) Transdermal Gel (AndroGel Pump); APPLY TWO PUMP PRESSES ONE TIME DAILY AS DIRECTED MDD:2 pumps    HEME/ONC:   - DVT prophylaxis-heparin   Injectable, SCDs    Labs: Hb/Hct:  13.8/41 --> 14/41              Plts:  112              PTT/INR:  32.9/0.98  --->       ID:  WBC- 9.2  Temp trend- 24hrs T(F): 97.8 (09-04 @ 23:25), Max: 97.8 (09-04 @ 23:25)  Antibiotics- cefotetan perioperatively x 2 doses- complete    ENDO:  No chronic Hx  - FS q4 while NPO  - HA1C 5.5%    LINES/DRAINS:  PIV    DISPO:  floor      f/u:  2000 labs  diet and ng as per Dr. Geronimo's team; Ng out now; diet clears JAMEE STATON  [id number]  87y Male    Indication for ICU admission: hemodynamic monitoring and frequent abdominal exams in the setting of closed loop bowel obstruction and medical comorbidities    Admit Date:09-04-22  ICU Date:09-04-22  OR Date:    No Known Allergies    PAST MEDICAL & SURGICAL HISTORY:  Chronic CHF (congestive heart failure)  (EF=15)    Dyslipidemia    CKD (chronic kidney disease) stage 3, GFR 30-59 ml/min  (baseline Creat=1.8)    HTN (hypertension)    H/O testicular cancer 1988    S/P implantation of automatic cardioverter/defibrillator (AICD)  History of pacemaker  Dyslipidemia  H/O excision of testicular mass (Bilateral with LN excision in 1988)      Home Medications:  AndroGel Pump 1.25 g/actuation:  (04 Sep 2022 12:07)  Lasix 40 mg oral tablet: 1 tab(s) orally once a day, As Needed (04 Sep 2022 12:07)  losartan 100 mg oral tablet: 1 tab(s) orally once a day (04 Sep 2022 12:07)  Metoprolol Tartrate 100 mg oral tablet: 1 tab(s) orally 2 times a day (04 Sep 2022 12:07)  simvastatin 20 mg oral tablet: 1 tab(s) orally once a day (at bedtime) (04 Sep 2022 12:07)  spironolactone 25 mg oral tablet: 1 tab(s) orally Monday, Wednesday, and Friday (04 Sep 2022 12:07)        24HRS EVENT:  9/7  Night  Cr improving 2.4--> 1.9  No repletions  Passed TOV  Pain controlled, comfortable     Day 9/7  Day  AICD interrogated- last VT episode 12/31/21  s/p OR, hemorraghic bowel moved out of LUQ, peristalsis appreciated during surgery no bowel resection.  Strict NPO  Ambulate as tolerated  No gas passed yet  Nephro following, GFR 30-> 26  Moses removed at 1200  Replacement of NGT      DVT PTX: HSQ    GI PTX:pantoprazole  Injectable 40 milliGRAM(s) IV Push daily      ***Tubes/Lines/Drains  ***  Peripheral IV  Urinary Catheter		Indication: Strict I&O    Date Placed:       REVIEW OF SYSTEMS    [x A ten-point review of systems was otherwise negative except as noted.  [ ] Due to altered mental status/intubation, subjective information were not able to be obtained from the patient. History was obtained, to the extent possible, from review of the chart and collateral sources of information.      Assessment and Plan:   · Assessment    Assessment & Plan  87y Male w/ Closed loop bowel obstruction     NEURO:  Acute pain-controlled with IV Tylenol    RESP:   - Saturating well on RA   - Encourage IS  - CXr 9/7: Enteric tube passing below the diaphragm.No focal consolidation, pleural effusion, or pneumothorax.  - Activity- Ambulate as tolerated    CARDS:   #Hx HFrEF (61% on 9/5) w/ AICD and PPM  - Continue Metoprolol 5 q6 IV (takes 100 BID at home)  - Holding home Lasix 40 PRN and Spironolactone 25mg MWF  - EF: 65% on repeat echo, trace MV regurg, mild TR   #Hx HTN  - Holding home losartan 100 daily  #Hx HLD  - Holding home Simvastatin 20 daily  #Lactic acidosis - resolved   - EKG: atrial sensed ventricular paced rhythm, biventricular pacemaker detected,   - Troponin neg x 3    GI/NUTR: 9  Closed loop bowel obstruction  - Diet: NPO  - GI Prophylaxis- PPI  - Bowel regimen- none  - NGT to LCWS  - CTAP 9/6:     /RENAL:   - Monitor UO-moses in place  - LR @125    Labs:          BUN/Cr- 36/2.4  -->,  35/1.9  -->          Electrolytes-Na 142 // K 4.3 // Mg 2.0 //  Phos 3.3  #CKD stage 3 (baseline Cr 1.8)  - Nephro consult for MELINDA on CKD with need for CT with IV contrast- obtain IP and PTH, hydrate with LR @ 1cc/kg/hr  #Testicular CA s/p orchiectomy  - Holding home Testosterone 20.25 MG/ACT (1.62%) Transdermal Gel (AndroGel Pump); APPLY TWO PUMP PRESSES ONE TIME DAILY AS DIRECTED MDD:2 pumps    HEME/ONC:   - DVT prophylaxis-heparin   Injectable, SCDs    Labs: Hb/Hct:  13.8/41 --> 14/41              Plts:  112              PTT/INR:  32.9/0.98  --->       ID:  WBC- 9.2  Temp trend- 24hrs T(F): 97.8 (09-04 @ 23:25), Max: 97.8 (09-04 @ 23:25)  Antibiotics- cefotetan perioperatively x 2 doses- complete    ENDO:  No chronic Hx  - FS q4 while NPO  - HA1C 5.5%    LINES/DRAINS:  PIV    DISPO:  floor      Patient signed out to Resident Evans on [facility] team at 17:58 on 9/8/22.    f/u:  2000 labs  diet and ng as per Dr. Geronimo's team; Ng out now; diet clears

## 2022-09-08 NOTE — PHYSICAL THERAPY INITIAL EVALUATION ADULT - PERTINENT HX OF CURRENT PROBLEM, REHAB EVAL
87 year old male with past medical history of CHF, AICD & PPM due to CHF, HTN, CKD stage III (baseline Creatinine of 1.8), new diagnosis of bladder obstruction due to mass and remote history of testicular cancer (s/p removal of B/L testicles and left lymph node excision in 1988) who now presents in the ER with a one day history of lower abdominal pain, Left > Right .   Denies radiation of the pain.     Patient reports the pain started yesterday.  Denies prior episodes of bowel obstructions.    Reports the pain started as mild then progressed to severe 8/10 on pain scale today so came to the ER for an evaluation.  Pain characterized as sharp and crampy.     Patient reports associated non bloody, non bilious vomiting and urinary hesitancy and decreased flow.

## 2022-09-08 NOTE — PROGRESS NOTE ADULT - SUBJECTIVE AND OBJECTIVE BOX
JAMEE STATON  [id number]  87y Male    Indication for ICU admission: hemodynamic monitoring and frequent abdominal exams in the setting of closed loop bowel obstruction and medical comorbidities    Admit Date:09-04-22  ICU Date:09-04-22  OR Date:    No Known Allergies    PAST MEDICAL & SURGICAL HISTORY:  Chronic CHF (congestive heart failure)  (EF=15)    Dyslipidemia    CKD (chronic kidney disease) stage 3, GFR 30-59 ml/min  (baseline Creat=1.8)    HTN (hypertension)    H/O testicular cancer 1988    S/P implantation of automatic cardioverter/defibrillator (AICD)  History of pacemaker  Dyslipidemia  H/O excision of testicular mass (Bilateral with LN excision in 1988)      Home Medications:  AndroGel Pump 1.25 g/actuation:  (04 Sep 2022 12:07)  Lasix 40 mg oral tablet: 1 tab(s) orally once a day, As Needed (04 Sep 2022 12:07)  losartan 100 mg oral tablet: 1 tab(s) orally once a day (04 Sep 2022 12:07)  Metoprolol Tartrate 100 mg oral tablet: 1 tab(s) orally 2 times a day (04 Sep 2022 12:07)  simvastatin 20 mg oral tablet: 1 tab(s) orally once a day (at bedtime) (04 Sep 2022 12:07)  spironolactone 25 mg oral tablet: 1 tab(s) orally Monday, Wednesday, and Friday (04 Sep 2022 12:07)        24HRS EVENT:  9/7  Night  Cr improving 2.4--> 1.9  No repletions  Passed TOV  Pain controlled, comfortable     Day 9/7  AICD interrogated- last VT episode 12/31/21  s/p OR, hemorraghic bowel moved out of LUQ, peristalsis appreciated during surgery no bowel resection.  Strict NPO  Ambulate as tolerated  No gas passed yet  Nephro following, GFR 30-> 26  Boss removed at 1200  Replacement of NGT      DVT PTX: HSQ    GI PTX:pantoprazole  Injectable 40 milliGRAM(s) IV Push daily      ***Tubes/Lines/Drains  ***  Peripheral IV  Urinary Catheter		Indication: Strict I&O    Date Placed:       REVIEW OF SYSTEMS    [x A ten-point review of systems was otherwise negative except as noted.  [ ] Due to altered mental status/intubation, subjective information were not able to be obtained from the patient. History was obtained, to the extent possible, from review of the chart and collateral sources of information.               JAMEE STATON  [id number]  87y Male    Indication for ICU admission: hemodynamic monitoring and frequent abdominal exams in the setting of closed loop bowel obstruction and medical comorbidities    Admit Date:09-04-22  ICU Date:09-04-22  OR Date:    No Known Allergies    PAST MEDICAL & SURGICAL HISTORY:  Chronic CHF (congestive heart failure)  (EF=15)    Dyslipidemia    CKD (chronic kidney disease) stage 3, GFR 30-59 ml/min  (baseline Creat=1.8)    HTN (hypertension)    H/O testicular cancer 1988    S/P implantation of automatic cardioverter/defibrillator (AICD)  History of pacemaker  Dyslipidemia  H/O excision of testicular mass (Bilateral with LN excision in 1988)      Home Medications:  AndroGel Pump 1.25 g/actuation:  (04 Sep 2022 12:07)  Lasix 40 mg oral tablet: 1 tab(s) orally once a day, As Needed (04 Sep 2022 12:07)  losartan 100 mg oral tablet: 1 tab(s) orally once a day (04 Sep 2022 12:07)  Metoprolol Tartrate 100 mg oral tablet: 1 tab(s) orally 2 times a day (04 Sep 2022 12:07)  simvastatin 20 mg oral tablet: 1 tab(s) orally once a day (at bedtime) (04 Sep 2022 12:07)  spironolactone 25 mg oral tablet: 1 tab(s) orally Monday, Wednesday, and Friday (04 Sep 2022 12:07)        24HRS EVENT:  9/7  Night  Cr improving 2.4--> 1.9  No repletions  Passed TOV  Pain controlled, comfortable     Day 9/7  Day  AICD interrogated- last VT episode 12/31/21  s/p OR, hemorraghic bowel moved out of LUQ, peristalsis appreciated during surgery no bowel resection.  Strict NPO  Ambulate as tolerated  No gas passed yet  Nephro following, GFR 30-> 26  Boss removed at 1200  Replacement of NGT      DVT PTX: HSQ    GI PTX:pantoprazole  Injectable 40 milliGRAM(s) IV Push daily      ***Tubes/Lines/Drains  ***  Peripheral IV  Urinary Catheter		Indication: Strict I&O    Date Placed:       REVIEW OF SYSTEMS    [x A ten-point review of systems was otherwise negative except as noted.  [ ] Due to altered mental status/intubation, subjective information were not able to be obtained from the patient. History was obtained, to the extent possible, from review of the chart and collateral sources of information.

## 2022-09-08 NOTE — PHYSICAL THERAPY INITIAL EVALUATION ADULT - LEVEL OF INDEPENDENCE: STAIR NEGOTIATION, REHAB EVAL
PT unable to manage safely guarding patient and managing IV at the same time, will follow up when no longer on IV ABx to clear for assessment of stair negotiation./unable to perform

## 2022-09-08 NOTE — PROGRESS NOTE ADULT - SUBJECTIVE AND OBJECTIVE BOX
Nephrology progress note    THIS IS AN INCOMPLETE NOTE . FULL NOTE TO FOLLOW SHORTLY    Patient is seen and examined, events over the last 24 h noted .    Allergies:  No Known Allergies    Hospital Medications:   MEDICATIONS  (STANDING):  chlorhexidine 2% Cloths 1 Application(s) Topical <User Schedule>  heparin   Injectable 5000 Unit(s) SubCutaneous every 8 hours  lactated ringers. 1000 milliLiter(s) (125 mL/Hr) IV Continuous <Continuous>  metoprolol tartrate Injectable 5 milliGRAM(s) IV Push every 6 hours  pantoprazole  Injectable 40 milliGRAM(s) IV Push daily        VITALS:  T(F): 97.7 (09-08-22 @ 08:00), Max: 98.4 (09-07-22 @ 16:00)  HR: 85 (09-08-22 @ 08:00)  BP: 147/73 (09-07-22 @ 12:00)  RR: 20 (09-08-22 @ 08:00)  SpO2: 93% (09-08-22 @ 08:00)  Wt(kg): --    09-06 @ 07:01  -  09-07 @ 07:00  --------------------------------------------------------  IN: 2800 mL / OUT: 2330 mL / NET: 470 mL    09-07 @ 07:01  -  09-08 @ 07:00  --------------------------------------------------------  IN: 3000 mL / OUT: 1725 mL / NET: 1275 mL          PHYSICAL EXAM:  Constitutional: NAD  HEENT: anicteric sclera, oropharynx clear, MMM  Neck: No JVD  Respiratory: CTAB, no wheezes, rales or rhonchi  Cardiovascular: S1, S2, RRR  Gastrointestinal: BS+, soft, NT/ND  Extremities: No cyanosis or clubbing. No peripheral edema  :  No moses.   Skin: No rashes    LABS:  09-07    142  |  104  |  35<H>  ----------------------------<  96  4.3   |  27  |  1.9<H>    Ca    8.3<L>      07 Sep 2022 21:36  Phos  3.3     09-07  Mg     2.0     09-07                            14.0   9.22  )-----------( 112      ( 07 Sep 2022 21:36 )             41.8       Urine Studies:    Creatinine, Random Urine: 146 mg/dL (09-05 @ 16:11)  Sodium, Random Urine: 30.0 mmoL/L (09-05 @ 16:11)      PTH -- (Ca 9.2)      [09-06-22 @ 22:19]   21          RADIOLOGY & ADDITIONAL STUDIES:   Nephrology progress note  Patient is seen and examined, events over the last 24 h noted .  Lying in bed comfortable  NGT in place     Allergies:  No Known Allergies    Hospital Medications:   MEDICATIONS  (STANDING):  chlorhexidine 2% Cloths 1 Application(s) Topical <User Schedule>  heparin   Injectable 5000 Unit(s) SubCutaneous every 8 hours  lactated ringers. 1000 milliLiter(s) (125 mL/Hr) IV Continuous <Continuous>  metoprolol tartrate Injectable 5 milliGRAM(s) IV Push every 6 hours  pantoprazole  Injectable 40 milliGRAM(s) IV Push daily        VITALS:  T(F): 97.7 (09-08-22 @ 08:00), Max: 98.4 (09-07-22 @ 16:00)  HR: 85 (09-08-22 @ 08:00)  BP: 147/73 (09-07-22 @ 12:00)  RR: 20 (09-08-22 @ 08:00)  SpO2: 93% (09-08-22 @ 08:00)    09-06 @ 07:01  -  09-07 @ 07:00  --------------------------------------------------------  IN: 2800 mL / OUT: 2330 mL / NET: 470 mL    09-07 @ 07:01  -  09-08 @ 07:00  --------------------------------------------------------  IN: 3000 mL / OUT: 1725 mL / NET: 1275 mL          PHYSICAL EXAM:  Constitutional: NAD  Neck: No JVD  Respiratory: CTAB  Cardiovascular: S1, S2, RRR  Gastrointestinal: BS+, soft, NT/ND  Extremities: No cyanosis or clubbing. No peripheral edema  Skin: No rashes    LABS:  09-07    142  |  104  |  35<H>  ----------------------------<  96  4.3   |  27  |  1.9<H>    Creatinine Trend: 1.9<--, 2.4<--, 2.1<--, 2.1<--, 2.1<--, 2.2<--    Ca    8.3<L>      07 Sep 2022 21:36  Phos  3.3     09-07  Mg     2.0     09-07                            14.0   9.22  )-----------( 112      ( 07 Sep 2022 21:36 )             41.8       Urine Studies:    Creatinine, Random Urine: 146 mg/dL (09-05 @ 16:11)  Sodium, Random Urine: 30.0 mmoL/L (09-05 @ 16:11)      PTH -- (Ca 9.2)      [09-06-22 @ 22:19]   21          RADIOLOGY & ADDITIONAL STUDIES:

## 2022-09-08 NOTE — PROGRESS NOTE ADULT - ASSESSMENT
ASSESSMENT:  87M w/ PMH of CHF with AICD/PPM placement, HTN, and CKD presents with abdominal pain, found to have CT findings suspicious for closed loop obstruction, now s/p diagnostic laparoscopy with lysis of adhesions.     PLAN:   - Care per SICU   - Strict NPO, maintain NGT   - IVF   - Pain control    - Monitor for bowel function - having flatus but no BM   - Monitor UOP     [facility] [id number]

## 2022-09-08 NOTE — PROGRESS NOTE ADULT - ASSESSMENT
Assessment & Plan  87y Male w/ Closed loop bowel obstruction     NEURO:  Acute pain-controlled with IV Tylenol    RESP:   - Saturating well on RA   - Encourage IS  - AM CXR  - Activity- Ambulate as tolerated        CARDS:   #Hx HFrEF (15%) w/ AICD and PPM  - Continue Metoprolol 5 q6 IV (takes 100 BID at home)  - Holding home Lasix 40 PRN and Spironolactone 25mg MWF  - EF: 65% on repeat echo, trace MV regurg, mild TR   #Hx HTN  - Holding home losartan 100 daily  #Hx HLD  - Holding home Simvastatin 20 daily  #Lactic acidosis - resolved   - EKG: atrial sensed ventricular paced rhythm, biventricular pacemaker detected,   - Troponin neg x 3    GI/NUTR:   Closed loop bowel obstruction  - Diet: NPO  - GI Prophylaxis- PPI  - Bowel regimen- none  - NGT to LCWS  - CTAP 9/6:     /RENAL:   - Monitor UO-moses in place  - LR @125    Labs:          BUN/Cr- 36/2.4  -->,  35/1.9  -->          Electrolytes-Na 142 // K 4.3 // Mg 2.0 //  Phos 3.3  #CKD stage 3 (baseline Cr 1.8)  - Nephro consult for MELINDA on CKD with need for CT with IV contrast- obtain IP and PTH, hydrate with LR @ 1cc/kg/hr  #Testicular CA s/p orchiectomy  - Holding home Testosterone 20.25 MG/ACT (1.62%) Transdermal Gel (AndroGel Pump); APPLY TWO PUMP PRESSES ONE TIME DAILY AS DIRECTED MDD:2 pumps    HEME/ONC:   - DVT prophylaxis-heparin   Injectable, SCDs    Labs: Hb/Hct:  13.8/41 --> 14/41              Plts:  112              PTT/INR:  32.9/0.98  --->       ID:  WBC- 9.2  Temp trend- 24hrs T(F): 97.8 (09-04 @ 23:25), Max: 97.8 (09-04 @ 23:25)  Antibiotics- cefotetan perioperatively x 2 doses- complete           ENDO:  No chronic Hx  - FS q4 while NPO  - HA1C 5.5%    LINES/DRAINS:  PIV    DISPO:    SICU       Assessment & Plan  87y Male w/ Closed loop bowel obstruction     NEURO:  Acute pain-controlled with IV Tylenol    RESP:   - Saturating well on RA   - Encourage IS  - CXr 9/7: Enteric tube passing below the diaphragm.No focal consolidation, pleural effusion, or pneumothorax.  - Activity- Ambulate as tolerated    CARDS:   #Hx HFrEF (61% on 9/5) w/ AICD and PPM  - Continue Metoprolol 5 q6 IV (takes 100 BID at home)  - Holding home Lasix 40 PRN and Spironolactone 25mg MWF  - EF: 65% on repeat echo, trace MV regurg, mild TR   #Hx HTN  - Holding home losartan 100 daily  #Hx HLD  - Holding home Simvastatin 20 daily  #Lactic acidosis - resolved   - EKG: atrial sensed ventricular paced rhythm, biventricular pacemaker detected,   - Troponin neg x 3    GI/NUTR: 9  Closed loop bowel obstruction  - Diet: NPO  - GI Prophylaxis- PPI  - Bowel regimen- none  - NGT to LCWS  - CTAP 9/6:     /RENAL:   - Monitor UO-moses in place  - LR @125    Labs:          BUN/Cr- 36/2.4  -->,  35/1.9  -->          Electrolytes-Na 142 // K 4.3 // Mg 2.0 //  Phos 3.3  #CKD stage 3 (baseline Cr 1.8)  - Nephro consult for MELINDA on CKD with need for CT with IV contrast- obtain IP and PTH, hydrate with LR @ 1cc/kg/hr  #Testicular CA s/p orchiectomy  - Holding home Testosterone 20.25 MG/ACT (1.62%) Transdermal Gel (AndroGel Pump); APPLY TWO PUMP PRESSES ONE TIME DAILY AS DIRECTED MDD:2 pumps    HEME/ONC:   - DVT prophylaxis-heparin   Injectable, SCDs    Labs: Hb/Hct:  13.8/41 --> 14/41              Plts:  112              PTT/INR:  32.9/0.98  --->       ID:  WBC- 9.2  Temp trend- 24hrs T(F): 97.8 (09-04 @ 23:25), Max: 97.8 (09-04 @ 23:25)  Antibiotics- cefotetan perioperatively x 2 doses- complete           ENDO:  No chronic Hx  - FS q4 while NPO  - HA1C 5.5%    LINES/DRAINS:  PIV    DISPO:    SICU

## 2022-09-08 NOTE — PROGRESS NOTE ADULT - ATTENDING COMMENTS
doing well, vitals stable. cr decreasing to 1.9. painn cotntrol.
s/p laparoscopic repair of closed loop bowel obstruction. doing well. good urine output. follow closely.
stable, for CT and OR with primary team.

## 2022-09-08 NOTE — PROGRESS NOTE ADULT - SUBJECTIVE AND OBJECTIVE BOX
GENERAL SURGERY PROGRESS NOTE    Patient: JAMEE STATON , 87y (39)Male   MRN: [id number]  Location: [address] 011 A  Visit: 22 Inpatient  Date: 22 @ 00:47    Hospital Day #: 5  Post-Op Day #: 2    Events of past 24 hours:    Patient seen and examined at the bedside.  States pain controlled.  Denies fevers/chills.  Denies dizziness, lightheadedness, numbness, tingling, or weakness.  Denies chest pain and dyspnea.  NG Tube in place. Denies nausea/vomiting.  Endorses flatus but denies BM.  Dc'd moses. Passed TOV. Endorses voiding.  Endorses ambulating.      PAST MEDICAL & SURGICAL HISTORY:  Chronic CHF (congestive heart failure) EF 61% 22  Dyslipidemia    CKD (chronic kidney disease) stage 3, GFR 30-59 ml/min  (baseline Creat=1.8)    HTN (hypertension)    H/O testicular cancer      S/P implantation of automatic cardioverter/defibrillator (AICD)    History of pacemaker    Dyslipidemia    H/O excision of testicular mass  (Bilateral with LN excision in )      Vitals:   T(F): 98 (22 @ 00:16), Max: 98.4 (22 @ 16:00)  HR: 90 (22 @ 20:00)  BP: 147/73 (22 @ 12:00)  RR: 18 (22 @ 18:00)  SpO2: 96% (22 @ 20:00)      Diet, NPO      Fluids:     I & O's:    22 @ 07:01  -  22 @ 07:00  --------------------------------------------------------  IN:    IV PiggyBack: 50 mL    Lactated Ringers: 1250 mL    Lactated Ringers: 1000 mL    Lactated Ringers Bolus: 500 mL  Total IN: 2800 mL    OUT:    Indwelling Catheter - Urethral (mL): 1355 mL    Nasogastric/Oral tube (mL): 975 mL  Total OUT: 2330 mL    Total NET: 470 mL    PHYSICAL EXAM:  General: NAD.  Cardiac: RRR.  Respiratory: On RA. No accessory muscles of respiration in use.  Abdomen: Soft, non-distended, non-tender, no rebound, no guarding.   Neuro: Moves all extremities.  Vascular: Extremities well perfused.  Skin: Warm/dry, normal color, no jaundice.      MEDICATIONS  (STANDING):  chlorhexidine 2% Cloths 1 Application(s) Topical <User Schedule>  heparin   Injectable 5000 Unit(s) SubCutaneous every 8 hours  lactated ringers. 1000 milliLiter(s) (125 mL/Hr) IV Continuous <Continuous>  metoprolol tartrate Injectable 5 milliGRAM(s) IV Push every 6 hours  pantoprazole  Injectable 40 milliGRAM(s) IV Push daily    MEDICATIONS  (PRN):  acetaminophen   IVPB .. 1000 milliGRAM(s) IV Intermittent once PRN Mild Pain (1 - 3)  ondansetron Injectable 4 milliGRAM(s) IV Push every 8 hours PRN Nausea and/or Vomiting      DVT PROPHYLAXIS: heparin   Injectable 5000 Unit(s) SubCutaneous every 8 hours    GI PROPHYLAXIS: pantoprazole  Injectable 40 milliGRAM(s) IV Push daily    ANTICOAGULATION:   ANTIBIOTICS:        LAB/STUDIES:  Labs:  CAPILLARY BLOOD GLUCOSE                          14.0   9.22  )-----------( 112      ( 07 Sep 2022 21:36 )             41.8       Auto Neutrophil %: 83.2 % (22 @ 05:59)  Auto Immature Granulocyte %: 0.2 % (22 @ 05:59)        142  |  104  |  35<H>  ----------------------------<  96  4.3   |  27  |  1.9<H>      Calcium, Total Serum: 8.3 mg/dL (22 @ 21:36)      LFTs:     Lactate, Blood: 1.0 mmol/L (22 @ 22:23)  Lactate, Blood: 1.6 mmol/L (22 @ 05:56)  Lactate, Blood: 1.8 mmol/L (22 @ 11:48)  Lactate, Blood: 2.5 mmol/L (22 @ 05:59)      Coags:    CARDIAC MARKERS ( 07 Sep 2022 05:59 )  x     / <0.01 ng/mL / 113 U/L / x     / 3.4 ng/mL      IMAGIN/7 CXR - No acute cardiopulmonary abnormality. NGT in stomach.    ACCESS/ DEVICES:  [ ] Peripheral IV             GENERAL SURGERY PROGRESS NOTE    Patient: JAMEE STATON , 87y (39)Male   MRN: [id number]  Location: [address] 011 A  Visit: 22 Inpatient  Date: 22 @ 00:47    Hospital Day #: 5  Post-Op Day #: 2    Events of past 24 hours:    Patient seen and examined at the bedside.  States pain controlled.  Denies fevers/chills.  Denies dizziness, lightheadedness, numbness, tingling, or weakness.  Denies chest pain and dyspnea.  NG Tube in place. Denies nausea/vomiting.  Endorses flatus but denies BM.  Dc'd moses. Passed TOV. Endorses voiding.  Endorses ambulating.      PAST MEDICAL & SURGICAL HISTORY:  Chronic CHF (congestive heart failure) EF 61% 22  Dyslipidemia    CKD (chronic kidney disease) stage 3, GFR 30-59 ml/min  (baseline Creat=1.8)    HTN (hypertension)    H/O testicular cancer      S/P implantation of automatic cardioverter/defibrillator (AICD)    History of pacemaker    Dyslipidemia    H/O excision of testicular mass  (Bilateral with LN excision in )      Vitals:   T(F): 98 (22 @ 00:16), Max: 98.4 (22 @ 16:00)  HR: 90 (22 @ 20:00)  BP: 147/73 (22 @ 12:00)  RR: 18 (22 @ 18:00)  SpO2: 96% (22 @ 20:00)      Diet, NPO      Fluids:     I & O's:    22 @ 07:01  -  22 @ 07:00  --------------------------------------------------------  IN:    IV PiggyBack: 50 mL    Lactated Ringers: 1250 mL    Lactated Ringers: 1000 mL    Lactated Ringers Bolus: 500 mL  Total IN: 2800 mL    OUT:    Indwelling Catheter - Urethral (mL): 1355 mL    Nasogastric/Oral tube (mL): 975 mL  Total OUT: 2330 mL    Total NET: 470 mL    PHYSICAL EXAM:  General: NAD.  Cardiac: RRR.  Respiratory: On RA. No accessory muscles of respiration in use.  Abdomen: Soft, non-distended, non-tender, no rebound, no guarding.   Neuro: Moves all extremities.  Vascular: Extremities well perfused.  Skin: Warm/dry, normal color, no jaundice.      MEDICATIONS  (STANDING):  chlorhexidine 2% Cloths 1 Application(s) Topical <User Schedule>  heparin   Injectable 5000 Unit(s) SubCutaneous every 8 hours  lactated ringers. 1000 milliLiter(s) (125 mL/Hr) IV Continuous <Continuous>  metoprolol tartrate Injectable 5 milliGRAM(s) IV Push every 6 hours  pantoprazole  Injectable 40 milliGRAM(s) IV Push daily    MEDICATIONS  (PRN):  acetaminophen   IVPB .. 1000 milliGRAM(s) IV Intermittent once PRN Mild Pain (1 - 3)  ondansetron Injectable 4 milliGRAM(s) IV Push every 8 hours PRN Nausea and/or Vomiting      DVT PROPHYLAXIS: heparin   Injectable 5000 Unit(s) SubCutaneous every 8 hours    GI PROPHYLAXIS: pantoprazole  Injectable 40 milliGRAM(s) IV Push daily    ANTICOAGULATION:   ANTIBIOTICS:        LAB/STUDIES:  Labs:  CAPILLARY BLOOD GLUCOSE                          14.0   9.22  )-----------( 112      ( 07 Sep 2022 21:36 )             41.8       Auto Neutrophil %: 83.2 % (22 @ 05:59)  Auto Immature Granulocyte %: 0.2 % (22 @ 05:59)        142  |  104  |  35<H>  ----------------------------<  96  4.3   |  27  |  1.9<H>      Calcium, Total Serum: 8.3 mg/dL (22 @ 21:36)      LFTs:     Lactate, Blood: 1.0 mmol/L (22 @ 22:23)  Lactate, Blood: 1.6 mmol/L (22 @ 05:56)  Lactate, Blood: 1.8 mmol/L (22 @ 11:48)  Lactate, Blood: 2.5 mmol/L (22 @ 05:59)      Coags:    CARDIAC MARKERS ( 07 Sep 2022 05:59 )  x     / <0.01 ng/mL / 113 U/L / x     / 3.4 ng/mL      IMAGIN/7 CXR - No acute cardiopulmonary abnormality. NGT in stomach.    ACCESS/ DEVICES:  Peripheral IV  Arterial Lines

## 2022-09-09 LAB
ANION GAP SERPL CALC-SCNC: 15 MMOL/L — HIGH (ref 7–14)
ANION GAP SERPL CALC-SCNC: 8 MMOL/L — SIGNIFICANT CHANGE UP (ref 7–14)
BASOPHILS # BLD AUTO: 0.01 K/UL — SIGNIFICANT CHANGE UP (ref 0–0.2)
BASOPHILS # BLD AUTO: 0.03 K/UL — SIGNIFICANT CHANGE UP (ref 0–0.2)
BASOPHILS NFR BLD AUTO: 0.1 % — SIGNIFICANT CHANGE UP (ref 0–1)
BASOPHILS NFR BLD AUTO: 0.4 % — SIGNIFICANT CHANGE UP (ref 0–1)
BUN SERPL-MCNC: 33 MG/DL — HIGH (ref 10–20)
BUN SERPL-MCNC: 38 MG/DL — HIGH (ref 10–20)
CALCIUM SERPL-MCNC: 8.4 MG/DL — LOW (ref 8.5–10.1)
CALCIUM SERPL-MCNC: 8.7 MG/DL — SIGNIFICANT CHANGE UP (ref 8.5–10.1)
CHLORIDE SERPL-SCNC: 103 MMOL/L — SIGNIFICANT CHANGE UP (ref 98–110)
CHLORIDE SERPL-SCNC: 109 MMOL/L — SIGNIFICANT CHANGE UP (ref 98–110)
CO2 SERPL-SCNC: 19 MMOL/L — SIGNIFICANT CHANGE UP (ref 17–32)
CO2 SERPL-SCNC: 28 MMOL/L — SIGNIFICANT CHANGE UP (ref 17–32)
CREAT SERPL-MCNC: 1.8 MG/DL — HIGH (ref 0.7–1.5)
CREAT SERPL-MCNC: 2.2 MG/DL — HIGH (ref 0.7–1.5)
EGFR: 29 ML/MIN/1.73M2 — LOW
EGFR: 36 ML/MIN/1.73M2 — LOW
EOSINOPHIL # BLD AUTO: 0.11 K/UL — SIGNIFICANT CHANGE UP (ref 0–0.7)
EOSINOPHIL # BLD AUTO: 0.21 K/UL — SIGNIFICANT CHANGE UP (ref 0–0.7)
EOSINOPHIL NFR BLD AUTO: 1.4 % — SIGNIFICANT CHANGE UP (ref 0–8)
EOSINOPHIL NFR BLD AUTO: 2.6 % — SIGNIFICANT CHANGE UP (ref 0–8)
GLUCOSE BLDC GLUCOMTR-MCNC: 106 MG/DL — HIGH (ref 70–99)
GLUCOSE BLDC GLUCOMTR-MCNC: 109 MG/DL — HIGH (ref 70–99)
GLUCOSE BLDC GLUCOMTR-MCNC: 124 MG/DL — HIGH (ref 70–99)
GLUCOSE BLDC GLUCOMTR-MCNC: 126 MG/DL — HIGH (ref 70–99)
GLUCOSE BLDC GLUCOMTR-MCNC: 98 MG/DL — SIGNIFICANT CHANGE UP (ref 70–99)
GLUCOSE SERPL-MCNC: 104 MG/DL — HIGH (ref 70–99)
GLUCOSE SERPL-MCNC: 119 MG/DL — HIGH (ref 70–99)
HCT VFR BLD CALC: 42.1 % — SIGNIFICANT CHANGE UP (ref 42–52)
HCT VFR BLD CALC: 42.3 % — SIGNIFICANT CHANGE UP (ref 42–52)
HGB BLD-MCNC: 14.1 G/DL — SIGNIFICANT CHANGE UP (ref 14–18)
HGB BLD-MCNC: 14.4 G/DL — SIGNIFICANT CHANGE UP (ref 14–18)
IMM GRANULOCYTES NFR BLD AUTO: 0.3 % — SIGNIFICANT CHANGE UP (ref 0.1–0.3)
IMM GRANULOCYTES NFR BLD AUTO: 0.4 % — HIGH (ref 0.1–0.3)
LYMPHOCYTES # BLD AUTO: 0.78 K/UL — LOW (ref 1.2–3.4)
LYMPHOCYTES # BLD AUTO: 1 K/UL — LOW (ref 1.2–3.4)
LYMPHOCYTES # BLD AUTO: 10.3 % — LOW (ref 20.5–51.1)
LYMPHOCYTES # BLD AUTO: 12.5 % — LOW (ref 20.5–51.1)
MAGNESIUM SERPL-MCNC: 1.9 MG/DL — SIGNIFICANT CHANGE UP (ref 1.8–2.4)
MAGNESIUM SERPL-MCNC: 2.2 MG/DL — SIGNIFICANT CHANGE UP (ref 1.8–2.4)
MCHC RBC-ENTMCNC: 32.8 PG — HIGH (ref 27–31)
MCHC RBC-ENTMCNC: 33.3 G/DL — SIGNIFICANT CHANGE UP (ref 32–37)
MCHC RBC-ENTMCNC: 33.5 PG — HIGH (ref 27–31)
MCHC RBC-ENTMCNC: 34.2 G/DL — SIGNIFICANT CHANGE UP (ref 32–37)
MCV RBC AUTO: 97.9 FL — HIGH (ref 80–94)
MCV RBC AUTO: 98.4 FL — HIGH (ref 80–94)
MONOCYTES # BLD AUTO: 0.81 K/UL — HIGH (ref 0.1–0.6)
MONOCYTES # BLD AUTO: 0.85 K/UL — HIGH (ref 0.1–0.6)
MONOCYTES NFR BLD AUTO: 10.6 % — HIGH (ref 1.7–9.3)
MONOCYTES NFR BLD AUTO: 10.7 % — HIGH (ref 1.7–9.3)
NEUTROPHILS # BLD AUTO: 5.86 K/UL — SIGNIFICANT CHANGE UP (ref 1.4–6.5)
NEUTROPHILS # BLD AUTO: 5.89 K/UL — SIGNIFICANT CHANGE UP (ref 1.4–6.5)
NEUTROPHILS NFR BLD AUTO: 73.5 % — SIGNIFICANT CHANGE UP (ref 42.2–75.2)
NEUTROPHILS NFR BLD AUTO: 77.2 % — HIGH (ref 42.2–75.2)
NRBC # BLD: 0 /100 WBCS — SIGNIFICANT CHANGE UP (ref 0–0)
NRBC # BLD: 0 /100 WBCS — SIGNIFICANT CHANGE UP (ref 0–0)
PHOSPHATE SERPL-MCNC: 2.8 MG/DL — SIGNIFICANT CHANGE UP (ref 2.1–4.9)
PHOSPHATE SERPL-MCNC: 3.4 MG/DL — SIGNIFICANT CHANGE UP (ref 2.1–4.9)
PLATELET # BLD AUTO: 122 K/UL — LOW (ref 130–400)
PLATELET # BLD AUTO: 140 K/UL — SIGNIFICANT CHANGE UP (ref 130–400)
POTASSIUM SERPL-MCNC: 4.6 MMOL/L — SIGNIFICANT CHANGE UP (ref 3.5–5)
POTASSIUM SERPL-MCNC: 4.6 MMOL/L — SIGNIFICANT CHANGE UP (ref 3.5–5)
POTASSIUM SERPL-SCNC: 4.6 MMOL/L — SIGNIFICANT CHANGE UP (ref 3.5–5)
POTASSIUM SERPL-SCNC: 4.6 MMOL/L — SIGNIFICANT CHANGE UP (ref 3.5–5)
RBC # BLD: 4.3 M/UL — LOW (ref 4.7–6.1)
RBC # BLD: 4.3 M/UL — LOW (ref 4.7–6.1)
RBC # FLD: 13 % — SIGNIFICANT CHANGE UP (ref 11.5–14.5)
RBC # FLD: 13.2 % — SIGNIFICANT CHANGE UP (ref 11.5–14.5)
SODIUM SERPL-SCNC: 139 MMOL/L — SIGNIFICANT CHANGE UP (ref 135–146)
SODIUM SERPL-SCNC: 143 MMOL/L — SIGNIFICANT CHANGE UP (ref 135–146)
WBC # BLD: 7.59 K/UL — SIGNIFICANT CHANGE UP (ref 4.8–10.8)
WBC # BLD: 8.01 K/UL — SIGNIFICANT CHANGE UP (ref 4.8–10.8)
WBC # FLD AUTO: 7.59 K/UL — SIGNIFICANT CHANGE UP (ref 4.8–10.8)
WBC # FLD AUTO: 8.01 K/UL — SIGNIFICANT CHANGE UP (ref 4.8–10.8)

## 2022-09-09 RX ORDER — BACITRACIN ZINC 500 UNIT/G
1 OINTMENT IN PACKET (EA) TOPICAL ONCE
Refills: 0 | Status: COMPLETED | OUTPATIENT
Start: 2022-09-09 | End: 2022-09-09

## 2022-09-09 RX ORDER — MAGNESIUM SULFATE 500 MG/ML
2 VIAL (ML) INJECTION ONCE
Refills: 0 | Status: COMPLETED | OUTPATIENT
Start: 2022-09-09 | End: 2022-09-09

## 2022-09-09 RX ADMIN — CHLORHEXIDINE GLUCONATE 1 APPLICATION(S): 213 SOLUTION TOPICAL at 05:20

## 2022-09-09 RX ADMIN — Medication 100 MILLIGRAM(S): at 05:21

## 2022-09-09 RX ADMIN — Medication 100 MILLIGRAM(S): at 17:50

## 2022-09-09 RX ADMIN — LOSARTAN POTASSIUM 100 MILLIGRAM(S): 100 TABLET, FILM COATED ORAL at 05:20

## 2022-09-09 RX ADMIN — HEPARIN SODIUM 5000 UNIT(S): 5000 INJECTION INTRAVENOUS; SUBCUTANEOUS at 21:37

## 2022-09-09 RX ADMIN — HEPARIN SODIUM 5000 UNIT(S): 5000 INJECTION INTRAVENOUS; SUBCUTANEOUS at 14:02

## 2022-09-09 RX ADMIN — Medication 1 APPLICATION(S): at 05:57

## 2022-09-09 RX ADMIN — HEPARIN SODIUM 5000 UNIT(S): 5000 INJECTION INTRAVENOUS; SUBCUTANEOUS at 05:20

## 2022-09-09 RX ADMIN — PANTOPRAZOLE SODIUM 40 MILLIGRAM(S): 20 TABLET, DELAYED RELEASE ORAL at 05:21

## 2022-09-09 RX ADMIN — Medication 25 GRAM(S): at 03:28

## 2022-09-09 RX ADMIN — Medication 62.5 MILLIMOLE(S): at 03:29

## 2022-09-09 NOTE — PROVIDER CONTACT NOTE (OTHER) - ASSESSMENT
IV flushed and working well but causing discomfort. IV removed. Site appears reddened and blistered under tape.

## 2022-09-09 NOTE — PROGRESS NOTE ADULT - NS ATTEND OPT1 GEN_ALL_CORE

## 2022-09-09 NOTE — PROGRESS NOTE ADULT - ASSESSMENT
87M w/ PMH of CHF with AICD/PPM placement, HTN, and CKD presents with abdominal pain, found to have CT findings suspicious for closed loop obstruction, now s/p diagnostic laparoscopy with lysis of adhesions.     PLAN:    -Continue CLD   - IVF   - Pain control   -Encourage OOB, ambulation  -Passed TOV   - Monitor UOP     [facility] S[facility]RA [id number]

## 2022-09-09 NOTE — PROGRESS NOTE ADULT - NS ATTEND AMEND GEN_ALL_CORE FT
Patient is an 87M with PMH of CHF, AICD, PPM, HTN, CKD III, testicular cancer s/p resection and lymph node dissection in 1988 presents with one day of abdominal pain.  Found on CT to have SBO with possible closed loop.    POD 1 s/p laparoscopic lysis of adhesions    Patient seen and examined.  Patient reports minimal abdominal pain. Denies flatus BM    Abdomen - soft, non distended, appropriately tender.  Dermabond in place.     Vitals labs and images reviewed    Plan  - NPO  - IVF  - NGT  - DC moses  - TOV  - OOB ambulate  - pain control  - f/u bowel function
Patient is an 87M with PMH of CHF, AICD, PPM, HTN, CKD III, testicular cancer s/p resection and lymph node dissection in 1988 presents with one day of abdominal pain.  Found on CT to have SBO with possible closed loop.    Patient seen and examined.  Patient reports dull ache in his abdomen improve from presentation.  Denies flatus BM    Abdomen - soft, non distended, non tender.  No rebound or guarding    Vitals labs and images reviewed    Plan  - NPO  - IVF  - NGT  - trend lactate  - cardiac evaluation  - serial abdominal exams.  - I had a long conversation with the patient and his wife.  I do not believe he has a closed loop obstruction given his improving condition with hydration. We discussed that we will attempt NGT decompression and that he may require surgery to relieve his obstruction.  We discussed that he is high risk for surgery.
Patient is an 87M with PMH of CHF, AICD, PPM, HTN, CKD III, testicular cancer s/p resection and lymph node dissection in 1988 presents with one day of abdominal pain.  Found on CT to have SBO with possible closed loop.    POD 3 s/p laparoscopic lysis of adhesions    Patient seen and examined.  Patient denies pain. He is passing flatus with BM.  Tolerating clear liquids.    Abdomen - soft, non distended, appropriately tender.  Dermabond in place.     Vitals labs and images reviewed    Plan  - low fiber diet  - IVF  - OOB ambulate  - pain control  - possible DC home in 24 hours
Patient is an 87M with PMH of CHF, AICD, PPM, HTN, CKD III, testicular cancer s/p resection and lymph node dissection in 1988 presents with one day of abdominal pain.  Found on CT to have SBO with possible closed loop.    Patient seen and examined.  Patient reports increasing intermittent pain today. Denies flatus BM    Abdomen - soft, non distended, non tender.  No rebound or guarding    Vitals labs and images reviewed    Plan  - NPO  - IVF  - NGT  - trend lactate - improved  - Cardiology recs - moderate-high risk patient with limited functional capacity undergoing a moderate risk surgery RCRI score 2, class III, estimated risk of major adverse cardiac event (MACE) is about 10.1%.  - serial abdominal exams.  - I spoke again with the patient about his condition.  He has not had return of bowel function and his pain is worsening.  We discussed surgery today for laparoscopic possible open lysis of adhesions possible bowel resection possible ostomy.  We discussed that his cardiac condition is better than previously thought and his risk is as stated by cardiology.  Patient stated he would like to discuss with his family prior to agreeing to surgery.  He requested repeat imaging to evaluate for interval change.  - repeat CTAP
Patient is an 87M with PMH of CHF, AICD, PPM, HTN, CKD III, testicular cancer s/p resection and lymph node dissection in 1988 presents with one day of abdominal pain.  Found on CT to have SBO with possible closed loop.    POD 2 s/p laparoscopic lysis of adhesions    Patient seen and examined.  Patient denies pain. He is passing flatus but denies  BM    Abdomen - soft, non distended, appropriately tender.  Dermabond in place.     Vitals labs and images reviewed    Plan  - NPO  - IVF  - NGT  - OOB ambulate
here for observation for possible closed loop obstruction. npo, ng.

## 2022-09-09 NOTE — PROGRESS NOTE ADULT - SUBJECTIVE AND OBJECTIVE BOX
GENERAL SURGERY PROGRESS NOTE    Patient: JAMEE STATON , 87y ([birth year])Male   MRN: [id number]  Location: [address]  Visit: 09-04-22 Inpatient  Date: 09-09-22 @ 02:45    Hospital Day #: 6  Post-Op Day #:3    Procedure/Dx/Injuries: closed loop SBO s/p diagnostic lap with GLENN    Events of past 24 hours:  Patient seen and examined at bedside  AIEON  Patient reports BM, flatus  Denies nausea vomiting  Patient OOB ambulating  Tolerating diet well    PAST MEDICAL & SURGICAL HISTORY:  Chronic CHF (congestive heart failure)  (EF=15)      Dyslipidemia      CKD (chronic kidney disease) stage 3, GFR 30-59 ml/min  (baseline Creat=1.8)      HTN (hypertension)      H/O testicular cancer  1988      S/P implantation of automatic cardioverter/defibrillator (AICD)      History of pacemaker      Dyslipidemia      H/O excision of testicular mass  (Bilateral with LN excision in 1988)          Vitals:   T(F): 98.2 (09-09-22 @ 00:30), Max: 98.2 (09-08-22 @ 12:00)  HR: 91 (09-09-22 @ 00:30)  BP: 126/72 (09-09-22 @ 00:30)  RR: 18 (09-09-22 @ 00:30)  SpO2: 94% (09-09-22 @ 00:30)      Diet, Clear Liquid      Fluids: lactated ringers.: Solution, 1000 milliLiter(s) infuse at 75 mL/Hr  Provider's Contact #: [phone]      I & O's:    09-07-22 @ 07:01  -  09-08-22 @ 07:00  --------------------------------------------------------  IN:    Lactated Ringers: 3000 mL  Total IN: 3000 mL    OUT:    Indwelling Catheter - Urethral (mL): 390 mL    Nasogastric/Oral tube (mL): 375 mL    Voided (mL): 960 mL  Total OUT: 1725 mL    Total NET: 1275 mL      PHYSICAL EXAM:  General: NAD.  Cardiac: RRR.  Respiratory: On RA. No accessory muscles of respiration in use.  Abdomen: Soft, non-distended, non-tender, no rebound, no guarding.   Neuro: Moves all extremities.  Vascular: Extremities well perfused.  Skin: Warm/dry, normal color, no jaundice.    MEDICATIONS  (STANDING):  chlorhexidine 2% Cloths 1 Application(s) Topical <User Schedule>  heparin   Injectable 5000 Unit(s) SubCutaneous every 8 hours  lactated ringers. 1000 milliLiter(s) (75 mL/Hr) IV Continuous <Continuous>  losartan 100 milliGRAM(s) Oral daily  metoprolol tartrate 100 milliGRAM(s) Oral two times a day  pantoprazole    Tablet 40 milliGRAM(s) Oral before breakfast    MEDICATIONS  (PRN):  acetaminophen     Tablet .. 650 milliGRAM(s) Oral every 4 hours PRN Temp greater or equal to 38C (100.4F), Moderate Pain (4 - 6)  ondansetron Injectable 4 milliGRAM(s) IV Push every 8 hours PRN Nausea and/or Vomiting      DVT PROPHYLAXIS: heparin   Injectable 5000 Unit(s) SubCutaneous every 8 hours    GI PROPHYLAXIS: pantoprazole    Tablet 40 milliGRAM(s) Oral before breakfast    ANTICOAGULATION:   ANTIBIOTICS:            LAB/STUDIES:  Labs:  CAPILLARY BLOOD GLUCOSE      POCT Blood Glucose.: 106 mg/dL (09 Sep 2022 00:06)  POCT Blood Glucose.: 98 mg/dL (08 Sep 2022 21:16)  POCT Blood Glucose.: 94 mg/dL (08 Sep 2022 05:38)                          14.4   7.59  )-----------( 122      ( 09 Sep 2022 00:45 )             42.1       Auto Neutrophil %: 77.2 % (09-09-22 @ 00:45)  Auto Immature Granulocyte %: 0.3 % (09-09-22 @ 00:45)    09-09    143  |  109  |  33<H>  ----------------------------<  104<H>  4.6   |  19  |  1.8<H>      Calcium, Total Serum: 8.4 mg/dL (09-09-22 @ 00:45)      LFTs:     Lactate, Blood: 1.0 mmol/L (09-07-22 @ 22:23)  Lactate, Blood: 1.6 mmol/L (09-06-22 @ 05:56)      Coags:    CARDIAC MARKERS ( 07 Sep 2022 05:59 )  x     / <0.01 ng/mL / 113 U/L / x     / 3.4 ng/mL

## 2022-09-10 ENCOUNTER — TRANSCRIPTION ENCOUNTER (OUTPATIENT)
Age: 87
End: 2022-09-10

## 2022-09-10 VITALS
SYSTOLIC BLOOD PRESSURE: 124 MMHG | TEMPERATURE: 99 F | HEART RATE: 66 BPM | RESPIRATION RATE: 16 BRPM | DIASTOLIC BLOOD PRESSURE: 69 MMHG

## 2022-09-10 LAB
GLUCOSE BLDC GLUCOMTR-MCNC: 101 MG/DL — HIGH (ref 70–99)
GLUCOSE BLDC GLUCOMTR-MCNC: 104 MG/DL — HIGH (ref 70–99)

## 2022-09-10 PROCEDURE — 99238 HOSP IP/OBS DSCHRG MGMT 30/<: CPT

## 2022-09-10 RX ORDER — ACETAMINOPHEN 500 MG
2 TABLET ORAL
Qty: 0 | Refills: 0 | DISCHARGE
Start: 2022-09-10

## 2022-09-10 RX ADMIN — CHLORHEXIDINE GLUCONATE 1 APPLICATION(S): 213 SOLUTION TOPICAL at 05:09

## 2022-09-10 RX ADMIN — Medication 100 MILLIGRAM(S): at 05:12

## 2022-09-10 RX ADMIN — LOSARTAN POTASSIUM 100 MILLIGRAM(S): 100 TABLET, FILM COATED ORAL at 05:12

## 2022-09-10 RX ADMIN — PANTOPRAZOLE SODIUM 40 MILLIGRAM(S): 20 TABLET, DELAYED RELEASE ORAL at 05:12

## 2022-09-10 NOTE — DISCHARGE NOTE PROVIDER - NSDCFUSCHEDAPPT_GEN_ALL_CORE_FT
[facility]  CARDIOLOGY [address] [facility]  Scheduled Appointment: 09/12/2022    Matthew Garces  [facility]  UROLOGY [address] [facility]  Scheduled Appointment: 10/12/2022

## 2022-09-10 NOTE — PROGRESS NOTE ADULT - ASSESSMENT
ASSESSMENT:  87M w/ PMH of CHF with AICD/PPM placement, HTN, and CKD presents with abdominal pain, found to have CT findings suspicious for closed loop obstruction, now s/p diagnostic laparoscopy with lysis of adhesions.     PLAN:   - Regular diet   - If continues to tolerate diet, consider discharge today   - Pain control    - Encourage OOB, ambulation   - Monitor UOP   - Monitor BM/F     [facility] SPECTRA [id number]

## 2022-09-10 NOTE — DISCHARGE NOTE PROVIDER - CARE PROVIDER_API CALL
Junior Geronimo)  ColonRectal Surgery; Surgery  [address] [facility], [address] [place] [address]  Phone: [phone]  Fax: [phone]  Follow Up Time:

## 2022-09-10 NOTE — DISCHARGE NOTE PROVIDER - NSDCMRMEDTOKEN_GEN_ALL_CORE_FT
AndroGel Pump 1.25 g/actuation:   Lasix 40 mg oral tablet: 1 tab(s) orally once a day, As Needed  losartan 100 mg oral tablet: 1 tab(s) orally once a day  Metoprolol Tartrate 100 mg oral tablet: 1 tab(s) orally 2 times a day  simvastatin 20 mg oral tablet: 1 tab(s) orally once a day (at bedtime)  spironolactone 25 mg oral tablet: 1 tab(s) orally Monday, Wednesday, and Friday   acetaminophen 325 mg oral tablet: 2 tab(s) orally every 4 hours, As needed, Temp greater or equal to 38C (100.4F), Moderate Pain (4 - 6)  AndroGel Pump 1.25 g/actuation:   Lasix 40 mg oral tablet: 1 tab(s) orally once a day, As Needed  losartan 100 mg oral tablet: 1 tab(s) orally once a day  Metoprolol Tartrate 100 mg oral tablet: 1 tab(s) orally 2 times a day  simvastatin 20 mg oral tablet: 1 tab(s) orally once a day (at bedtime)  spironolactone 25 mg oral tablet: 1 tab(s) orally Monday, Wednesday, and Friday

## 2022-09-10 NOTE — PROGRESS NOTE ADULT - REASON FOR ADMISSION
Closed loop obstruction

## 2022-09-10 NOTE — DISCHARGE NOTE PROVIDER - NSDCFUADDINST_GEN_ALL_CORE_FT
Follow up with Dr Geronimo in 1 week call office for appointment   follow up with your PMD/ cardiology    no strenuous activity  keep wound clean and dry  no tub bath  resume home medications    if experience fever shortness of breath, chest pain, dizziness, vomiting , abdominal pain, bleeding or drainage from wound call MD or return to ED

## 2022-09-10 NOTE — DISCHARGE NOTE PROVIDER - HOSPITAL COURSE
This is a 87y Male w/ PMH HFrEF (15% s/p AICD & PPM), HTN, CKD stage 3 (baseline Cr 1.8), remote hx testicular CA s/p removal of B/L testicles and left lymph node excision in 1988, new bladder calculus presents w/ Closed loop bowel obstruction. Patient presented to the ED with a one day history of lower abdominal pain L>R. Patient states pain started mildly and then progressed to severe pain 8/10 which prompted visit to the ER. pain was sharp and crampy associated with an episode of non-bloody, non-bilious vomiting today. Patient reports last BM on day of admission. CT scan was done showing closed loop bowel obstruction. Lactate on admission 3.7. pt upgraded to SICU for close monitoring. NGT  placed pt kept NPO and treated medically and evaluated by cardiology and EP and nephology.    This is a 87y Male w/ PMH HFrEF (15% s/p AICD & PPM), HTN, CKD stage 3 (baseline Cr 1.8), remote hx testicular CA s/p removal of B/L testicles and left lymph node excision in 1988, new bladder calculus presents w/ Closed loop bowel obstruction. Patient presented to the ED with a one day history of lower abdominal pain L>R. Patient states pain started mildly and then progressed to severe pain 8/10 which prompted visit to the ER. pain was sharp and crampy associated with an episode of non-bloody, non-bilious vomiting today. Patient reports last BM on day of admission. CT scan was done showing closed loop bowel obstruction. Lactate on admission 3.7. pt upgraded to SICU for close monitoring. NGT  placed pt kept NPO and treated medically and evaluated by cardiology and EP and nephrology.   On 9/7/22 pt underwent diagnostic laparoscopy with lysis of adhesion . Post operatively pt doing well and was downgraded to floor.  On 9/10/22 pt without complaints, tolerated po diet, voided and ambulated. vital signs stable and afebrile. pt doing well and discharged home in stable condition.   pt advised to follow up with Dr Geronimo in 1 week. Resume home medications. precaution provided   This is a 87y Male w/ PMH HFrEF (15% s/p AICD & PPM), HTN, CKD stage 3 (baseline Cr 1.8), remote hx testicular CA s/p removal of B/L testicles and left lymph node excision in 1988, new bladder calculus presents w/ Closed loop bowel obstruction. Patient presented to the ED with a one day history of lower abdominal pain L>R. Patient states pain started mildly and then progressed to severe pain 8/10 which prompted visit to the ER. pain was sharp and crampy associated with an episode of non-bloody, non-bilious vomiting today. Patient reports last BM on day of admission. CT scan was done showing closed loop bowel obstruction. Lactate on admission 3.7. pt upgraded to SICU for close monitoring. NGT  placed pt kept NPO and treated medically and evaluated by cardiology and EP and nephrology.   On 9/7/22 pt underwent diagnostic laparoscopy with lysis of adhesion . Post operatively pt doing well and was downgraded to floor.  On 9/10/22 pt without complaints, tolerated po diet, voided and ambulated. vital signs stable and afebrile. pt doing well and discharged home in stable condition.   pt advised to follow up with Dr Geronimo in 1 week. Resume home medications. Instructions provided

## 2022-09-10 NOTE — DISCHARGE NOTE PROVIDER - NSDCCPTREATMENT_GEN_ALL_CORE_FT
PRINCIPAL PROCEDURE  Procedure: Diagnostic laparoscopy  Findings and Treatment:       SECONDARY PROCEDURE  Procedure: Laparoscopic enterolysis  Findings and Treatment:

## 2022-09-10 NOTE — DISCHARGE NOTE NURSING/CASE MANAGEMENT/SOCIAL WORK - PATIENT PORTAL LINK FT
You can access the FollowMyHealth Patient Portal offered by [facility] by registering at the following website: [email or web address]. By joining [facility]’s FollowMyHealth portal, you will also be able to view your health information using other applications (apps) compatible with our system.

## 2022-09-10 NOTE — DISCHARGE NOTE NURSING/CASE MANAGEMENT/SOCIAL WORK - NSDCPEFALRISK_GEN_ALL_CORE
For information on Fall & Injury Prevention, visit: [email or web address] OR  [email or web address] OR  https://www.cdc.gov/steadi/patient.html

## 2022-09-10 NOTE — PROGRESS NOTE ADULT - SUBJECTIVE AND OBJECTIVE BOX
GENERAL SURGERY PROGRESS NOTE    Hospital Day: 7  Post operative day: 4  Procedure: Diagnostic laparoscopy with lysis of adhesions    24 Hour Events:  No acute events  Tolerating diet, N/V(-)  BM+/F+  OOB, walking around 4C x 3 unassisted  Pain controlled  No complaints    Vitals:  T(F): 98.6 (09-10-22 @ 00:20), Max: 98.6 (09-10-22 @ 00:20)  HR: 73 (09-10-22 @ 00:20)  BP: 111/69 (09-10-22 @ 00:20)  RR: 18 (09-10-22 @ 00:20)  SpO2: 95% (09-10-22 @ 00:20)    Diet, Low Fiber:   DASH/TLC Sodium & Cholesterol Restricted    Fluids:     I & O's:    09-08-22 @ 07:01  -  09-09-22 @ 07:00  --------------------------------------------------------  IN:    Lactated Ringers: 1125 mL    Lactated Ringers: 675 mL  Total IN: 1800 mL    OUT:    Nasogastric/Oral tube (mL): 350 mL    Voided (mL): 250 mL  Total OUT: 600 mL    Total NET: 1200 mL    PHYSICAL EXAM:  General: NAD  Cardiac: RRR, S1/S2 identified, nmrg  Respiratory: unlabored breathing at rest, clear to auscultation bilaterally  Abdomen: Soft, non-distended, non-tender, no rebound, no guarding.  Musculoskeletal: FROM in b/l UE and LE  Neuro: Sensation grossly intact and equal throughout, no focal deficits  Skin: Warm/dry, normal color, no jaundice  Incision/wound: Clean, dry, and intact    MEDICATIONS  (STANDING):  chlorhexidine 2% Cloths 1 Application(s) Topical <User Schedule>  heparin   Injectable 5000 Unit(s) SubCutaneous every 8 hours  losartan 100 milliGRAM(s) Oral daily  metoprolol tartrate 100 milliGRAM(s) Oral two times a day  pantoprazole    Tablet 40 milliGRAM(s) Oral before breakfast    MEDICATIONS  (PRN):  acetaminophen     Tablet .. 650 milliGRAM(s) Oral every 4 hours PRN Temp greater or equal to 38C (100.4F), Moderate Pain (4 - 6)  ondansetron Injectable 4 milliGRAM(s) IV Push every 8 hours PRN Nausea and/or Vomiting    DVT PROPHYLAXIS: heparin   Injectable 5000 Unit(s) SubCutaneous every 8 hours    GI PROPHYLAXIS: pantoprazole    Tablet 40 milliGRAM(s) Oral before breakfast    ANTICOAGULATION:   ANTIBIOTICS:      LAB/STUDIES:  Labs:  CAPILLARY BLOOD GLUCOSE  POCT Blood Glucose.: 101 mg/dL (10 Sep 2022 01:30)  POCT Blood Glucose.: 124 mg/dL (09 Sep 2022 21:29)  POCT Blood Glucose.: 109 mg/dL (09 Sep 2022 16:37)  POCT Blood Glucose.: 126 mg/dL (09 Sep 2022 11:47)                          14.1   8.01  )-----------( 140      ( 09 Sep 2022 21:27 )             42.3       Auto Neutrophil %: 73.5 % (09-09-22 @ 21:27)  Auto Immature Granulocyte %: 0.4 % (09-09-22 @ 21:27)    09-09    139  |  103  |  38<H>  ----------------------------<  119<H>  4.6   |  28  |  2.2<H>      Calcium, Total Serum: 8.7 mg/dL (09-09-22 @ 21:27)      LFTs:     Lactate, Blood: 1.0 mmol/L (09-07-22 @ 22:23)

## 2022-09-12 ENCOUNTER — NON-APPOINTMENT (OUTPATIENT)
Age: 87
End: 2022-09-12

## 2022-09-12 ENCOUNTER — APPOINTMENT (OUTPATIENT)
Dept: CARDIOLOGY | Facility: CLINIC | Age: 87
End: 2022-09-12

## 2022-09-12 PROBLEM — I10 ESSENTIAL (PRIMARY) HYPERTENSION: Chronic | Status: ACTIVE | Noted: 2022-09-04

## 2022-09-12 PROBLEM — E78.5 HYPERLIPIDEMIA, UNSPECIFIED: Chronic | Status: ACTIVE | Noted: 2022-09-04

## 2022-09-12 PROBLEM — Z85.47 PERSONAL HISTORY OF MALIGNANT NEOPLASM OF TESTIS: Chronic | Status: ACTIVE | Noted: 2022-09-04

## 2022-09-12 PROBLEM — N18.3 CHRONIC KIDNEY DISEASE, STAGE 3 (MODERATE): Chronic | Status: ACTIVE | Noted: 2019-04-29

## 2022-09-12 PROBLEM — I50.9 HEART FAILURE, UNSPECIFIED: Chronic | Status: ACTIVE | Noted: 2019-04-29

## 2022-09-12 PROCEDURE — 93296 REM INTERROG EVL PM/IDS: CPT

## 2022-09-12 PROCEDURE — 93295 DEV INTERROG REMOTE 1/2/MLT: CPT

## 2022-09-21 ENCOUNTER — APPOINTMENT (OUTPATIENT)
Dept: SURGERY | Facility: CLINIC | Age: 87
End: 2022-09-21

## 2022-09-21 VITALS
HEIGHT: 70 IN | HEART RATE: 66 BPM | OXYGEN SATURATION: 97 % | DIASTOLIC BLOOD PRESSURE: 84 MMHG | WEIGHT: 158 LBS | TEMPERATURE: 97.1 F | SYSTOLIC BLOOD PRESSURE: 122 MMHG | BODY MASS INDEX: 22.62 KG/M2

## 2022-09-21 DIAGNOSIS — Z95.0 PRESENCE OF CARDIAC PACEMAKER: ICD-10-CM

## 2022-09-21 DIAGNOSIS — I42.0 DILATED CARDIOMYOPATHY: ICD-10-CM

## 2022-09-21 DIAGNOSIS — Z95.810 PRESENCE OF AUTOMATIC (IMPLANTABLE) CARDIAC DEFIBRILLATOR: ICD-10-CM

## 2022-09-21 DIAGNOSIS — E87.5 HYPERKALEMIA: ICD-10-CM

## 2022-09-21 DIAGNOSIS — K56.609 UNSPECIFIED INTESTINAL OBSTRUCTION, UNSPECIFIED AS TO PARTIAL VERSUS COMPLETE OBSTRUCTION: ICD-10-CM

## 2022-09-21 DIAGNOSIS — Z90.79 ACQUIRED ABSENCE OF OTHER GENITAL ORGAN(S): ICD-10-CM

## 2022-09-21 DIAGNOSIS — R18.8 OTHER ASCITES: ICD-10-CM

## 2022-09-21 DIAGNOSIS — E87.2 ACIDOSIS: ICD-10-CM

## 2022-09-21 DIAGNOSIS — I50.22 CHRONIC SYSTOLIC (CONGESTIVE) HEART FAILURE: ICD-10-CM

## 2022-09-21 DIAGNOSIS — E78.5 HYPERLIPIDEMIA, UNSPECIFIED: ICD-10-CM

## 2022-09-21 DIAGNOSIS — N18.31 CHRONIC KIDNEY DISEASE, STAGE 3A: ICD-10-CM

## 2022-09-21 DIAGNOSIS — K56.52 INTESTINAL ADHESIONS [BANDS] WITH COMPLETE OBSTRUCTION: ICD-10-CM

## 2022-09-21 DIAGNOSIS — N17.9 ACUTE KIDNEY FAILURE, UNSPECIFIED: ICD-10-CM

## 2022-09-21 DIAGNOSIS — I13.0 HYPERTENSIVE HEART AND CHRONIC KIDNEY DISEASE WITH HEART FAILURE AND STAGE 1 THROUGH STAGE 4 CHRONIC KIDNEY DISEASE, OR UNSPECIFIED CHRONIC KIDNEY DISEASE: ICD-10-CM

## 2022-09-21 DIAGNOSIS — Z85.47 PERSONAL HISTORY OF MALIGNANT NEOPLASM OF TESTIS: ICD-10-CM

## 2022-09-21 PROCEDURE — 99024 POSTOP FOLLOW-UP VISIT: CPT

## 2022-09-21 NOTE — HISTORY OF PRESENT ILLNESS
[FreeTextEntry1] : 87M with PMH of HFrEF, AICD, PPM, HTN, CKDIII, testicular ca s/p excision in 1988 who presents for post operative visit s/p laparoscopic lysis of adhesions for SBO on 9/7/22. He reports at this time he is feeling overall better.  He is tolerating a diet and having small hard BM daily.  Pt reports return of a different suprapubic discomfort not pain that he had in April. PT reports he has return of full appetite but if experiencing constipation and some weight loss. Pt denies fever, chills, nausea, vomiting, abdominal pain, diarrhea, blood in stool.\par

## 2022-09-21 NOTE — PHYSICAL EXAM
[Abdomen Masses] : No abdominal masses [Abdomen Tenderness] : ~T No ~M abdominal tenderness [No HSM] : no hepatosplenomegaly [Respiratory Effort] : normal respiratory effort [Normal Rate and Rhythm] : normal rate and rhythm [de-identified] : soft, non distended, non tender.  Well healed left flank incision. Healing port sites with dermabond in place. No erythema induration or purulence [de-identified] : awake, alert and in no acute distress

## 2022-09-21 NOTE — ASSESSMENT
[FreeTextEntry1] : 87M s/p laparoscopic lysis of adhesions for SBO\par \par Patient is recovering well.  I recommended a return to a regular diet and daily Miralax.  We will see him back in 3 months.

## 2022-10-12 ENCOUNTER — APPOINTMENT (OUTPATIENT)
Dept: UROLOGY | Facility: CLINIC | Age: 87
End: 2022-10-12

## 2022-10-12 VITALS
HEART RATE: 76 BPM | WEIGHT: 158 LBS | BODY MASS INDEX: 22.62 KG/M2 | SYSTOLIC BLOOD PRESSURE: 131 MMHG | HEIGHT: 70 IN | DIASTOLIC BLOOD PRESSURE: 80 MMHG | RESPIRATION RATE: 14 BRPM

## 2022-10-12 PROCEDURE — 99214 OFFICE O/P EST MOD 30 MIN: CPT

## 2022-10-12 NOTE — ASSESSMENT
[FreeTextEntry1] : Patient is an 87-year-old male follow-up for low testosterone.  History of testicular cancer status post orchiectomy in 1988 and retractile atrophic right testicle.  Patient has been on testosterone replacement therapy for low T for many years managed on AndroGel 2 pumps daily.\par \par Patient is status post emergent hospitalization and status post laparoscopic lysis of adhesions for small bowel obstruction on September 7, 2022.  Patient did have Boss catheter inserted.  1.3 cm bladder stone was noted on CT scan.\par Patient states he is currently feeling well.  He confirms that he is having bowel movements.  He states that Boss catheter has been removed and he is voiding well on his own.  Bladder scan PVR today is 19 mL.  He denies any dysuria and gross hematuria.  He denies any abdominal pain and denies any fevers.\par \par He presents to office today to discuss Surgery for bladder stone and prostate. \par \par His most recent lab work from October 2022 revealed total testosterone of 630 with a bioavailable of 184.6.  Sex hormone binding globulin of 32.  Hepatic function panel within normal limit.  And a CBC revealing a hemoglobin of 15.9 and hematocrit of 47.1.\par \par Prior lab work:\par 2022.\par Hepatic function panel reveals a bilirubin direct 0.3. Hemoglobin and hematocrit are within normal limit. Platelet count 129.\par Total testosterone 544\par PSA of 6.7 with percent free 27%.\par \par PREVIOUS VISITS:\par Labs 11/2021\par Total T- 338\par GFR- 31\par Creatinine- 1.95\par AST- 22\par ALT- 20\par Hemoglobin- 16\par \par Labs 05/2021\par Total T- 431\par Creatine- 1.96\par GFR- 31\par AST- 16\par ALT- 16 \par HGB- 16.2\par Hct- 49.3\par \par US Testicles 04/2021\par -Partially undescended right testicle, atrophic. No focal masses\par -S/p left orchiectomy \par \par Labs 1/7/2020\par -Total testosterone 475 (250-1100), bioavailable 143.5 (), SHBG 29 (22-77), Albumin 4.4 (3.6-5.1)\par \par June 2019\par US Doppler Scrotum\par right testis in inguinal canal -- no mass seen.\par Total T = 297, bioavail = 91\par \par US Duplex Scrotal - not done\par \par July 2020\par Bioavailable Testostertone Total; 157\par Complete Blood Count - hct 45\par Testosterone Free and Total; 503\par \par July 2020\par CT -- images visualized by me == IMPRESSION: \par 1. Numerous surgical clips surrounding the left kidney possibly accounting for the abnormal sonogram. Simple left renal cyst with no stone.\par 2. Significant prostate enlargement with bladder stone and bladder wall thickening consistent with outlet obstruction.\par 3. Status post lymphadenectomy with numerous retroperitoneal clips. No evidence of metastatic disease

## 2022-10-26 ENCOUNTER — OUTPATIENT (OUTPATIENT)
Dept: OUTPATIENT SERVICES | Facility: HOSPITAL | Age: 87
LOS: 1 days | Discharge: HOME | End: 2022-10-26

## 2022-10-26 VITALS
WEIGHT: 160.28 LBS | HEART RATE: 85 BPM | SYSTOLIC BLOOD PRESSURE: 136 MMHG | DIASTOLIC BLOOD PRESSURE: 64 MMHG | TEMPERATURE: 98 F | RESPIRATION RATE: 15 BRPM | OXYGEN SATURATION: 99 % | HEIGHT: 70 IN

## 2022-10-26 DIAGNOSIS — Z01.818 ENCOUNTER FOR OTHER PREPROCEDURAL EXAMINATION: ICD-10-CM

## 2022-10-26 DIAGNOSIS — N21.0 CALCULUS IN BLADDER: ICD-10-CM

## 2022-10-26 DIAGNOSIS — N40.1 BENIGN PROSTATIC HYPERPLASIA WITH LOWER URINARY TRACT SYMPTOMS: ICD-10-CM

## 2022-10-26 DIAGNOSIS — Z98.890 OTHER SPECIFIED POSTPROCEDURAL STATES: Chronic | ICD-10-CM

## 2022-10-26 LAB
ALBUMIN SERPL ELPH-MCNC: 5 G/DL — SIGNIFICANT CHANGE UP (ref 3.5–5.2)
ALP SERPL-CCNC: 73 U/L — SIGNIFICANT CHANGE UP (ref 30–115)
ALT FLD-CCNC: 15 U/L — SIGNIFICANT CHANGE UP (ref 0–41)
ANION GAP SERPL CALC-SCNC: 10 MMOL/L — SIGNIFICANT CHANGE UP (ref 7–14)
APPEARANCE UR: CLEAR — SIGNIFICANT CHANGE UP
APTT BLD: 32.3 SEC — SIGNIFICANT CHANGE UP (ref 27–39.2)
AST SERPL-CCNC: 18 U/L — SIGNIFICANT CHANGE UP (ref 0–41)
BASOPHILS # BLD AUTO: 0.04 K/UL — SIGNIFICANT CHANGE UP (ref 0–0.2)
BASOPHILS NFR BLD AUTO: 0.5 % — SIGNIFICANT CHANGE UP (ref 0–1)
BILIRUB SERPL-MCNC: 0.7 MG/DL — SIGNIFICANT CHANGE UP (ref 0.2–1.2)
BILIRUB UR-MCNC: NEGATIVE — SIGNIFICANT CHANGE UP
BUN SERPL-MCNC: 39 MG/DL — HIGH (ref 10–20)
CALCIUM SERPL-MCNC: 10 MG/DL — SIGNIFICANT CHANGE UP (ref 8.4–10.5)
CHLORIDE SERPL-SCNC: 105 MMOL/L — SIGNIFICANT CHANGE UP (ref 98–110)
CO2 SERPL-SCNC: 29 MMOL/L — SIGNIFICANT CHANGE UP (ref 17–32)
COLOR SPEC: YELLOW — SIGNIFICANT CHANGE UP
CREAT SERPL-MCNC: 1.9 MG/DL — HIGH (ref 0.7–1.5)
DIFF PNL FLD: NEGATIVE — SIGNIFICANT CHANGE UP
EGFR: 34 ML/MIN/1.73M2 — LOW
EOSINOPHIL # BLD AUTO: 0.12 K/UL — SIGNIFICANT CHANGE UP (ref 0–0.7)
EOSINOPHIL NFR BLD AUTO: 1.6 % — SIGNIFICANT CHANGE UP (ref 0–8)
GLUCOSE SERPL-MCNC: 92 MG/DL — SIGNIFICANT CHANGE UP (ref 70–99)
GLUCOSE UR QL: NEGATIVE — SIGNIFICANT CHANGE UP
HCT VFR BLD CALC: 47.7 % — SIGNIFICANT CHANGE UP (ref 42–52)
HGB BLD-MCNC: 15.6 G/DL — SIGNIFICANT CHANGE UP (ref 14–18)
IMM GRANULOCYTES NFR BLD AUTO: 0.3 % — SIGNIFICANT CHANGE UP (ref 0.1–0.3)
INR BLD: 0.93 RATIO — SIGNIFICANT CHANGE UP (ref 0.65–1.3)
KETONES UR-MCNC: NEGATIVE — SIGNIFICANT CHANGE UP
LEUKOCYTE ESTERASE UR-ACNC: NEGATIVE — SIGNIFICANT CHANGE UP
LYMPHOCYTES # BLD AUTO: 1.12 K/UL — LOW (ref 1.2–3.4)
LYMPHOCYTES # BLD AUTO: 15.2 % — LOW (ref 20.5–51.1)
MCHC RBC-ENTMCNC: 32.5 PG — HIGH (ref 27–31)
MCHC RBC-ENTMCNC: 32.7 G/DL — SIGNIFICANT CHANGE UP (ref 32–37)
MCV RBC AUTO: 99.4 FL — HIGH (ref 80–94)
MONOCYTES # BLD AUTO: 0.72 K/UL — HIGH (ref 0.1–0.6)
MONOCYTES NFR BLD AUTO: 9.8 % — HIGH (ref 1.7–9.3)
NEUTROPHILS # BLD AUTO: 5.33 K/UL — SIGNIFICANT CHANGE UP (ref 1.4–6.5)
NEUTROPHILS NFR BLD AUTO: 72.6 % — SIGNIFICANT CHANGE UP (ref 42.2–75.2)
NITRITE UR-MCNC: NEGATIVE — SIGNIFICANT CHANGE UP
NRBC # BLD: 0 /100 WBCS — SIGNIFICANT CHANGE UP (ref 0–0)
PH UR: 6 — SIGNIFICANT CHANGE UP (ref 5–8)
PLATELET # BLD AUTO: 138 K/UL — SIGNIFICANT CHANGE UP (ref 130–400)
POTASSIUM SERPL-MCNC: 4.3 MMOL/L — SIGNIFICANT CHANGE UP (ref 3.5–5)
POTASSIUM SERPL-SCNC: 4.3 MMOL/L — SIGNIFICANT CHANGE UP (ref 3.5–5)
PROT SERPL-MCNC: 7.6 G/DL — SIGNIFICANT CHANGE UP (ref 6–8)
PROT UR-MCNC: SIGNIFICANT CHANGE UP
PROTHROM AB SERPL-ACNC: 10.6 SEC — SIGNIFICANT CHANGE UP (ref 9.95–12.87)
RBC # BLD: 4.8 M/UL — SIGNIFICANT CHANGE UP (ref 4.7–6.1)
RBC # FLD: 13.2 % — SIGNIFICANT CHANGE UP (ref 11.5–14.5)
SODIUM SERPL-SCNC: 144 MMOL/L — SIGNIFICANT CHANGE UP (ref 135–146)
SP GR SPEC: 1.02 — SIGNIFICANT CHANGE UP (ref 1.01–1.03)
UROBILINOGEN FLD QL: SIGNIFICANT CHANGE UP
WBC # BLD: 7.35 K/UL — SIGNIFICANT CHANGE UP (ref 4.8–10.8)
WBC # FLD AUTO: 7.35 K/UL — SIGNIFICANT CHANGE UP (ref 4.8–10.8)

## 2022-10-26 PROCEDURE — 93010 ELECTROCARDIOGRAM REPORT: CPT

## 2022-10-26 NOTE — H&P PST ADULT - REASON FOR ADMISSION
Proceduralist: Matthew Garces  Procedure: CYSTOSCOPY TRANSURETHRAL RESECTION OF PROSTATE,(BIPOLAR), CYSTOLITHOLAPAXY   Procedure: 120 Minutes  PT REPORTS--I HAVE BLADDER STONES.   I HAVE HAD AN PAIN/ACHE  IN MY LOWER ABDOMIN SINCE 5/2022.  THE PAIN IS 4/10.  IT IS A PRESSURE AND ACHING PAIN.  NOTHING TAKES THE ACHE/PAIN AWAY.  SOMETIMES REST HELPS A LITTLE.

## 2022-10-26 NOTE — H&P PST ADULT - NSICDXPASTMEDICALHX_GEN_ALL_CORE_FT
PAST MEDICAL HISTORY:  Chronic CHF (congestive heart failure) (EF=15)    CKD (chronic kidney disease) stage 3, GFR 30-59 ml/min (baseline Creat=1.8)    Dyslipidemia     Dyslipidemia     H/O testicular cancer 1988    History of pacemaker     HTN (hypertension)     S/P implantation of automatic cardioverter/defibrillator (AICD)

## 2022-10-26 NOTE — H&P PST ADULT - HISTORY OF PRESENT ILLNESS
PT PRESENTS TO PAST WITH NO SOB, CP, PALPITATIONS, DYSURIA, UTI OR URI AT PRESENT.   PT ABLE TO WALK UP 2-3 FLIGHTS OF STEPS WITH NO SOB.  AS PER THE PT, THIS IS HIS/HER COMPLETE MEDICAL AND SURGICAL HX, INCLUDING MEDICATIONS PRESCRIBED AND OVER THE COUNTER  pt denies any covid s/s, or tested positive in the past-- PT AWARE OF DATE AND TIME OF COVID TESTING PRIOR TO DOP  pt advised self quarantine till day of procedure  denies travel outside the [place] in the past 30 days  Anesthesia Alert  NO--Difficult Airway  NO--History of neck surgery   YES  radiation-TESTICULAR 1988  NO--Limited ROM of neck  NO--History of Malignant hyperthermia  NO--Personal or family history of Pseudocholinesterase deficiency  NO--Prior Anesthesia Complication  NO--Latex Allergy  NO--Loose teeth  NO--History of Rheumatoid Arthritis  NO--PARTH  NO BLEEDING RISK  NO--Other_____

## 2022-10-28 LAB
CULTURE RESULTS: SIGNIFICANT CHANGE UP
SPECIMEN SOURCE: SIGNIFICANT CHANGE UP

## 2022-11-03 ENCOUNTER — APPOINTMENT (OUTPATIENT)
Dept: CARDIOLOGY | Facility: CLINIC | Age: 87
End: 2022-11-03
Payer: COMMERCIAL

## 2022-11-03 VITALS
BODY MASS INDEX: 22.9 KG/M2 | SYSTOLIC BLOOD PRESSURE: 117 MMHG | HEART RATE: 76 BPM | HEIGHT: 70 IN | DIASTOLIC BLOOD PRESSURE: 75 MMHG | WEIGHT: 160 LBS | TEMPERATURE: 97.7 F

## 2022-11-03 PROCEDURE — 93284 PRGRMG EVAL IMPLANTABLE DFB: CPT

## 2022-11-03 PROCEDURE — 93290 INTERROG DEV EVAL ICPMS IP: CPT

## 2022-11-03 PROCEDURE — 99213 OFFICE O/P EST LOW 20 MIN: CPT | Mod: 25

## 2022-11-03 RX ORDER — SIMVASTATIN 20 MG/1
20 TABLET, FILM COATED ORAL
Qty: 90 | Refills: 0 | Status: COMPLETED | COMMUNITY
Start: 2018-02-21 | End: 2022-11-03

## 2022-11-03 RX ORDER — SPIRONOLACTONE 25 MG/1
25 TABLET ORAL
Qty: 90 | Refills: 0 | Status: COMPLETED | COMMUNITY
Start: 2017-02-24 | End: 2022-11-03

## 2022-11-03 RX ORDER — LOSARTAN POTASSIUM 100 MG/1
100 TABLET, FILM COATED ORAL
Qty: 90 | Refills: 0 | Status: COMPLETED | COMMUNITY
Start: 2018-02-21 | End: 2022-11-03

## 2022-11-12 ENCOUNTER — LABORATORY RESULT (OUTPATIENT)
Age: 87
End: 2022-11-12

## 2022-11-14 NOTE — ASU PATIENT PROFILE, ADULT - FALL HARM RISK - UNIVERSAL INTERVENTIONS
Bed in lowest position, wheels locked, appropriate side rails in place/Call bell, personal items and telephone in reach/Instruct patient to call for assistance before getting out of bed or chair/Non-slip footwear when patient is out of bed/[place] to call system/Physically safe environment - no spills, clutter or unnecessary equipment/Purposeful Proactive Rounding/Room/bathroom lighting operational, light cord in reach

## 2022-11-14 NOTE — ASU PATIENT PROFILE, ADULT - NS PREOP MEDICATION GIVEN
Patient directed to follow instructions provided by MD/in pre-surgical testing regarding medications

## 2022-11-15 ENCOUNTER — APPOINTMENT (OUTPATIENT)
Dept: UROLOGY | Facility: HOSPITAL | Age: 87
End: 2022-11-15

## 2022-11-15 ENCOUNTER — RESULT REVIEW (OUTPATIENT)
Age: 87
End: 2022-11-15

## 2022-11-15 ENCOUNTER — TRANSCRIPTION ENCOUNTER (OUTPATIENT)
Age: 87
End: 2022-11-15

## 2022-11-15 ENCOUNTER — OUTPATIENT (OUTPATIENT)
Dept: OUTPATIENT SERVICES | Facility: HOSPITAL | Age: 87
LOS: 1 days | Discharge: HOME | End: 2022-11-15

## 2022-11-15 VITALS
WEIGHT: 162.04 LBS | HEIGHT: 70 IN | RESPIRATION RATE: 18 BRPM | SYSTOLIC BLOOD PRESSURE: 145 MMHG | HEART RATE: 64 BPM | DIASTOLIC BLOOD PRESSURE: 71 MMHG | TEMPERATURE: 98 F | OXYGEN SATURATION: 98 %

## 2022-11-15 VITALS
RESPIRATION RATE: 20 BRPM | HEART RATE: 76 BPM | OXYGEN SATURATION: 99 % | DIASTOLIC BLOOD PRESSURE: 70 MMHG | SYSTOLIC BLOOD PRESSURE: 134 MMHG

## 2022-11-15 DIAGNOSIS — Z98.890 OTHER SPECIFIED POSTPROCEDURAL STATES: Chronic | ICD-10-CM

## 2022-11-15 PROCEDURE — 52317 REMOVE BLADDER STONE: CPT

## 2022-11-15 PROCEDURE — 88300 SURGICAL PATH GROSS: CPT | Mod: 26

## 2022-11-15 RX ORDER — LOSARTAN POTASSIUM 100 MG/1
1 TABLET, FILM COATED ORAL
Qty: 0 | Refills: 0 | DISCHARGE

## 2022-11-15 RX ORDER — ONDANSETRON 8 MG/1
4 TABLET, FILM COATED ORAL ONCE
Refills: 0 | Status: DISCONTINUED | OUTPATIENT
Start: 2022-11-15 | End: 2022-11-29

## 2022-11-15 RX ORDER — FUROSEMIDE 40 MG
1 TABLET ORAL
Qty: 0 | Refills: 0 | DISCHARGE

## 2022-11-15 RX ORDER — OXYCODONE AND ACETAMINOPHEN 5; 325 MG/1; MG/1
1 TABLET ORAL EVERY 4 HOURS
Refills: 0 | Status: DISCONTINUED | OUTPATIENT
Start: 2022-11-15 | End: 2022-11-15

## 2022-11-15 RX ORDER — SPIRONOLACTONE 25 MG/1
1 TABLET, FILM COATED ORAL
Qty: 0 | Refills: 0 | DISCHARGE

## 2022-11-15 RX ORDER — ACETAMINOPHEN 500 MG
3 TABLET ORAL
Qty: 60 | Refills: 0
Start: 2022-11-15 | End: 2022-11-19

## 2022-11-15 RX ORDER — DOCUSATE SODIUM 100 MG
1 CAPSULE ORAL
Qty: 90 | Refills: 0
Start: 2022-11-15 | End: 2022-12-14

## 2022-11-15 RX ORDER — SODIUM CHLORIDE 9 MG/ML
1000 INJECTION, SOLUTION INTRAVENOUS
Refills: 0 | Status: DISCONTINUED | OUTPATIENT
Start: 2022-11-15 | End: 2022-11-29

## 2022-11-15 RX ORDER — MORPHINE SULFATE 50 MG/1
2 CAPSULE, EXTENDED RELEASE ORAL
Refills: 0 | Status: DISCONTINUED | OUTPATIENT
Start: 2022-11-15 | End: 2022-11-15

## 2022-11-15 RX ORDER — PHENAZOPYRIDINE HCL 100 MG
1 TABLET ORAL
Qty: 4 | Refills: 0
Start: 2022-11-15 | End: 2022-11-16

## 2022-11-15 NOTE — CHART NOTE - NSCHARTNOTEFT_GEN_A_CORE
PACU ANESTHESIA ADMISSION NOTE      Procedure: Holmium laser cystolithotripsy      Post op diagnosis:  Bladder stone        ____  Intubated  TV:______       Rate: ______      FiO2: ______    _x___  Patent Airway    _x___  Full return of protective reflexes    _x___  Full recovery from anesthesia / back to baseline status    Vitals:    see anesthesia record    Mental Status:  _x___ Awake   _____ Alert   _____ Drowsy   _____ Sedated    Nausea/Vomiting:  _x___  NO       ______Yes,   See Post - Op Orders         Pain Scale (0-10):  _____    Treatment: ____ None    __x__ See Post - Op/PCA Orders    Post - Operative Fluids:   ____ Oral   ___x_ See Post - Op Orders    Plan: Discharge:   _x___Home       _____Floor     _____Critical Care    _____  Other:_________________    Comments:  No anesthesia issues or complications noted.  Discharge when criteria met.

## 2022-11-15 NOTE — ASU DISCHARGE PLAN (ADULT/PEDIATRIC) - CARE PROVIDER_API CALL
Matthew Garces)  Urology  [address] [place] [address], [address]  [place] [address]  Phone: [phone]  Fax: [phone]  Follow Up Time:

## 2022-11-15 NOTE — PRE-ANESTHESIA EVALUATION ADULT - NSANTHDIETYNSD_GEN_ALL_CORE
Yes
[FreeTextEntry1] : - TVUS done today. very early IUP measuring approximately 5-6 weeks. +small YS; no GS, or FHR  noted. \par RTO in 2 weeks for confirmation of pregnancy + dating. \par - Pelvic exam done, wnl. \par - GC swab + Beta HCG + NOB labs + urine culture+ bd affirm + carrier screening done today; pt requests labs to be done @ [facility].\par - BD affirm done due to c/o brown discharge \par - Pregnancy booklet given Discussed the recommendations and course of a routine pregnancy including weight gain, dietary issues, vitamins, exercise guidelines and issues regarding the use of medications and herbs.  Reviewed the recommended obstetrical evaluation for fetal well-being in pregnancy, including prenatal blood work, appropriate genetic carrier screening, Level II ultrasound, and third trimester testing. All questions answered and patient informed to call with any questions or unusual symptoms. \par - Advised to continue taking PNV daily\par - Advised to start taking 2 baby aspirins at 12 weeks, daily. Discussed AMA and risks of preeclampsia. \par - RTO in 2 weeks for follow up amenorrhea/confirm dating.

## 2022-11-15 NOTE — PACU DISCHARGE NOTE - MENTAL STATUS: PATIENT PARTICIPATION
PT CALLED TO SAY MISSED APPT 2/10/2021 AND WILL CALL BACK TO SCHEDULE MD VISIT WITH LABS A FEW DAYS PRIOR. PT WAS HOSPITALIZED AT TIME OF PREVIOUS SCHEDULED VISIT. Awake

## 2022-11-15 NOTE — ASU DISCHARGE PLAN (ADULT/PEDIATRIC) - NS MD DC FALL RISK RISK
For information on Fall & Injury Prevention, visit: [email or web address] OR  [email or web address] OR  https://www.cdc.gov/steadi/patient.html

## 2022-11-16 ENCOUNTER — APPOINTMENT (OUTPATIENT)
Dept: UROLOGY | Facility: CLINIC | Age: 87
End: 2022-11-16

## 2022-11-16 VITALS
HEART RATE: 70 BPM | RESPIRATION RATE: 16 BRPM | WEIGHT: 160 LBS | SYSTOLIC BLOOD PRESSURE: 135 MMHG | DIASTOLIC BLOOD PRESSURE: 72 MMHG | OXYGEN SATURATION: 98 % | TEMPERATURE: 97.5 F | HEIGHT: 70 IN | BODY MASS INDEX: 22.9 KG/M2

## 2022-11-16 LAB — SURGICAL PATHOLOGY STUDY: SIGNIFICANT CHANGE UP

## 2022-11-16 PROCEDURE — 99212 OFFICE O/P EST SF 10 MIN: CPT | Mod: 24

## 2022-11-16 NOTE — ASSESSMENT
[FreeTextEntry1] : 87-year-old male status post cystolitholopaxy yesterday.  He was scheduled for TURP, however patient took aspirin day of surgery and TURP was canceled.  He is currently being scheduled to have TURP in a few weeks.  He presents to office today for Boss catheter removal.  He states he feels well.  Clear orange-colored urine in catheter bag.\par \par Boss catheter removed today without difficulty.  Patient tolerated well.\par Return to office in emergency precautions reviewed with patient.  Patient verbalized understanding.\par \par Patient to follow-up for TURP.

## 2022-11-16 NOTE — HISTORY OF PRESENT ILLNESS
[FreeTextEntry1] : 87-year-old male status post cystolitholopaxy yesterday.  He was scheduled for TURP, however patient took aspirin day of surgery and TURP was canceled.  He is currently being scheduled to have TURP in a few weeks.  He presents to office today for Boss catheter removal.  He states he feels well.  Clear orange-colored urine in catheter bag.

## 2022-11-16 NOTE — PHYSICAL EXAM
[General Appearance - In No Acute Distress] : no acute distress [] : no respiratory distress [Oriented To Time, Place, And Person] : oriented to person, place, and time [Normal Station and Gait] : the gait and station were normal for the patient's age [No Focal Deficits] : no focal deficits [FreeTextEntry1] : Clear orange-colored urine in the catheter bag.

## 2022-11-16 NOTE — ASSESSMENT
[FreeTextEntry1] : Heart failure\par -Normal Functioning BiV ICD.\par -Continue losartan 20 mg QD.\par -Continue Metoprolol 50 mg BID. \par -Continue Spironolactone 25 mg QD.\par \par I have spent 25 minutes of time on the encounter excluding separately reported services.\par \par \par I, Milton Almodovar, personally performed the services described in this documentation. All medical record entries made by the scribe/nurse CTA were at my direction and in my presence. I have reviewed the chart and agree that the record reflects my personal performance and is accurate and complete.\par

## 2022-11-16 NOTE — HISTORY OF PRESENT ILLNESS
[de-identified] : 84 yo M pmh of HTN, HLD, HFrEF with AICD.\par \par Patient without complaints, comes for a routine follow-up of his BiV ICD.

## 2022-11-16 NOTE — ADDENDUM
[FreeTextEntry1] : IMartin assisted in documentation on 11/07/2022  acting as a scribe for Milton Tate.\par

## 2022-11-16 NOTE — PROCEDURE
[See Scanned Paceart Report] : See scanned paceart report [See Device Printout] : See device printout [CRT-D] : Cardiac resynchronization therapy defibrillator [de-identified] : [employer] [de-identified] : No significant evetns

## 2022-11-18 DIAGNOSIS — Z82.49 FAMILY HISTORY OF ISCHEMIC HEART DISEASE AND OTHER DISEASES OF THE CIRCULATORY SYSTEM: ICD-10-CM

## 2022-11-18 DIAGNOSIS — N13.8 OTHER OBSTRUCTIVE AND REFLUX UROPATHY: ICD-10-CM

## 2022-11-18 DIAGNOSIS — Z95.0 PRESENCE OF CARDIAC PACEMAKER: ICD-10-CM

## 2022-11-18 DIAGNOSIS — I50.9 HEART FAILURE, UNSPECIFIED: ICD-10-CM

## 2022-11-18 DIAGNOSIS — N18.9 CHRONIC KIDNEY DISEASE, UNSPECIFIED: ICD-10-CM

## 2022-11-18 DIAGNOSIS — E78.5 HYPERLIPIDEMIA, UNSPECIFIED: ICD-10-CM

## 2022-11-18 DIAGNOSIS — N40.1 BENIGN PROSTATIC HYPERPLASIA WITH LOWER URINARY TRACT SYMPTOMS: ICD-10-CM

## 2022-11-18 DIAGNOSIS — Z95.810 PRESENCE OF AUTOMATIC (IMPLANTABLE) CARDIAC DEFIBRILLATOR: ICD-10-CM

## 2022-11-18 DIAGNOSIS — Z79.899 OTHER LONG TERM (CURRENT) DRUG THERAPY: ICD-10-CM

## 2022-11-18 DIAGNOSIS — N21.0 CALCULUS IN BLADDER: ICD-10-CM

## 2022-11-18 DIAGNOSIS — I13.0 HYPERTENSIVE HEART AND CHRONIC KIDNEY DISEASE WITH HEART FAILURE AND STAGE 1 THROUGH STAGE 4 CHRONIC KIDNEY DISEASE, OR UNSPECIFIED CHRONIC KIDNEY DISEASE: ICD-10-CM

## 2022-11-19 LAB — NIDUS STONE QN: SIGNIFICANT CHANGE UP

## 2022-11-25 NOTE — DISCHARGE NOTE NURSING/CASE MANAGEMENT/SOCIAL WORK - NSDPACMPNY_GEN_ALL_CORE
Family
PAST SURGICAL HISTORY:  H/O breast surgery Left    Port-A-Cath in place     S/P rotator cuff repair Right    Status post eye surgery B/L catract surgery

## 2022-12-06 ENCOUNTER — OUTPATIENT (OUTPATIENT)
Dept: OUTPATIENT SERVICES | Facility: HOSPITAL | Age: 87
LOS: 1 days | Discharge: HOME | End: 2022-12-06

## 2022-12-06 DIAGNOSIS — Z95.0 PRESENCE OF CARDIAC PACEMAKER: Chronic | ICD-10-CM

## 2022-12-06 DIAGNOSIS — Z01.818 ENCOUNTER FOR OTHER PREPROCEDURAL EXAMINATION: ICD-10-CM

## 2022-12-06 DIAGNOSIS — N40.1 BENIGN PROSTATIC HYPERPLASIA WITH LOWER URINARY TRACT SYMPTOMS: ICD-10-CM

## 2022-12-06 DIAGNOSIS — Z98.890 OTHER SPECIFIED POSTPROCEDURAL STATES: Chronic | ICD-10-CM

## 2022-12-06 PROCEDURE — 93010 ELECTROCARDIOGRAM REPORT: CPT

## 2022-12-06 RX ORDER — METOPROLOL TARTRATE 50 MG
1 TABLET ORAL
Qty: 0 | Refills: 0 | DISCHARGE

## 2022-12-06 RX ORDER — SPIRONOLACTONE 25 MG/1
1 TABLET, FILM COATED ORAL
Qty: 0 | Refills: 0 | DISCHARGE

## 2022-12-07 ENCOUNTER — RESULT REVIEW (OUTPATIENT)
Age: 87
End: 2022-12-07

## 2022-12-07 ENCOUNTER — APPOINTMENT (OUTPATIENT)
Dept: SURGERY | Facility: CLINIC | Age: 87
End: 2022-12-07

## 2022-12-07 ENCOUNTER — OUTPATIENT (OUTPATIENT)
Dept: OUTPATIENT SERVICES | Facility: HOSPITAL | Age: 87
LOS: 1 days | Discharge: HOME | End: 2022-12-07

## 2022-12-07 DIAGNOSIS — Z98.890 OTHER SPECIFIED POSTPROCEDURAL STATES: Chronic | ICD-10-CM

## 2022-12-07 DIAGNOSIS — Z95.0 PRESENCE OF CARDIAC PACEMAKER: Chronic | ICD-10-CM

## 2022-12-07 DIAGNOSIS — Z02.9 ENCOUNTER FOR ADMINISTRATIVE EXAMINATIONS, UNSPECIFIED: ICD-10-CM

## 2022-12-07 PROCEDURE — 71046 X-RAY EXAM CHEST 2 VIEWS: CPT | Mod: 26

## 2022-12-10 ENCOUNTER — LABORATORY RESULT (OUTPATIENT)
Age: 87
End: 2022-12-10

## 2022-12-13 ENCOUNTER — APPOINTMENT (OUTPATIENT)
Dept: UROLOGY | Facility: HOSPITAL | Age: 87
End: 2022-12-13

## 2022-12-13 ENCOUNTER — OUTPATIENT (OUTPATIENT)
Dept: OUTPATIENT SERVICES | Facility: HOSPITAL | Age: 87
LOS: 1 days | Discharge: HOME | End: 2022-12-13

## 2022-12-13 ENCOUNTER — TRANSCRIPTION ENCOUNTER (OUTPATIENT)
Age: 87
End: 2022-12-13

## 2022-12-13 ENCOUNTER — RESULT REVIEW (OUTPATIENT)
Age: 87
End: 2022-12-13

## 2022-12-13 VITALS
HEIGHT: 70 IN | TEMPERATURE: 97 F | SYSTOLIC BLOOD PRESSURE: 141 MMHG | OXYGEN SATURATION: 100 % | HEART RATE: 65 BPM | RESPIRATION RATE: 17 BRPM | WEIGHT: 158.95 LBS | DIASTOLIC BLOOD PRESSURE: 74 MMHG

## 2022-12-13 VITALS — SYSTOLIC BLOOD PRESSURE: 114 MMHG | DIASTOLIC BLOOD PRESSURE: 70 MMHG | RESPIRATION RATE: 18 BRPM | HEART RATE: 71 BPM

## 2022-12-13 DIAGNOSIS — Z98.890 OTHER SPECIFIED POSTPROCEDURAL STATES: Chronic | ICD-10-CM

## 2022-12-13 DIAGNOSIS — I50.9 HEART FAILURE, UNSPECIFIED: ICD-10-CM

## 2022-12-13 DIAGNOSIS — Z95.810 PRESENCE OF AUTOMATIC (IMPLANTABLE) CARDIAC DEFIBRILLATOR: Chronic | ICD-10-CM

## 2022-12-13 DIAGNOSIS — Z85.47 PERSONAL HISTORY OF MALIGNANT NEOPLASM OF TESTIS: ICD-10-CM

## 2022-12-13 DIAGNOSIS — Z95.0 PRESENCE OF CARDIAC PACEMAKER: Chronic | ICD-10-CM

## 2022-12-13 DIAGNOSIS — Z95.810 PRESENCE OF AUTOMATIC (IMPLANTABLE) CARDIAC DEFIBRILLATOR: ICD-10-CM

## 2022-12-13 DIAGNOSIS — E78.5 HYPERLIPIDEMIA, UNSPECIFIED: ICD-10-CM

## 2022-12-13 PROCEDURE — 88305 TISSUE EXAM BY PATHOLOGIST: CPT | Mod: 26

## 2022-12-13 PROCEDURE — 52601 PROSTATECTOMY (TURP): CPT

## 2022-12-13 RX ORDER — HYDROMORPHONE HYDROCHLORIDE 2 MG/ML
0.5 INJECTION INTRAMUSCULAR; INTRAVENOUS; SUBCUTANEOUS
Refills: 0 | Status: DISCONTINUED | OUTPATIENT
Start: 2022-12-13 | End: 2022-12-13

## 2022-12-13 RX ORDER — SIMVASTATIN 20 MG/1
1 TABLET, FILM COATED ORAL
Qty: 0 | Refills: 0 | DISCHARGE

## 2022-12-13 RX ORDER — OXYCODONE HYDROCHLORIDE 5 MG/1
5 TABLET ORAL ONCE
Refills: 0 | Status: DISCONTINUED | OUTPATIENT
Start: 2022-12-13 | End: 2022-12-13

## 2022-12-13 RX ORDER — DOCUSATE SODIUM 100 MG
1 CAPSULE ORAL
Qty: 42 | Refills: 0
Start: 2022-12-13 | End: 2022-12-26

## 2022-12-13 RX ORDER — SODIUM CHLORIDE 9 MG/ML
1000 INJECTION, SOLUTION INTRAVENOUS
Refills: 0 | Status: DISCONTINUED | OUTPATIENT
Start: 2022-12-13 | End: 2022-12-13

## 2022-12-13 RX ORDER — ACETAMINOPHEN 500 MG
3 TABLET ORAL
Qty: 60 | Refills: 0
Start: 2022-12-13 | End: 2022-12-17

## 2022-12-13 RX ORDER — ONDANSETRON 8 MG/1
4 TABLET, FILM COATED ORAL ONCE
Refills: 0 | Status: DISCONTINUED | OUTPATIENT
Start: 2022-12-13 | End: 2022-12-13

## 2022-12-13 RX ORDER — PHENAZOPYRIDINE HCL 100 MG
1 TABLET ORAL
Qty: 4 | Refills: 0
Start: 2022-12-13 | End: 2022-12-14

## 2022-12-13 RX ORDER — LOSARTAN POTASSIUM 100 MG/1
1 TABLET, FILM COATED ORAL
Qty: 0 | Refills: 0 | DISCHARGE

## 2022-12-13 RX ADMIN — SODIUM CHLORIDE 75 MILLILITER(S): 9 INJECTION, SOLUTION INTRAVENOUS at 13:46

## 2022-12-13 NOTE — ASU PATIENT PROFILE, ADULT - NSICDXPASTMEDICALHX_GEN_ALL_CORE_FT
PAST MEDICAL HISTORY:  Chronic CHF (congestive heart failure) (EF=15)    CKD (chronic kidney disease) stage 3, GFR 30-59 ml/min (baseline Creat=1.8)    Dyslipidemia     Dyslipidemia     H/O testicular cancer 1988    History of pacemaker     HTN (hypertension)     S/P implantation of automatic cardioverter/defibrillator (AICD)      PAST MEDICAL HISTORY:  Chronic CHF (congestive heart failure) (EF=15)    CKD (chronic kidney disease) stage 3, GFR 30-59 ml/min (baseline Creat=1.8)    Dyslipidemia     Dyslipidemia     Former smoker     H/O testicular cancer 1988    HTN (hypertension)

## 2022-12-13 NOTE — CHART NOTE - NSCHARTNOTEFT_GEN_A_CORE
PACU ANESTHESIA ADMISSION NOTE      Procedure: Cystoscopy. TURP  Post op diagnosis: BPH    ____  Intubated  TV:______       Rate: ______      FiO2: ______    _x___  Patent Airway    __x__  Full return of protective reflexes    __x__  Full recovery from anesthesia / back to baseline     Vitals:   T:   98        R:  16                BP:  89/45                Sat:  97%                 P: 70      Mental Status:  __x__ Awake   _____ Alert   _____ Drowsy   _____ Sedated    Nausea/Vomiting:  __x__ NO  ______Yes,   See Post - Op Orders          Pain Scale (0-10):  _____    Treatment: ____ None    __x__ See Post - Op/PCA Orders    Post - Operative Fluids:   ____ Oral   _x___ See Post - Op Orders    Plan: Discharge:   _x___Home       _____Floor     _____Critical Care    _____  Other:_________________    Comments:

## 2022-12-13 NOTE — BRIEF OPERATIVE NOTE - NSICDXBRIEFPREOP_GEN_ALL_CORE_FT
PRE-OP DIAGNOSIS:  BPH with obstruction/lower urinary tract symptoms 13-Dec-2022 13:02:34  Matthew Garces

## 2022-12-13 NOTE — ASU DISCHARGE PLAN (ADULT/PEDIATRIC) - CARE PROVIDER_API CALL
Matthew Garces)  Urology  [address] [place] [address], [address]  [place] [address]  Phone: [phone]  Fax: [phone]  Follow Up Time:

## 2022-12-13 NOTE — PRE-ANESTHESIA EVALUATION ADULT - NSANTHAIRWAYFT_ENT_ALL_CORE
Received an additional call from patient's sisterDeanne regarding below.    Information for PT/OT referral:   [facility] [phone]    Patient's sister adamantly declined an appointment to address this and also declined triage. There is an upcoming appointment for patient but not until 10/5/22.    SHANTELL ValadezN RN  [facility]   wnl, mildly decreased ROM, tmd 3 fb

## 2022-12-13 NOTE — PRE-ANESTHESIA EVALUATION ADULT - NSANTHOSAYNRD_GEN_A_CORE
No. PARTH screening performed.  STOP BANG Legend: 0-2 = LOW Risk; 3-4 = INTERMEDIATE Risk; 5-8 = HIGH Risk
No. PARTH screening performed.  STOP BANG Legend: 0-2 = LOW Risk; 3-4 = INTERMEDIATE Risk; 5-8 = HIGH Risk

## 2022-12-13 NOTE — BRIEF OPERATIVE NOTE - OPERATION/FINDINGS
TURP today.  has bleeding from sinus on the patients left side.  fulgurated with button electrode.  22F coude moses left in place with moderate traction

## 2022-12-13 NOTE — ASU PATIENT PROFILE, ADULT - NSICDXPASTSURGICALHX_GEN_ALL_CORE_FT
PAST SURGICAL HISTORY:  H/O excision of testicular mass (Bilateral with LN excision in 1988)     PAST SURGICAL HISTORY:  Dual implantable cardioverter-defibrillator in situ     H/O excision of testicular mass (Bilateral with LN excision in 1988)    History of surgery gallstones removed    Pacemaker [employer]

## 2022-12-13 NOTE — ASU DISCHARGE PLAN (ADULT/PEDIATRIC) - NS MD DC FALL RISK RISK
For information on Fall & Injury Prevention, visit: [email or web address] OR  [email or web address] OR  https://www.cdc.gov/steadi/patient.html

## 2022-12-13 NOTE — BRIEF OPERATIVE NOTE - NSICDXBRIEFPOSTOP_GEN_ALL_CORE_FT
POST-OP DIAGNOSIS:  BPH with obstruction/lower urinary tract symptoms 13-Dec-2022 13:02:45  Matthew Garces

## 2022-12-14 PROBLEM — Z87.891 PERSONAL HISTORY OF NICOTINE DEPENDENCE: Chronic | Status: ACTIVE | Noted: 2022-12-06

## 2022-12-15 LAB — SURGICAL PATHOLOGY STUDY: SIGNIFICANT CHANGE UP

## 2022-12-16 ENCOUNTER — APPOINTMENT (OUTPATIENT)
Dept: UROLOGY | Facility: CLINIC | Age: 87
End: 2022-12-16

## 2022-12-16 VITALS
HEIGHT: 70 IN | BODY MASS INDEX: 22.9 KG/M2 | WEIGHT: 160 LBS | SYSTOLIC BLOOD PRESSURE: 140 MMHG | DIASTOLIC BLOOD PRESSURE: 80 MMHG

## 2022-12-16 DIAGNOSIS — Z87.891 PERSONAL HISTORY OF NICOTINE DEPENDENCE: ICD-10-CM

## 2022-12-16 DIAGNOSIS — E78.00 PURE HYPERCHOLESTEROLEMIA, UNSPECIFIED: ICD-10-CM

## 2022-12-16 DIAGNOSIS — I13.0 HYPERTENSIVE HEART AND CHRONIC KIDNEY DISEASE WITH HEART FAILURE AND STAGE 1 THROUGH STAGE 4 CHRONIC KIDNEY DISEASE, OR UNSPECIFIED CHRONIC KIDNEY DISEASE: ICD-10-CM

## 2022-12-16 DIAGNOSIS — N40.1 BENIGN PROSTATIC HYPERPLASIA WITH LOWER URINARY TRACT SYMPTOMS: ICD-10-CM

## 2022-12-16 DIAGNOSIS — N18.30 CHRONIC KIDNEY DISEASE, STAGE 3 UNSPECIFIED: ICD-10-CM

## 2022-12-16 DIAGNOSIS — N13.8 OTHER OBSTRUCTIVE AND REFLUX UROPATHY: ICD-10-CM

## 2022-12-16 PROCEDURE — 99024 POSTOP FOLLOW-UP VISIT: CPT

## 2022-12-16 NOTE — HISTORY OF PRESENT ILLNESS
[FreeTextEntry1] : TURP on Dec 13 2022\par Boss out today\par urine is yellow\par patient feels well\par no fevers\par \par ER if unable to urinate by end of day or has severe bleeding in urine\par \par plan for follow up end of March 2022

## 2023-02-02 ENCOUNTER — APPOINTMENT (OUTPATIENT)
Dept: CARDIOLOGY | Facility: CLINIC | Age: 88
End: 2023-02-02
Payer: COMMERCIAL

## 2023-02-02 ENCOUNTER — NON-APPOINTMENT (OUTPATIENT)
Age: 88
End: 2023-02-02

## 2023-02-02 PROCEDURE — 93296 REM INTERROG EVL PM/IDS: CPT

## 2023-02-02 PROCEDURE — 93295 DEV INTERROG REMOTE 1/2/MLT: CPT

## 2023-03-20 NOTE — PRE-ANESTHESIA EVALUATION ADULT - HEART RATE (BEATS/MIN)
Procedure(s):  L3-L5 LAMINECTOMY/FUSION/INSTRUMENTATION/TLIF/BONE GRAFT. general    Anesthesia Post Evaluation      Multimodal analgesia: multimodal analgesia not used between 6 hours prior to anesthesia start to PACU discharge  Patient location during evaluation: PACU  Patient participation: complete - patient participated  Level of consciousness: awake  Pain management: adequate  Airway patency: patent  Anesthetic complications: no  Cardiovascular status: acceptable, blood pressure returned to baseline and hemodynamically stable  Respiratory status: acceptable  Hydration status: acceptable  Post anesthesia nausea and vomiting:  controlled  Final Post Anesthesia Temperature Assessment:  Normothermia (36.0-37.5 degrees C)      INITIAL Post-op Vital signs:   Vitals Value Taken Time   /70 03/20/23 1750   Temp 36.6 °C (97.8 °F) 03/20/23 1721   Pulse 86 03/20/23 1752   Resp 16 03/20/23 1752   SpO2 93 % 03/20/23 1752   Vitals shown include unvalidated device data.
105

## 2023-04-12 ENCOUNTER — APPOINTMENT (OUTPATIENT)
Dept: UROLOGY | Facility: CLINIC | Age: 88
End: 2023-04-12
Payer: COMMERCIAL

## 2023-04-12 VITALS
TEMPERATURE: 97.8 F | HEART RATE: 58 BPM | SYSTOLIC BLOOD PRESSURE: 143 MMHG | HEIGHT: 70 IN | OXYGEN SATURATION: 98 % | WEIGHT: 160 LBS | RESPIRATION RATE: 18 BRPM | BODY MASS INDEX: 22.9 KG/M2 | DIASTOLIC BLOOD PRESSURE: 77 MMHG

## 2023-04-12 DIAGNOSIS — N21.0 CALCULUS IN BLADDER: ICD-10-CM

## 2023-04-12 PROCEDURE — 99214 OFFICE O/P EST MOD 30 MIN: CPT

## 2023-04-12 NOTE — HISTORY OF PRESENT ILLNESS
[FreeTextEntry1] : 87-year-old male status post cystolitholopaxy and TURP on Dec 2022. He states he feels well. \par \par very happy with urination- minimal leakage \par \par March 2023\par Total T - 892\par hematocrit = 50.8\par Estradiol - 75

## 2023-04-12 NOTE — ASSESSMENT
[FreeTextEntry1] : 87-year-old male status post cystolitholopaxy and TURP on Dec 2022. He states he feels well. \par \par very happy with urination- minimal leakage

## 2023-05-04 ENCOUNTER — NON-APPOINTMENT (OUTPATIENT)
Age: 88
End: 2023-05-04

## 2023-05-04 ENCOUNTER — APPOINTMENT (OUTPATIENT)
Dept: CARDIOLOGY | Facility: CLINIC | Age: 88
End: 2023-05-04
Payer: COMMERCIAL

## 2023-05-04 PROCEDURE — 93296 REM INTERROG EVL PM/IDS: CPT

## 2023-05-04 PROCEDURE — 93295 DEV INTERROG REMOTE 1/2/MLT: CPT

## 2023-05-19 ENCOUNTER — EMERGENCY (EMERGENCY)
Facility: HOSPITAL | Age: 88
LOS: 0 days | Discharge: HOME | End: 2023-05-20
Attending: EMERGENCY MEDICINE
Payer: COMMERCIAL

## 2023-05-19 VITALS
HEIGHT: 73 IN | SYSTOLIC BLOOD PRESSURE: 135 MMHG | OXYGEN SATURATION: 98 % | HEART RATE: 76 BPM | WEIGHT: 145.06 LBS | RESPIRATION RATE: 18 BRPM | TEMPERATURE: 99 F | DIASTOLIC BLOOD PRESSURE: 84 MMHG

## 2023-05-19 DIAGNOSIS — I50.9 HEART FAILURE, UNSPECIFIED: ICD-10-CM

## 2023-05-19 DIAGNOSIS — Z98.890 OTHER SPECIFIED POSTPROCEDURAL STATES: Chronic | ICD-10-CM

## 2023-05-19 DIAGNOSIS — K21.9 GASTRO-ESOPHAGEAL REFLUX DISEASE WITHOUT ESOPHAGITIS: ICD-10-CM

## 2023-05-19 DIAGNOSIS — R07.89 OTHER CHEST PAIN: ICD-10-CM

## 2023-05-19 DIAGNOSIS — N18.30 CHRONIC KIDNEY DISEASE, STAGE 3 UNSPECIFIED: ICD-10-CM

## 2023-05-19 DIAGNOSIS — I13.0 HYPERTENSIVE HEART AND CHRONIC KIDNEY DISEASE WITH HEART FAILURE AND STAGE 1 THROUGH STAGE 4 CHRONIC KIDNEY DISEASE, OR UNSPECIFIED CHRONIC KIDNEY DISEASE: ICD-10-CM

## 2023-05-19 DIAGNOSIS — Z82.49 FAMILY HISTORY OF ISCHEMIC HEART DISEASE AND OTHER DISEASES OF THE CIRCULATORY SYSTEM: ICD-10-CM

## 2023-05-19 DIAGNOSIS — Z87.891 PERSONAL HISTORY OF NICOTINE DEPENDENCE: ICD-10-CM

## 2023-05-19 DIAGNOSIS — Z95.810 PRESENCE OF AUTOMATIC (IMPLANTABLE) CARDIAC DEFIBRILLATOR: Chronic | ICD-10-CM

## 2023-05-19 DIAGNOSIS — Z95.0 PRESENCE OF CARDIAC PACEMAKER: ICD-10-CM

## 2023-05-19 DIAGNOSIS — R06.02 SHORTNESS OF BREATH: ICD-10-CM

## 2023-05-19 DIAGNOSIS — Z85.47 PERSONAL HISTORY OF MALIGNANT NEOPLASM OF TESTIS: ICD-10-CM

## 2023-05-19 DIAGNOSIS — E78.00 PURE HYPERCHOLESTEROLEMIA, UNSPECIFIED: ICD-10-CM

## 2023-05-19 DIAGNOSIS — Z95.0 PRESENCE OF CARDIAC PACEMAKER: Chronic | ICD-10-CM

## 2023-05-19 DIAGNOSIS — R07.9 CHEST PAIN, UNSPECIFIED: ICD-10-CM

## 2023-05-19 LAB
ALBUMIN SERPL ELPH-MCNC: 4.1 G/DL — SIGNIFICANT CHANGE UP (ref 3.5–5.2)
ALP SERPL-CCNC: 67 U/L — SIGNIFICANT CHANGE UP (ref 30–115)
ALT FLD-CCNC: 15 U/L — SIGNIFICANT CHANGE UP (ref 0–41)
ANION GAP SERPL CALC-SCNC: 10 MMOL/L — SIGNIFICANT CHANGE UP (ref 7–14)
ANISOCYTOSIS BLD QL: SLIGHT — SIGNIFICANT CHANGE UP
AST SERPL-CCNC: 20 U/L — SIGNIFICANT CHANGE UP (ref 0–41)
BASOPHILS # BLD AUTO: 0 K/UL — SIGNIFICANT CHANGE UP (ref 0–0.2)
BASOPHILS NFR BLD AUTO: 0 % — SIGNIFICANT CHANGE UP (ref 0–1)
BILIRUB SERPL-MCNC: 1.3 MG/DL — HIGH (ref 0.2–1.2)
BUN SERPL-MCNC: 32 MG/DL — HIGH (ref 10–20)
BURR CELLS BLD QL SMEAR: PRESENT — SIGNIFICANT CHANGE UP
CALCIUM SERPL-MCNC: 9.7 MG/DL — SIGNIFICANT CHANGE UP (ref 8.4–10.4)
CHLORIDE SERPL-SCNC: 106 MMOL/L — SIGNIFICANT CHANGE UP (ref 98–110)
CO2 SERPL-SCNC: 26 MMOL/L — SIGNIFICANT CHANGE UP (ref 17–32)
CREAT SERPL-MCNC: 1.7 MG/DL — HIGH (ref 0.7–1.5)
EGFR: 39 ML/MIN/1.73M2 — LOW
EOSINOPHIL # BLD AUTO: 0 K/UL — SIGNIFICANT CHANGE UP (ref 0–0.7)
EOSINOPHIL NFR BLD AUTO: 0 % — SIGNIFICANT CHANGE UP (ref 0–8)
GLUCOSE SERPL-MCNC: 82 MG/DL — SIGNIFICANT CHANGE UP (ref 70–99)
HCT VFR BLD CALC: 46.8 % — SIGNIFICANT CHANGE UP (ref 42–52)
HGB BLD-MCNC: 15.4 G/DL — SIGNIFICANT CHANGE UP (ref 14–18)
LYMPHOCYTES # BLD AUTO: 0.92 K/UL — LOW (ref 1.2–3.4)
LYMPHOCYTES # BLD AUTO: 17.4 % — LOW (ref 20.5–51.1)
MACROCYTES BLD QL: SIGNIFICANT CHANGE UP
MANUAL SMEAR VERIFICATION: SIGNIFICANT CHANGE UP
MCHC RBC-ENTMCNC: 32.9 G/DL — SIGNIFICANT CHANGE UP (ref 32–37)
MCHC RBC-ENTMCNC: 33.2 PG — HIGH (ref 27–31)
MCV RBC AUTO: 100.9 FL — HIGH (ref 80–94)
MONOCYTES # BLD AUTO: 0.41 K/UL — SIGNIFICANT CHANGE UP (ref 0.1–0.6)
MONOCYTES NFR BLD AUTO: 7.8 % — SIGNIFICANT CHANGE UP (ref 1.7–9.3)
NEUTROPHILS # BLD AUTO: 3.91 K/UL — SIGNIFICANT CHANGE UP (ref 1.4–6.5)
NEUTROPHILS NFR BLD AUTO: 73 % — SIGNIFICANT CHANGE UP (ref 42.2–75.2)
NEUTS BAND # BLD: 0.9 % — SIGNIFICANT CHANGE UP (ref 0–6)
NRBC # BLD: 1 /100 — HIGH (ref 0–0)
NRBC # BLD: SIGNIFICANT CHANGE UP /100 WBCS (ref 0–0)
NT-PROBNP SERPL-SCNC: 567 PG/ML — HIGH (ref 0–300)
OVALOCYTES BLD QL SMEAR: SLIGHT — SIGNIFICANT CHANGE UP
PLAT MORPH BLD: NORMAL — SIGNIFICANT CHANGE UP
PLATELET # BLD AUTO: 109 K/UL — LOW (ref 130–400)
PMV BLD: 11.2 FL — HIGH (ref 7.4–10.4)
POIKILOCYTOSIS BLD QL AUTO: SIGNIFICANT CHANGE UP
POTASSIUM SERPL-MCNC: 4.4 MMOL/L — SIGNIFICANT CHANGE UP (ref 3.5–5)
POTASSIUM SERPL-SCNC: 4.4 MMOL/L — SIGNIFICANT CHANGE UP (ref 3.5–5)
PROT SERPL-MCNC: 6.3 G/DL — SIGNIFICANT CHANGE UP (ref 6–8)
RBC # BLD: 4.64 M/UL — LOW (ref 4.7–6.1)
RBC # FLD: 13.5 % — SIGNIFICANT CHANGE UP (ref 11.5–14.5)
RBC BLD AUTO: ABNORMAL
SODIUM SERPL-SCNC: 142 MMOL/L — SIGNIFICANT CHANGE UP (ref 135–146)
TROPONIN T SERPL-MCNC: <0.01 NG/ML — SIGNIFICANT CHANGE UP
TROPONIN T SERPL-MCNC: <0.01 NG/ML — SIGNIFICANT CHANGE UP
VARIANT LYMPHS # BLD: 0.9 % — SIGNIFICANT CHANGE UP (ref 0–5)
WBC # BLD: 5.29 K/UL — SIGNIFICANT CHANGE UP (ref 4.8–10.8)
WBC # FLD AUTO: 5.29 K/UL — SIGNIFICANT CHANGE UP (ref 4.8–10.8)

## 2023-05-19 PROCEDURE — 93005 ELECTROCARDIOGRAM TRACING: CPT

## 2023-05-19 PROCEDURE — 85025 COMPLETE CBC W/AUTO DIFF WBC: CPT

## 2023-05-19 PROCEDURE — 80053 COMPREHEN METABOLIC PANEL: CPT

## 2023-05-19 PROCEDURE — 99285 EMERGENCY DEPT VISIT HI MDM: CPT | Mod: 25

## 2023-05-19 PROCEDURE — 83880 ASSAY OF NATRIURETIC PEPTIDE: CPT

## 2023-05-19 PROCEDURE — A9500: CPT

## 2023-05-19 PROCEDURE — 71045 X-RAY EXAM CHEST 1 VIEW: CPT

## 2023-05-19 PROCEDURE — 84484 ASSAY OF TROPONIN QUANT: CPT

## 2023-05-19 PROCEDURE — G0378: CPT

## 2023-05-19 PROCEDURE — 93017 CV STRESS TEST TRACING ONLY: CPT

## 2023-05-19 PROCEDURE — 93010 ELECTROCARDIOGRAM REPORT: CPT

## 2023-05-19 PROCEDURE — 71045 X-RAY EXAM CHEST 1 VIEW: CPT | Mod: 26

## 2023-05-19 PROCEDURE — 99223 1ST HOSP IP/OBS HIGH 75: CPT

## 2023-05-19 PROCEDURE — 78452 HT MUSCLE IMAGE SPECT MULT: CPT | Mod: MA

## 2023-05-19 PROCEDURE — 36415 COLL VENOUS BLD VENIPUNCTURE: CPT

## 2023-05-19 RX ORDER — SIMVASTATIN 20 MG/1
20 TABLET, FILM COATED ORAL AT BEDTIME
Refills: 0 | Status: DISCONTINUED | OUTPATIENT
Start: 2023-05-19 | End: 2023-05-20

## 2023-05-19 RX ORDER — METOPROLOL TARTRATE 50 MG
50 TABLET ORAL
Refills: 0 | Status: DISCONTINUED | OUTPATIENT
Start: 2023-05-19 | End: 2023-05-20

## 2023-05-19 RX ORDER — ADENOSINE 3 MG/ML
60 INJECTION INTRAVENOUS ONCE
Refills: 0 | Status: DISCONTINUED | OUTPATIENT
Start: 2023-05-19 | End: 2023-05-20

## 2023-05-19 RX ORDER — LOSARTAN POTASSIUM 100 MG/1
50 TABLET, FILM COATED ORAL DAILY
Refills: 0 | Status: DISCONTINUED | OUTPATIENT
Start: 2023-05-20 | End: 2023-05-20

## 2023-05-19 NOTE — ED PROVIDER NOTE - OBJECTIVE STATEMENT
87y Male w/ PMH HFrEF (15% s/p AICD & PPM) follows with Dr. Ascencio, HTN, CKD presents for worsening chest pain x 1.5 months. No inciting event or trauma, no alleviating/provoking factors, sharp L sided chest pain, no radiation. Associated SOB.  No fevers, URI sxs, leg pain, leg swelling, hemoptysis, hx of blood clots, recent travel/surgery/immobilization, N/V, diaphoresis.

## 2023-05-19 NOTE — ED ADULT TRIAGE NOTE - CHIEF COMPLAINT QUOTE
pt presents to ED c/o aching pains on left side of chest for a few weeks. pt associates pain with difficulty breathing

## 2023-05-19 NOTE — ED PROVIDER NOTE - CLINICAL SUMMARY MEDICAL DECISION MAKING FREE TEXT BOX
Seen and evaluated by Cardiology. Unknown etiology of 1.5 months not feeling well without any specific organ oriented complaint. It could be a generalized fatigue of severe CKD at the age 87 along with dilated CMP and mild anemia. Troponin negative. Will placed in EDOU for nuclear stress test.  EDOU team aware.

## 2023-05-19 NOTE — ED PROVIDER NOTE - NS ED ROS FT
Constitutional: No fevers, chills, or malaise.  HEENT: No headache, visual changes   Cardiac:  Reports chest pain and SOB. No leg edema, or leg pain.  Respiratory:  No cough or hemoptysis.  GI:  No nausea, vomiting, diarrhea, or abdominal pain.  Neuro:  No dizziness, LOC  Skin:  No skin rash.

## 2023-05-19 NOTE — ED CDU PROVIDER INITIAL DAY NOTE - NS ED ROS FT
Review of Systems    Constitutional: (-) fever, (-) chills  Eyes/ENT: (-) blurry vision, (-) epistaxis, (-) sore throat  Cardiovascular: (+) chest pain, (+) syncope  Respiratory: (-) cough, (-) shortness of breath  Gastrointestinal: (-) pain, (-) nausea, (-) vomiting, (-) diarrhea  Musculoskeletal: (-) neck pain, (-) back pain, (-) body aches  Integumentary: (-) rash, (-) edema  Neurological: (-) headache, (-) altered mental status  Psychiatric: (-) hallucinations  Allergic/Immunologic: (-) pruritus

## 2023-05-19 NOTE — ED CDU PROVIDER INITIAL DAY NOTE - CLINICAL SUMMARY MEDICAL DECISION MAKING FREE TEXT BOX
Patient to remain on continuous telemetry.  For repeat cardiac enzymes, repeat EKG and likely nuclear stress in the AM.  Case d/w cardiology Dr. Ascencio.

## 2023-05-19 NOTE — ED CDU PROVIDER INITIAL DAY NOTE - NSICDXPASTSURGICALHX_GEN_ALL_CORE_FT
PAST SURGICAL HISTORY:  Dual implantable cardioverter-defibrillator in situ     H/O excision of testicular mass (Bilateral with LN excision in 1988)    History of surgery gallstones removed    Pacemaker medtronics

## 2023-05-19 NOTE — CONSULT NOTE ADULT - SUBJECTIVE AND OBJECTIVE BOX
Patient is a 87y old  Male who presents with a chief complaint of left chest discomfort, no feeling well    HPI:  86 YO well known to me presented with 1.5 months hx of not feeling well, getting weak, occasional sob and having a chest discomfort on the left side of the chest on he anterior axillary line.  he adopted a diet has lost 22 pounds, has no orthopnea, PND, palpitation, edema,   well controlled chronic dilated cardiomyopathy since 2012.  at that time had a cardiac Cath in [facility], had a normal coronary arteries, EF 15%, responded to medical Rx, then AICD implant, BiV ICD synchronized pacing and over the years has been stable.  latest echo: EF 25-30%, latest nuclear test was remote, no ischemic defect    PAST MEDICAL & SURGICAL HISTORY:  Chronic CHF (congestive heart failure)  (EF=15)      Dyslipidemia      CKD (chronic kidney disease) stage 3, GFR 30-59 ml/min  (baseline Creat=1.8)      H/O testicular cancer  1988      Dyslipidemia      Former smoker      H/O excision of testicular mass  (Bilateral with LN excision in 1988)      Pacemaker  medtronics      Dual implantable cardioverter-defibrillator in situ      History of surgery  gallstones removed          PREVIOUS DIAGNOSTIC TESTING:      ECHO  FINDINGS: in the office 2/28/23: EF 25to max 30%, diastolic dysfunction, PA pressure 24 mmHg, mild AI, TR, PM wires in the RA, RV    STRESS TEST  FINDINGS: latest adenosine nuclear stress test was non ischemic in the office EF 35%    CATHETERIZATION  FINDINGS: remote, no CAD but cardiomyopathy    MEDICATIONS  (STANDING):  at home Losartan 50, Metoprolol 50 bid, Simvastatin 20 daily, could not tolerate Entresto dropped the BP significantly, symptomatic    MEDICATIONS  (PRN):      FAMILY HISTORY:  FH: CAD (coronary artery disease) (Father, Mother)  MI        SOCIAL HISTORY:  CIGARETTES: ex smoker  ALCOHOL: social drinker  DRUGS: no                      REVIEW OF SYSTEMS:  CONSTITUTIONAL: No distress, Looks stable, normal skin color  NECK: No pain or stiffnes  RESPIRATORY: No cough, wheezing, shortness of breath  CARDIOVASCULAR: left sided pain on the anterior axiliary line, SOB, no palpitations, no leg swelling, No orthopnea, No PND  GASTROINTESTINAL: No abdominal or epigastric pain. No nausea, vomiting  NEUROLOGICAL: No dizziness, headaches, no memory loss, loss of strength  SKIN: No itching, burning, rashes, or lesions   ENDOCRINE: Cold intolerance  MUSCULOSKELETAL: No joint pain, No  swelling;   ALLERGY: No hives, itching, rash          Vital Signs Last 24 Hrs  T(C): 36.6 (19 May 2023 16:10), Max: 37 (19 May 2023 13:56)  T(F): 97.8 (19 May 2023 16:10), Max: 98.6 (19 May 2023 13:56)  HR: 61 (19 May 2023 16:10) (61 - 76)  BP: 144/69 (19 May 2023 16:10) (135/84 - 144/69)  BP(mean): --  RR: 18 (19 May 2023 16:10) (18 - 18)  SpO2: 97% (19 May 2023 16:10) (97% - 98%)    Parameters below as of 19 May 2023 13:56  Patient On (Oxygen Delivery Method): room air                          PHYSICAL EXAM:  GENERAL: No distress, slender fit, lost 22 pounds  HEAD:  Atraumatic, Normocephalic  NECK: Supple, No JVD, No Bruit   NERVOUS SYSTEM:  Alert, Awake, Oriented to time, place, person; Normal memory and speech; Normal motor Strength 5/5 B/L upper and lower extremities  CHEST/LUNG: Normal air entry to lung base bilaterally; No wheeze, crackle, rales, rhonchi  HEART: Regular heart beat, S1, A2, P2, S3, No gallop, No murmur  ABDOMEN: Soft, Non tender, Non distended; Bowel sounds present  EXTREMITIES:  1+ Peripheral Pulses, No clubbing, No edema  SKIN: No rashes or lesions    TELEMETRY: not attached yet    ECG: atrial sensing ventricular pacing no ST-T changes    I&O's Detail      LABS:                        15.4   5.29  )-----------( x        ( 19 May 2023 15:45 )             46.8                   I&O's Summary      RADIOLOGY & ADDITIONAL STUDIES: clear lung, mildly dilated heart, 3 pm wires, no congestion

## 2023-05-19 NOTE — ED PROVIDER NOTE - RATE
Dear Mary Jo Healy MD,     We are contacting you regarding your patient's eligibility for a disease management program for diabetes.     The Metformin Dose Optimization program:  · Is a 12 week telephonic program with a clinical pharmacist  · Helps patients maximize their metformin therapy, improve glycemic control, and avoid the need for additional diabetes medications  · Includes counseling on adherence, lifestyle, and self-management  · Utilizes a dose titration protocol to increase metformin to target doses as tolerated  · Enhances in-between visit care at no additional cost to your patient      To enroll Soraya in this program, simply sign the pended \"Service to Pharmacist Medication Management\" referral order.     Her current metformin dose is: XR 1000mg daily  Her most recent A1c was   Hemoglobin A1C (%)   Date Value   12/01/2018 7.7 (H)   .     We will contact Soraya to initiate the program. As part of this program, a letter will be sent to the patient on your behalf. All patient outreach will be documented in Epic and can be accessed by you or your staff at any time. We will check in with you again once the program is complete.     Please feel free to contact us with any questions or concerns.     Thank you,     Santos Silva, [facility]  Population Health Department Clinical Pharmacist       61

## 2023-05-19 NOTE — CONSULT NOTE ADULT - ASSESSMENT
euvolemic chronic dilated cardiomyopathy  no prior hx of CAD based on a remote cath and stress nuclear test  atypical current chest pain  Unknown etiology of 1.5 months not feeling well without any specific organ oriented complaint? It could be a generalized fatigue of severe CKD at the age 87 along with dilated CMP and mild anemia?     I did not find evidence of ACS, decompensated HF, arrhythmia    suggest await for the blood test and troponin level  consider a nuclear stress test either as in patient or out patient (Adenosine nuclear stress test), and if test is really abnormal (more than a small- mild defect) then will attempt cath)  f/u with the nephrology as out patient   continue with current Rx (he was not able to tolerate Entresto, we tried and he finally asked me to stop it, dropping his BP severely and giving him fatigue, causing hyperkalemia and MELINDA)  limited options for further management specially considering CKD IV

## 2023-05-19 NOTE — ED PROVIDER NOTE - PHYSICAL EXAMINATION
GENERAL: NAD   SKIN: warm, dry  CARD: S1, S2 normal; no murmurs, gallops, or rubs. Regular rate and rhythm.   RESP: LCTAB; No wheezes, rales, rhonchi, or stridor.  ABD: soft, nontender, and nondistended  EXT: No LE TTP or edema bilaterally.  NEURO: Alert, oriented, grossly unremarkable  PSYCH: Cooperative, appropriate.

## 2023-05-19 NOTE — ED CDU PROVIDER INITIAL DAY NOTE - OBJECTIVE STATEMENT
88 yo M hx of CHF, HTN, PPM c/o intermittent left side chest pain associated with SOB x 1 month that is worse with walking. No n/v, abdominal pain, leg pain or leg swelling.

## 2023-05-20 VITALS
HEART RATE: 57 BPM | SYSTOLIC BLOOD PRESSURE: 163 MMHG | TEMPERATURE: 98 F | DIASTOLIC BLOOD PRESSURE: 72 MMHG | OXYGEN SATURATION: 100 % | RESPIRATION RATE: 18 BRPM

## 2023-05-20 PROCEDURE — 99238 HOSP IP/OBS DSCHRG MGMT 30/<: CPT

## 2023-05-20 PROCEDURE — 93018 CV STRESS TEST I&R ONLY: CPT

## 2023-05-20 PROCEDURE — 93016 CV STRESS TEST SUPVJ ONLY: CPT

## 2023-05-20 PROCEDURE — 78452 HT MUSCLE IMAGE SPECT MULT: CPT | Mod: 26,MA

## 2023-05-20 RX ADMIN — LOSARTAN POTASSIUM 50 MILLIGRAM(S): 100 TABLET, FILM COATED ORAL at 06:53

## 2023-05-20 RX ADMIN — Medication 50 MILLIGRAM(S): at 06:53

## 2023-05-20 RX ADMIN — SIMVASTATIN 20 MILLIGRAM(S): 20 TABLET, FILM COATED ORAL at 00:27

## 2023-05-20 NOTE — ED CDU PROVIDER SUBSEQUENT DAY NOTE - NSICDXPASTMEDICALHX_GEN_ALL_CORE_FT
PAST MEDICAL HISTORY:  Chronic CHF (congestive heart failure) (EF=15)    CKD (chronic kidney disease) stage 3, GFR 30-59 ml/min (baseline Creat=1.8)    Dyslipidemia     Dyslipidemia     Former smoker     H/O testicular cancer 1988    HTN (hypertension)

## 2023-05-20 NOTE — ED CDU PROVIDER SUBSEQUENT DAY NOTE - PHYSICAL EXAMINATION
CONST: Well appearing in NAD  EYES: PERRL, EOMI, Sclera and conjunctiva clear.     NECK: Non-tender, no meningeal signs  CARD: Normal S1 S2; Normal rate and rhythm  RESP: Equal BS B/L, No wheezes, rhonchi or rales. No distress  GI: Soft, non-tender, non-distended.  MS: Normal ROM in all extremities. No midline spinal tenderness.  SKIN: Warm, dry, no acute rashes. Good turgor  NEURO: A&Ox3, No focal deficits. Strength 5/5 with no sensory deficits. Steady gait

## 2023-05-20 NOTE — ED CDU PROVIDER DISPOSITION NOTE - CLINICAL COURSE
87-year-old with multiple cardiovascular medical problems, presents with chest discomfort that is somewhat atypical for 1.5 months.  Stress test done and no concerns for active ischemia.  Well-appearing.  Discussed results with his cardiologist who feels that it is likely not cardiac in etiology and is stable for discharge.  Discussed results with patient and family and advised that they need close follow-up with primary care and cardiology.  Return precaution discussed

## 2023-05-20 NOTE — ED CDU PROVIDER DISPOSITION NOTE - PATIENT PORTAL LINK FT
You can access the FollowMyHealth Patient Portal offered by [facility] by registering at the following website: [email or web address]. By joining [facility]’s FollowMyHealth portal, you will also be able to view your health information using other applications (apps) compatible with our system.

## 2023-05-20 NOTE — ED CDU PROVIDER SUBSEQUENT DAY NOTE - CLINICAL SUMMARY MEDICAL DECISION MAKING FREE TEXT BOX
87-year-old with 1.5 months of chest discomfort.  Currently chest pain-free at rest.  No shortness of breath.  Past medical history significant as above.  Pending stress test and then will follow-up with cardiology.

## 2023-05-20 NOTE — ED CDU PROVIDER DISPOSITION NOTE - NSFOLLOWUPINSTRUCTIONS_ED_ALL_ED_FT
Chest Pain    Chest pain can be caused by many different conditions which may or may not be dangerous. Causes include heartburn, lung infections, heart attack, blood clot in lungs, skin infections, strain or damage to muscle, cartilage, or bones, etc. In addition to a history and physical examination, an electrocardiogram (ECG) or other lab tests may have been performed to determine the cause of your chest pain. Follow up with your primary care provider or with a cardiologist as instructed.     SEEK IMMEDIATE MEDICAL CARE IF YOU HAVE ANY OF THE FOLLOWING SYMPTOMS: worsening chest pain, coughing up blood, unexplained back/neck/jaw pain, severe abdominal pain, dizziness or lightheadedness, fainting, shortness of breath, sweaty or clammy skin, vomiting, or racing heart beat. These symptoms may represent a serious problem that is an emergency. Do not wait to see if the symptoms will go away. Get medical help right away. Call 911 and do not drive yourself to the hospital. Our Emergency Department Referral Coordinators will be reaching out to you in the next 24-48 hours from 9:00am to 5:00pm with a follow up appointment. Please expect a phone call from the hospital in that time frame. If you do not receive a call or if you have any questions or concerns, you can reach them at   [phone]     Chest Pain    Chest pain can be caused by many different conditions which may or may not be dangerous. Causes include heartburn, lung infections, heart attack, blood clot in lungs, skin infections, strain or damage to muscle, cartilage, or bones, etc. In addition to a history and physical examination, an electrocardiogram (ECG) or other lab tests may have been performed to determine the cause of your chest pain. Follow up with your primary care provider or with a cardiologist as instructed.     SEEK IMMEDIATE MEDICAL CARE IF YOU HAVE ANY OF THE FOLLOWING SYMPTOMS: worsening chest pain, coughing up blood, unexplained back/neck/jaw pain, severe abdominal pain, dizziness or lightheadedness, fainting, shortness of breath, sweaty or clammy skin, vomiting, or racing heart beat. These symptoms may represent a serious problem that is an emergency. Do not wait to see if the symptoms will go away. Get medical help right away. Call 911 and do not drive yourself to the hospital.

## 2023-05-20 NOTE — ED CDU PROVIDER DISPOSITION NOTE - CARE PROVIDER_API CALL
Ki Ascencio)  Cardiology; Internal Medicine; Nuclear Cardiology  [address] [place] [address]  Phone: [phone]  Fax: [phone]  Follow Up Time: 1-3 Days

## 2023-05-20 NOTE — ED ADULT NURSE NOTE - NSFALLUNIVINTERV_ED_ALL_ED
Bed/Stretcher in lowest position, wheels locked, appropriate side rails in place/Call bell, personal items and telephone in reach/Instruct patient to call for assistance before getting out of bed/chair/stretcher/Non-slip footwear applied when patient is off stretcher/[place] to call system/Physically safe environment - no spills, clutter or unnecessary equipment/Purposeful proactive rounding/Room/bathroom lighting operational, light cord in reach

## 2023-06-15 NOTE — H&P ADULT - PROBLEM SELECTOR PLAN 1
Impression: S/P Cataract Extraction by phacoemulsification with IOL placement; cautery OS - 1 Day. Presence of intraocular lens  Z96.1. Plan: Follow post op instructions per surgeon and RTC as scheduled or ASAP if pain, decreased vision, or any worsening of condition.
* Closed loop small bowel obstruction.    - Case d/w Dr. Geronimo and states given patient's age and comorbidities including CHF and EF of 15, that patient is at an extremely high risk for surgical intervention and recommends to transfer to [facility] SICU for mgt.    Dr. Geronimo states he discussed with Dr. Smith in SICU at [facility] and she accepted patient to the SICU and will have a goals of care with patient upon arrival and try and treat conservatively.     - Dr. Geronimo recommends continue NPO and NGT placement prior to transfer.      - I relayed Dr. Geronimo's above plan & recommendations to Dr. King in ER and she states in process of transferring to [facility] with ambulance in the [facility] ER currently.      - I discussed and signed out to the [place] senior resident [employer] [id number].     - I also d/w Dr. Qiu and agrees with transfer.     - Patient aware of transfer to [facility] and verbalized understanding of plan and all questions answered to patient's satisfaction.

## 2023-08-03 ENCOUNTER — APPOINTMENT (OUTPATIENT)
Dept: ELECTROPHYSIOLOGY | Facility: CLINIC | Age: 88
End: 2023-08-03

## 2023-08-03 ENCOUNTER — NON-APPOINTMENT (OUTPATIENT)
Age: 88
End: 2023-08-03

## 2023-08-10 ENCOUNTER — NON-APPOINTMENT (OUTPATIENT)
Age: 88
End: 2023-08-10

## 2023-08-10 ENCOUNTER — APPOINTMENT (OUTPATIENT)
Dept: ELECTROPHYSIOLOGY | Facility: CLINIC | Age: 88
End: 2023-08-10
Payer: COMMERCIAL

## 2023-08-10 PROCEDURE — 93284 PRGRMG EVAL IMPLANTABLE DFB: CPT

## 2023-08-10 PROCEDURE — 93290 INTERROG DEV EVAL ICPMS IP: CPT

## 2023-08-10 RX ORDER — LOSARTAN POTASSIUM 50 MG/1
50 TABLET, FILM COATED ORAL
Qty: 90 | Refills: 1 | Status: ACTIVE | COMMUNITY
Start: 2023-08-10

## 2023-08-10 RX ORDER — METOPROLOL TARTRATE 50 MG/1
50 TABLET, FILM COATED ORAL
Qty: 60 | Refills: 3 | Status: ACTIVE | COMMUNITY
Start: 2023-08-10

## 2023-08-10 RX ORDER — SIMVASTATIN 20 MG/1
20 TABLET, FILM COATED ORAL
Refills: 0 | Status: ACTIVE | COMMUNITY
Start: 2023-08-10

## 2023-08-13 RX ORDER — ANASTROZOLE TABLETS 1 MG/1
1 TABLET ORAL DAILY
Qty: 90 | Refills: 3 | Status: ACTIVE | COMMUNITY
Start: 2023-08-13 | End: 1900-01-01

## 2023-09-11 ENCOUNTER — APPOINTMENT (OUTPATIENT)
Dept: UROLOGY | Facility: CLINIC | Age: 88
End: 2023-09-11
Payer: COMMERCIAL

## 2023-09-11 DIAGNOSIS — R41.0 DISORIENTATION, UNSPECIFIED: ICD-10-CM

## 2023-09-11 DIAGNOSIS — R51.9 HEADACHE, UNSPECIFIED: ICD-10-CM

## 2023-09-11 PROCEDURE — 99214 OFFICE O/P EST MOD 30 MIN: CPT | Mod: 95

## 2023-10-04 ENCOUNTER — APPOINTMENT (OUTPATIENT)
Dept: UROLOGY | Facility: CLINIC | Age: 88
End: 2023-10-04
Payer: COMMERCIAL

## 2023-10-04 VITALS
DIASTOLIC BLOOD PRESSURE: 84 MMHG | WEIGHT: 145 LBS | HEART RATE: 58 BPM | TEMPERATURE: 98 F | BODY MASS INDEX: 20.76 KG/M2 | HEIGHT: 70 IN | SYSTOLIC BLOOD PRESSURE: 138 MMHG

## 2023-10-04 DIAGNOSIS — N13.8 BENIGN PROSTATIC HYPERPLASIA WITH LOWER URINARY TRACT SYMPMS: ICD-10-CM

## 2023-10-04 DIAGNOSIS — N40.1 BENIGN PROSTATIC HYPERPLASIA WITH LOWER URINARY TRACT SYMPMS: ICD-10-CM

## 2023-10-04 PROCEDURE — 99214 OFFICE O/P EST MOD 30 MIN: CPT

## 2023-11-02 ENCOUNTER — NON-APPOINTMENT (OUTPATIENT)
Age: 88
End: 2023-11-02

## 2023-11-02 ENCOUNTER — APPOINTMENT (OUTPATIENT)
Dept: CARDIOLOGY | Facility: CLINIC | Age: 88
End: 2023-11-02
Payer: COMMERCIAL

## 2023-11-02 PROCEDURE — 93296 REM INTERROG EVL PM/IDS: CPT

## 2023-11-02 PROCEDURE — 93295 DEV INTERROG REMOTE 1/2/MLT: CPT

## 2024-02-01 ENCOUNTER — APPOINTMENT (OUTPATIENT)
Dept: CARDIOLOGY | Facility: CLINIC | Age: 89
End: 2024-02-01
Payer: COMMERCIAL

## 2024-02-01 ENCOUNTER — NON-APPOINTMENT (OUTPATIENT)
Age: 89
End: 2024-02-01

## 2024-02-01 PROCEDURE — 93296 REM INTERROG EVL PM/IDS: CPT

## 2024-02-01 PROCEDURE — 93295 DEV INTERROG REMOTE 1/2/MLT: CPT

## 2024-02-07 ENCOUNTER — APPOINTMENT (OUTPATIENT)
Dept: UROLOGY | Facility: CLINIC | Age: 89
End: 2024-02-07
Payer: COMMERCIAL

## 2024-02-07 VITALS
SYSTOLIC BLOOD PRESSURE: 152 MMHG | HEIGHT: 70 IN | TEMPERATURE: 96.4 F | DIASTOLIC BLOOD PRESSURE: 88 MMHG | HEART RATE: 62 BPM | RESPIRATION RATE: 16 BRPM | BODY MASS INDEX: 20.76 KG/M2 | WEIGHT: 145 LBS

## 2024-02-07 PROCEDURE — 99213 OFFICE O/P EST LOW 20 MIN: CPT

## 2024-02-07 PROCEDURE — G2211 COMPLEX E/M VISIT ADD ON: CPT | Mod: NC,1L

## 2024-02-07 NOTE — PLAN
[TextEntry] : can return to two pumps a day of androgel labs in 3 months and follow up with Dr Kevin thereafter

## 2024-05-08 ENCOUNTER — APPOINTMENT (OUTPATIENT)
Dept: UROLOGY | Facility: CLINIC | Age: 89
End: 2024-05-08
Payer: COMMERCIAL

## 2024-05-08 DIAGNOSIS — E29.1 TESTICULAR HYPOFUNCTION: ICD-10-CM

## 2024-05-08 DIAGNOSIS — N52.01 ERECTILE DYSFUNCTION DUE TO ARTERIAL INSUFFICIENCY: ICD-10-CM

## 2024-05-08 DIAGNOSIS — R79.89 OTHER SPECIFIED ABNORMAL FINDINGS OF BLOOD CHEMISTRY: ICD-10-CM

## 2024-05-08 PROCEDURE — G2211 COMPLEX E/M VISIT ADD ON: CPT | Mod: NC,1L

## 2024-05-08 PROCEDURE — 99214 OFFICE O/P EST MOD 30 MIN: CPT

## 2024-05-08 RX ORDER — TESTOSTERONE 16.2 MG/G
20.25 MG/ACT GEL TRANSDERMAL
Qty: 3 | Refills: 0 | Status: ACTIVE | COMMUNITY
Start: 2018-03-01 | End: 1900-01-01

## 2024-05-08 NOTE — HISTORY OF PRESENT ILLNESS
[FreeTextEntry1] : 88-year-old male status post cystolitholopaxy and TURP on Dec 2022. He states he feels well. also takes topical testosterone for low testosterone -- but when he was changed to one pump a day started feeling recurrence of symptoms His estrodiol levels were high at 75 - we started anastrozole but had fogginess/confusion and headache so he stopped after a few days so that was stopped  March 2023 Total T - 892 hematocrit = 50.8 Estradiol - 75.  sept 2023 Complete Blood Count; hct - normal Estradiol, Serum;55 Hepatic Function Panel; normal Testosterone, Total - 278  Jan 2024 Total T -- 132 Hct - 41 estradiol - 29  Office visit 5/8/24 Patient has been applying topical testosterone 2 pumps 5 days a week with 2 days nothing.  He reports that he has had no hypogonadal symptoms since starting this regimen.  However upon review his labs his estrogen levels are still above upper limits of normal.  In addition his free and bioavailable testosterone are also above upper limits of normal.  Does not report having any bothersome lower urinary tract symptoms.  He is not sexually active and is not interested in having erections.  Labs 4/11/24  Estradiol 62 HCT 45.7 LFTS wnl

## 2024-05-08 NOTE — PLAN

## 2024-05-08 NOTE — ASSESSMENT
[FreeTextEntry1] : #Male Hypogonadism -patient currently was taking 2 pumps of topical testosterone 5 days a week per previous regimen with Dr. Garces.  Given patient's elevated free and bioavailable testosterone including his elevated estrogen levels the following regimen was started for patient.  - Switched to 1pump Topical testosterone daily and Arimidex 1mg q2 days.  Will consider discontinuing Arimidex if his estradiol levels returned to normal limits and his T levels remain within normal limits with 1 of testosterone -  Testosterone, SHBG, Estrogen, LFT, CBC in 3 months He return to see me in 3 months to reassess his symptoms and go over lab work-

## 2024-05-09 ENCOUNTER — APPOINTMENT (OUTPATIENT)
Dept: ELECTROPHYSIOLOGY | Facility: CLINIC | Age: 89
End: 2024-05-09
Payer: COMMERCIAL

## 2024-05-09 VITALS
DIASTOLIC BLOOD PRESSURE: 80 MMHG | WEIGHT: 151 LBS | BODY MASS INDEX: 21.62 KG/M2 | HEIGHT: 70 IN | SYSTOLIC BLOOD PRESSURE: 150 MMHG

## 2024-05-09 VITALS — SYSTOLIC BLOOD PRESSURE: 128 MMHG | DIASTOLIC BLOOD PRESSURE: 84 MMHG

## 2024-05-09 DIAGNOSIS — Z45.02 ENCOUNTER FOR ADJUSTMENT AND MANAGEMENT OF AUTOMATIC IMPLANTABLE CARDIAC DEFIBRILLATOR: ICD-10-CM

## 2024-05-09 DIAGNOSIS — I50.22 CHRONIC SYSTOLIC (CONGESTIVE) HEART FAILURE: ICD-10-CM

## 2024-05-09 DIAGNOSIS — I10 ESSENTIAL (PRIMARY) HYPERTENSION: ICD-10-CM

## 2024-05-09 PROCEDURE — 99213 OFFICE O/P EST LOW 20 MIN: CPT | Mod: 25

## 2024-05-09 PROCEDURE — 93284 PRGRMG EVAL IMPLANTABLE DFB: CPT

## 2024-05-09 PROCEDURE — 93290 INTERROG DEV EVAL ICPMS IP: CPT

## 2024-05-09 NOTE — ASSESSMENT
[FreeTextEntry1] : Heart failure s/p CRT-D  - Device interrogation normal. I interrogated and reprogrammed the device as described above. - No events. - The patient is on remote and transmitting.   HTN - Manual recheck improved.  - Continue losartan 50 mg QD. - Continue Metoprolol 50 mg BID.  RTO in 9-12 months

## 2024-05-09 NOTE — PROCEDURE
[Complete Heart Block] : complete heart block [CRT-D] : Cardiac resynchronization therapy defibrillator [Medtronic] : Medtronic [DDD] : DDD [Voltage: ___ volts] : Voltage was [unfilled] volts [Charge Time: ___ sec] : charge time was [unfilled] seconds [Longevity: ___ months] : The estimated remaining battery life is [unfilled] months [Threshold Testing Performed] : Threshold testing was performed [Atrial] : Atrial [Ventricular] : Ventricular [Sensing Amplitude ___mv] : sensing amplitude was [unfilled] mv [Lead Imp:  ___ohms] : lead impedance was [unfilled] ohms [None] : none [Programmed for Longevity] : output reprogrammed for improved battery longevity [Asense-Vsense ___ %] : Asense-Vsense [unfilled]% [Asense-Vpace ___ %] : Asense-Vpace [unfilled]% [Apace-Vsense ___ %] : Apace-Vsense [unfilled]% [Apace-Vpace ___ %] : Apace-Vpace [unfilled]% [de-identified] : 56 BPM [de-identified] : BECKY [de-identified] : Glen MRI Quad [id number] [de-identified] : [id number] [de-identified] : 5/2/2019 [de-identified] :  [de-identified] : No events Effective CRT99.7% Optivol below threshold Transmitting on [facility] PM DEPENDENT!!

## 2024-05-09 NOTE — PROCEDURE
[Complete Heart Block] : complete heart block [CRT-D] : Cardiac resynchronization therapy defibrillator [Medtronic] : Medtronic [DDD] : DDD [Voltage: ___ volts] : Voltage was [unfilled] volts [Charge Time: ___ sec] : charge time was [unfilled] seconds [Longevity: ___ months] : The estimated remaining battery life is [unfilled] months [Threshold Testing Performed] : Threshold testing was performed [Atrial] : Atrial [Ventricular] : Ventricular [Sensing Amplitude ___mv] : sensing amplitude was [unfilled] mv [Lead Imp:  ___ohms] : lead impedance was [unfilled] ohms [None] : none [Programmed for Longevity] : output reprogrammed for improved battery longevity [Asense-Vsense ___ %] : Asense-Vsense [unfilled]% [Asense-Vpace ___ %] : Asense-Vpace [unfilled]% [Apace-Vsense ___ %] : Apace-Vsense [unfilled]% [Apace-Vpace ___ %] : Apace-Vpace [unfilled]% [de-identified] : 56 BPM [de-identified] : BECKY [de-identified] : Glen MRI Quad [id number] [de-identified] : [id number] [de-identified] : 5/2/2019 [de-identified] :  [de-identified] : No events Effective CRT99.7% Optivol below threshold Transmitting on [facility] PM DEPENDENT!!

## 2024-05-09 NOTE — HISTORY OF PRESENT ILLNESS
[de-identified] : 11 yo M pmh of HTN, HLD, HFrEF with AICD.  The patient is feeling well and has no cardiac complaints. Denies CP, palpitations, SOB, dizziness, ROUSE, PND, syncope.

## 2024-05-09 NOTE — HISTORY OF PRESENT ILLNESS
[de-identified] : 9 yo M pmh of HTN, HLD, HFrEF with AICD.  The patient is feeling well and has no cardiac complaints. Denies CP, palpitations, SOB, dizziness, ROUSE, PND, syncope.

## 2024-05-09 NOTE — PHYSICAL EXAM
[General Appearance - Well Developed] : well developed [Normal Appearance] : normal appearance [Well Groomed] : well groomed [General Appearance - Well Nourished] : well nourished [No Deformities] : no deformities [General Appearance - In No Acute Distress] : no acute distress [Heart Rate And Rhythm] : heart rate and rhythm were normal [Heart Sounds] : normal S1 and S2 [Murmurs] : no murmurs present [Arterial Pulses Normal] : the arterial pulses were normal [] : no respiratory distress [Respiration, Rhythm And Depth] : normal respiratory rhythm and effort [Exaggerated Use Of Accessory Muscles For Inspiration] : no accessory muscle use [Auscultation Breath Sounds / Voice Sounds] : lungs were clear to auscultation bilaterally [Left Infraclavicular] : left infraclavicular area [Well-Healed] : well-healed [Abdomen Soft] : soft [Nail Clubbing] : no clubbing of the fingernails [Cyanosis, Localized] : no localized cyanosis

## 2024-07-08 ENCOUNTER — OUTPATIENT (OUTPATIENT)
Dept: OUTPATIENT SERVICES | Facility: HOSPITAL | Age: 89
LOS: 1 days | Discharge: HOME | End: 2024-07-08
Payer: COMMERCIAL

## 2024-07-08 ENCOUNTER — TRANSCRIPTION ENCOUNTER (OUTPATIENT)
Age: 89
End: 2024-07-08

## 2024-07-08 VITALS
DIASTOLIC BLOOD PRESSURE: 88 MMHG | WEIGHT: 145.06 LBS | OXYGEN SATURATION: 98 % | HEIGHT: 70 IN | TEMPERATURE: 97 F | SYSTOLIC BLOOD PRESSURE: 170 MMHG | HEART RATE: 63 BPM | RESPIRATION RATE: 16 BRPM

## 2024-07-08 VITALS
HEART RATE: 59 BPM | OXYGEN SATURATION: 94 % | DIASTOLIC BLOOD PRESSURE: 68 MMHG | RESPIRATION RATE: 18 BRPM | SYSTOLIC BLOOD PRESSURE: 114 MMHG

## 2024-07-08 DIAGNOSIS — Z98.890 OTHER SPECIFIED POSTPROCEDURAL STATES: Chronic | ICD-10-CM

## 2024-07-08 DIAGNOSIS — I35.9 NONRHEUMATIC AORTIC VALVE DISORDER, UNSPECIFIED: ICD-10-CM

## 2024-07-08 DIAGNOSIS — Z95.810 PRESENCE OF AUTOMATIC (IMPLANTABLE) CARDIAC DEFIBRILLATOR: Chronic | ICD-10-CM

## 2024-07-08 DIAGNOSIS — Z95.0 PRESENCE OF CARDIAC PACEMAKER: Chronic | ICD-10-CM

## 2024-07-08 LAB
HCT VFR BLD CALC: 46.3 % — SIGNIFICANT CHANGE UP (ref 42–52)
HGB BLD-MCNC: 15.1 G/DL — SIGNIFICANT CHANGE UP (ref 14–18)
MCHC RBC-ENTMCNC: 32.6 G/DL — SIGNIFICANT CHANGE UP (ref 32–37)
MCHC RBC-ENTMCNC: 32.8 PG — HIGH (ref 27–31)
MCV RBC AUTO: 100.4 FL — HIGH (ref 80–94)
NRBC # BLD: 0 /100 WBCS — SIGNIFICANT CHANGE UP (ref 0–0)
PLATELET # BLD AUTO: 121 K/UL — LOW (ref 130–400)
PMV BLD: 10.8 FL — HIGH (ref 7.4–10.4)
RBC # BLD: 4.61 M/UL — LOW (ref 4.7–6.1)
RBC # FLD: 13 % — SIGNIFICANT CHANGE UP (ref 11.5–14.5)
WBC # BLD: 6.58 K/UL — SIGNIFICANT CHANGE UP (ref 4.8–10.8)
WBC # FLD AUTO: 6.58 K/UL — SIGNIFICANT CHANGE UP (ref 4.8–10.8)

## 2024-07-08 PROCEDURE — 93458 L HRT ARTERY/VENTRICLE ANGIO: CPT

## 2024-07-08 PROCEDURE — C1894: CPT

## 2024-07-08 PROCEDURE — C1887: CPT

## 2024-07-08 PROCEDURE — 85027 COMPLETE CBC AUTOMATED: CPT

## 2024-07-08 PROCEDURE — C1769: CPT

## 2024-07-08 PROCEDURE — 36415 COLL VENOUS BLD VENIPUNCTURE: CPT

## 2024-07-08 RX ORDER — SODIUM CHLORIDE 0.9 % (FLUSH) 0.9 %
1000 SYRINGE (ML) INJECTION
Refills: 0 | Status: DISCONTINUED | OUTPATIENT
Start: 2024-07-08 | End: 2024-07-08

## 2024-07-08 RX ORDER — AMLODIPINE BESYLATE 2.5 MG/1
2.5 TABLET ORAL ONCE
Refills: 0 | Status: COMPLETED | OUTPATIENT
Start: 2024-07-08 | End: 2024-07-08

## 2024-07-08 RX ORDER — TESTOSTERONE CYPIONATE 200 MG/ML
1 INJECTION, SOLUTION INTRAMUSCULAR
Refills: 0 | DISCHARGE

## 2024-07-08 RX ORDER — ANASTROZOLE 1 MG/1
1 TABLET ORAL
Refills: 0 | DISCHARGE

## 2024-07-08 RX ORDER — ASPIRIN 325 MG/1
81 TABLET, FILM COATED ORAL ONCE
Refills: 0 | Status: COMPLETED | OUTPATIENT
Start: 2024-07-08 | End: 2024-07-08

## 2024-07-08 RX ADMIN — ASPIRIN 81 MILLIGRAM(S): 325 TABLET, FILM COATED ORAL at 07:36

## 2024-07-08 RX ADMIN — AMLODIPINE BESYLATE 2.5 MILLIGRAM(S): 2.5 TABLET ORAL at 09:06

## 2024-07-08 RX ADMIN — Medication 150 MILLILITER(S): at 08:52

## 2024-07-08 RX ADMIN — Medication 100 MILLILITER(S): at 07:37

## 2024-07-09 DIAGNOSIS — I10 ESSENTIAL (PRIMARY) HYPERTENSION: ICD-10-CM

## 2024-07-09 DIAGNOSIS — I25.10 ATHEROSCLEROTIC HEART DISEASE OF NATIVE CORONARY ARTERY WITHOUT ANGINA PECTORIS: ICD-10-CM

## 2024-07-09 DIAGNOSIS — E78.5 HYPERLIPIDEMIA, UNSPECIFIED: ICD-10-CM

## 2024-07-09 DIAGNOSIS — I50.23 ACUTE ON CHRONIC SYSTOLIC (CONGESTIVE) HEART FAILURE: ICD-10-CM

## 2024-08-08 ENCOUNTER — NON-APPOINTMENT (OUTPATIENT)
Age: 89
End: 2024-08-08

## 2024-08-08 ENCOUNTER — APPOINTMENT (OUTPATIENT)
Dept: UROLOGY | Facility: CLINIC | Age: 89
End: 2024-08-08

## 2024-08-08 ENCOUNTER — APPOINTMENT (OUTPATIENT)
Dept: CARDIOLOGY | Facility: CLINIC | Age: 89
End: 2024-08-08

## 2024-08-08 PROCEDURE — 93294 REM INTERROG EVL PM/LDLS PM: CPT

## 2024-08-08 PROCEDURE — G2211 COMPLEX E/M VISIT ADD ON: CPT | Mod: NC

## 2024-08-08 PROCEDURE — 99214 OFFICE O/P EST MOD 30 MIN: CPT

## 2024-08-08 PROCEDURE — 93296 REM INTERROG EVL PM/IDS: CPT

## 2024-08-08 NOTE — HISTORY OF PRESENT ILLNESS
[FreeTextEntry1] : 88-year-old male status post cystolitholopaxy and TURP on Dec 2022. He states he feels well. also takes topical testosterone for low testosterone -- but when he was changed to one pump a day started feeling recurrence of symptoms His estrodiol levels were high at 75 - we started anastrozole but had fogginess/confusion and headache so he stopped after a few days   March 2023 Total T - 892 hematocrit = 50.8 Estradiol - 75.  sept 2023 Complete Blood Count; hct - normal Estradiol, Serum;55 Hepatic Function Panel; normal Testosterone, Total - 278  Jan 2024 Total T -- 132 Hct - 41 estradiol - 29  Office visit 5/8/24 Patient has been applying topical testosterone 2 pumps 5 days a week with 2 days nothing. He reports that he has had no hypogonadal symptoms since starting this regimen. However upon review his labs his estrogen levels are still above upper limits of normal. In addition his free and bioavailable testosterone are also above upper limits of normal. Does not report having any bothersome lower urinary tract symptoms. He is not sexually active and is not interested in having erections.  Labs 4/11/24  Estradiol 62 HCT 45.7 LFTS wnl  Office visit 8/8/24 Patient presents today with no new acute complaints.  He has been very happy with his current regimen of topical testosterone and anastrozole every 2 days.  We reviewed his hormone labs as listed below were EXTR were within normal limits.  Patient otherwise denies any hypogonadal symptoms, no chest pain shortness of breath or stroke like symptoms.  Reports that he is not interested in treating his erectile dysfunction symptoms at this time.  Labs 7/2024  Hct 45.2 Estradiol 26

## 2024-08-08 NOTE — PLAN

## 2024-11-07 ENCOUNTER — APPOINTMENT (OUTPATIENT)
Dept: CARDIOLOGY | Facility: CLINIC | Age: 89
End: 2024-11-07
Payer: COMMERCIAL

## 2024-11-07 ENCOUNTER — NON-APPOINTMENT (OUTPATIENT)
Age: 89
End: 2024-11-07

## 2024-11-07 PROCEDURE — 93296 REM INTERROG EVL PM/IDS: CPT

## 2024-11-07 PROCEDURE — 93295 DEV INTERROG REMOTE 1/2/MLT: CPT

## 2024-12-05 ENCOUNTER — APPOINTMENT (OUTPATIENT)
Dept: UROLOGY | Facility: CLINIC | Age: 89
End: 2024-12-05
Payer: COMMERCIAL

## 2024-12-05 ENCOUNTER — LABORATORY RESULT (OUTPATIENT)
Age: 89
End: 2024-12-05

## 2024-12-05 VITALS
TEMPERATURE: 97.6 F | HEART RATE: 66 BPM | OXYGEN SATURATION: 98 % | HEIGHT: 70 IN | DIASTOLIC BLOOD PRESSURE: 88 MMHG | RESPIRATION RATE: 16 BRPM | WEIGHT: 147 LBS | BODY MASS INDEX: 21.05 KG/M2 | SYSTOLIC BLOOD PRESSURE: 149 MMHG

## 2024-12-05 DIAGNOSIS — N40.1 BENIGN PROSTATIC HYPERPLASIA WITH LOWER URINARY TRACT SYMPMS: ICD-10-CM

## 2024-12-05 DIAGNOSIS — R39.9 UNSPECIFIED SYMPTOMS AND SIGNS INVOLVING THE GENITOURINARY SYSTEM: ICD-10-CM

## 2024-12-05 DIAGNOSIS — E29.1 TESTICULAR HYPOFUNCTION: ICD-10-CM

## 2024-12-05 DIAGNOSIS — N13.8 BENIGN PROSTATIC HYPERPLASIA WITH LOWER URINARY TRACT SYMPMS: ICD-10-CM

## 2024-12-05 PROCEDURE — G2211 COMPLEX E/M VISIT ADD ON: CPT | Mod: NC

## 2024-12-05 PROCEDURE — 99215 OFFICE O/P EST HI 40 MIN: CPT

## 2024-12-05 RX ORDER — TAMSULOSIN HYDROCHLORIDE 0.4 MG/1
0.4 CAPSULE ORAL
Qty: 90 | Refills: 3 | Status: ACTIVE | COMMUNITY
Start: 2024-12-05 | End: 1900-01-01

## 2024-12-09 LAB — BACTERIA UR CULT: NORMAL

## 2025-02-06 ENCOUNTER — NON-APPOINTMENT (OUTPATIENT)
Age: 89
End: 2025-02-06

## 2025-02-06 ENCOUNTER — APPOINTMENT (OUTPATIENT)
Dept: CARDIOLOGY | Facility: CLINIC | Age: 89
End: 2025-02-06

## 2025-02-06 PROCEDURE — 93296 REM INTERROG EVL PM/IDS: CPT

## 2025-02-06 PROCEDURE — 93295 DEV INTERROG REMOTE 1/2/MLT: CPT

## 2025-04-29 NOTE — PHYSICAL THERAPY INITIAL EVALUATION ADULT - FUNCTIONAL LIMITATIONS, PT EVAL
Copied from CRM #[id number]. Topic: MW Schedule Appointment  >> Apr 29, 2025  7:50 AM Georges MENDOSA wrote:  Mario Alberto called to schedule, cancel or reschedule a All Other Scheduling.   Unable to schedule, reschedule, or cancel. Sent message.    -- DO NOT REPLY / DO NOT REPLY ALL --  -- This inbox is not monitored. If this was sent to the wrong provider or department, reroute message to  [facility] Reroute pool. --  -- Message is from [facility] ([facility]) --    General Patient Message: Patient is not going to be in for his appointment today as he is currently admitted to the hospital   Caller Information       Contact Date/Time Type Contact Phone/Fax    04/29/2025 07:50 AM CDT Phone (Incoming) Mario Alberto [phone]            Alternative phone number: na    Can a detailed message be left? na  Patient has been advised the message will be addressed within 2-3 business days.                
stair negotiation

## 2025-05-01 ENCOUNTER — APPOINTMENT (OUTPATIENT)
Dept: UROLOGY | Facility: CLINIC | Age: 89
End: 2025-05-01
Payer: COMMERCIAL

## 2025-05-01 VITALS
OXYGEN SATURATION: 100 % | SYSTOLIC BLOOD PRESSURE: 131 MMHG | WEIGHT: 145 LBS | HEIGHT: 70 IN | RESPIRATION RATE: 18 BRPM | DIASTOLIC BLOOD PRESSURE: 79 MMHG | HEART RATE: 68 BPM | BODY MASS INDEX: 20.76 KG/M2

## 2025-05-01 DIAGNOSIS — E29.1 TESTICULAR HYPOFUNCTION: ICD-10-CM

## 2025-05-01 DIAGNOSIS — N40.1 BENIGN PROSTATIC HYPERPLASIA WITH LOWER URINARY TRACT SYMPMS: ICD-10-CM

## 2025-05-01 DIAGNOSIS — R39.9 UNSPECIFIED SYMPTOMS AND SIGNS INVOLVING THE GENITOURINARY SYSTEM: ICD-10-CM

## 2025-05-01 DIAGNOSIS — N13.8 BENIGN PROSTATIC HYPERPLASIA WITH LOWER URINARY TRACT SYMPMS: ICD-10-CM

## 2025-05-01 PROCEDURE — 51736 URINE FLOW MEASUREMENT: CPT

## 2025-05-01 PROCEDURE — 99214 OFFICE O/P EST MOD 30 MIN: CPT | Mod: 25

## 2025-05-01 PROCEDURE — 51798 US URINE CAPACITY MEASURE: CPT

## 2025-05-01 RX ORDER — AMLODIPINE BESYLATE 2.5 MG/1
2.5 TABLET ORAL
Refills: 0 | Status: ACTIVE | COMMUNITY

## 2025-05-08 ENCOUNTER — APPOINTMENT (OUTPATIENT)
Dept: ELECTROPHYSIOLOGY | Facility: CLINIC | Age: 89
End: 2025-05-08
Payer: MEDICARE

## 2025-05-08 VITALS
BODY MASS INDEX: 21.76 KG/M2 | SYSTOLIC BLOOD PRESSURE: 116 MMHG | WEIGHT: 152 LBS | DIASTOLIC BLOOD PRESSURE: 60 MMHG | HEIGHT: 70 IN

## 2025-05-08 DIAGNOSIS — I50.22 CHRONIC SYSTOLIC (CONGESTIVE) HEART FAILURE: ICD-10-CM

## 2025-05-08 DIAGNOSIS — Z45.02 ENCOUNTER FOR ADJUSTMENT AND MANAGEMENT OF AUTOMATIC IMPLANTABLE CARDIAC DEFIBRILLATOR: ICD-10-CM

## 2025-05-08 DIAGNOSIS — I10 ESSENTIAL (PRIMARY) HYPERTENSION: ICD-10-CM

## 2025-05-08 DIAGNOSIS — I44.2 ATRIOVENTRICULAR BLOCK, COMPLETE: ICD-10-CM

## 2025-05-08 PROCEDURE — 93000 ELECTROCARDIOGRAM COMPLETE: CPT | Mod: 59

## 2025-05-08 PROCEDURE — 99213 OFFICE O/P EST LOW 20 MIN: CPT

## 2025-05-08 PROCEDURE — 93284 PRGRMG EVAL IMPLANTABLE DFB: CPT

## 2025-08-07 ENCOUNTER — APPOINTMENT (OUTPATIENT)
Dept: CARDIOLOGY | Facility: CLINIC | Age: 89
End: 2025-08-07

## 2025-08-07 ENCOUNTER — NON-APPOINTMENT (OUTPATIENT)
Age: 89
End: 2025-08-07

## 2025-08-07 PROCEDURE — 93296 REM INTERROG EVL PM/IDS: CPT

## 2025-08-07 PROCEDURE — 93295 DEV INTERROG REMOTE 1/2/MLT: CPT
